# Patient Record
Sex: FEMALE | Race: WHITE | NOT HISPANIC OR LATINO | Employment: OTHER | URBAN - NONMETROPOLITAN AREA
[De-identification: names, ages, dates, MRNs, and addresses within clinical notes are randomized per-mention and may not be internally consistent; named-entity substitution may affect disease eponyms.]

---

## 2017-02-28 ENCOUNTER — OFFICE VISIT (OUTPATIENT)
Dept: SURGERY | Facility: CLINIC | Age: 52
End: 2017-02-28

## 2017-02-28 VITALS
BODY MASS INDEX: 34.17 KG/M2 | HEART RATE: 85 BPM | SYSTOLIC BLOOD PRESSURE: 130 MMHG | DIASTOLIC BLOOD PRESSURE: 78 MMHG | TEMPERATURE: 97.6 F | OXYGEN SATURATION: 96 % | HEIGHT: 61 IN | WEIGHT: 181 LBS

## 2017-02-28 DIAGNOSIS — Z12.11 COLON CANCER SCREENING: Primary | ICD-10-CM

## 2017-02-28 PROCEDURE — S0260 H&P FOR SURGERY: HCPCS | Performed by: SURGERY

## 2017-02-28 RX ORDER — LORATADINE 10 MG/1
10 TABLET ORAL DAILY
COMMUNITY
End: 2022-10-26

## 2017-02-28 RX ORDER — SODIUM CHLORIDE 0.9 % (FLUSH) 0.9 %
1-10 SYRINGE (ML) INJECTION AS NEEDED
Status: CANCELLED | OUTPATIENT
Start: 2017-02-28

## 2017-02-28 RX ORDER — LEVOTHYROXINE SODIUM 0.03 MG/1
25 TABLET ORAL EVERY MORNING
COMMUNITY

## 2017-02-28 RX ORDER — OXYBUTYNIN CHLORIDE 10 MG/1
10 TABLET, EXTENDED RELEASE ORAL DAILY
COMMUNITY

## 2017-02-28 RX ORDER — PHENTERMINE HYDROCHLORIDE 37.5 MG/1
37.5 CAPSULE ORAL EVERY MORNING
COMMUNITY
End: 2017-03-28

## 2017-02-28 RX ORDER — POLYETHYLENE GLYCOL 3350 17 G/17G
238 POWDER, FOR SOLUTION ORAL ONCE
Qty: 238 G | Refills: 0 | Status: SHIPPED | OUTPATIENT
Start: 2017-02-28 | End: 2017-02-28

## 2017-02-28 RX ORDER — BIOTIN 1 MG
1000 TABLET ORAL DAILY
COMMUNITY

## 2017-02-28 RX ORDER — AZELASTINE 1 MG/ML
2 SPRAY, METERED NASAL 2 TIMES DAILY PRN
COMMUNITY
End: 2020-04-27

## 2017-02-28 RX ORDER — ATORVASTATIN CALCIUM 20 MG/1
10 TABLET, FILM COATED ORAL DAILY
COMMUNITY
End: 2020-04-30

## 2017-02-28 RX ORDER — CETIRIZINE HYDROCHLORIDE 10 MG/1
10 TABLET ORAL DAILY
COMMUNITY
End: 2022-10-26

## 2017-02-28 RX ORDER — SODIUM CHLORIDE, SODIUM LACTATE, POTASSIUM CHLORIDE, CALCIUM CHLORIDE 600; 310; 30; 20 MG/100ML; MG/100ML; MG/100ML; MG/100ML
50 INJECTION, SOLUTION INTRAVENOUS CONTINUOUS
Status: CANCELLED | OUTPATIENT
Start: 2017-02-28

## 2017-02-28 NOTE — PROGRESS NOTES
Subjective   La Gambino is a 51 y.o. female.   Chief Complaint   Patient presents with   • Colon Cancer Screening     No prior exam. Patient denies rectal bleeding and pain. Patient has hx of constipation. No family hx of colon cancer.        History of Present Illness     Mrs. Gambino presents for evaluation for colon cancer screening.  There is no family history of colon neoplasia. No family hx of gastric, ovarian, or uterine cancer. Patient denies changes in bowel habits, abdominal pain, decreased caliber of stool, melena, brbpr and weight loss. Patient reports diarrhea and constipation. There is no personal or family history of bleeding disorder or untoward reaction to anesthesia. Patient has never had a colonoscopy.      Past Medical History   Diagnosis Date   • Allergic rhinitis    • Hyperlipidemia    • Hypothyroidism    • OAB (overactive bladder)        Past Surgical History   Procedure Laterality Date   • Hysterectomy     •  section     • Dilatation and curettage     • Mouth surgery     • Mastopexy Bilateral    • Cervical conization, leep     • Endometrial ablation     • Nasal septum surgery     • Ganglion cyst excision     • Soft tissue cyst excision         Current Outpatient Prescriptions:   •  atorvastatin (LIPITOR) 20 MG tablet, Take 20 mg by mouth Daily., Disp: , Rfl:   •  azelastine (ASTELIN) 0.1 % nasal spray, 2 sprays into each nostril 2 (Two) Times a Day. Use in each nostril as directed, Disp: , Rfl:   •  Biotin 5000 MCG tablet, Take  by mouth., Disp: , Rfl:   •  cetirizine (zyrTEC) 10 MG tablet, Take 10 mg by mouth Daily., Disp: , Rfl:   •  levothyroxine (SYNTHROID, LEVOTHROID) 25 MCG tablet, Take 25 mcg by mouth Daily., Disp: , Rfl:   •  loratadine (CLARITIN) 10 MG tablet, Take 10 mg by mouth Daily., Disp: , Rfl:   •  oxybutynin XL (DITROPAN XL) 10 MG 24 hr tablet, Take 10 mg by mouth Daily., Disp: , Rfl:   •  phentermine 37.5 MG capsule, Take 37.5 mg by mouth Every Morning.,  "Disp: , Rfl:   •  bisacodyl (DULCOLAX) 5 MG EC tablet, Take 4 tablets once by mouth at 1 pm on day before colonoscopy., Disp: 4 tablet, Rfl: 0  •  polyethylene glycol (MIRALAX) powder, Take 238 g by mouth 1 (One) Time for 1 dose. Mix 238 grams of miralax with 64 oz garotade and drink at 4 pm day before colonoscopy, Disp: 238 g, Rfl: 0      Allergies no known allergies      Family History   Problem Relation Age of Onset   • Arthritis Mother    • Diabetes Mother    • Kidney cancer Mother    • Lung cancer Father    • Heart attack Father    • Stroke Father    • Osteosarcoma Brother    • Lung cancer Paternal Grandmother    • Heart attack Paternal Grandfather          Social History     Social History   • Marital status:      Spouse name: N/A   • Number of children: N/A   • Years of education: N/A     Occupational History   • Not on file.     Social History Main Topics   • Smoking status: Not on file   • Smokeless tobacco: Not on file   • Alcohol use Not on file   • Drug use: Not on file   • Sexual activity: Not on file     Other Topics Concern   • Not on file     Social History Narrative   • No narrative on file         Review of Systems   Constitutional: Positive for fatigue.   HENT: Negative.    Eyes: Negative.    Respiratory: Negative.    Cardiovascular: Negative.    Gastrointestinal: Negative.    Endocrine: Negative.    Genitourinary: Positive for frequency.   Musculoskeletal: Negative.    Skin: Negative.    Allergic/Immunologic: Negative.    Neurological: Negative.    Hematological: Negative.    Psychiatric/Behavioral: Negative.        Objective    Visit Vitals   • /78   • Pulse 85   • Temp 97.6 °F (36.4 °C)   • Ht 61\" (154.9 cm)   • Wt 181 lb (82.1 kg)   • SpO2 96%   • BMI 34.2 kg/m2       Physical Exam   Constitutional: She is oriented to person, place, and time. She appears well-developed and well-nourished.   HENT:   Head: Normocephalic and atraumatic.   Eyes: No scleral icterus.   Cardiovascular: " Regular rhythm.    Pulmonary/Chest: Effort normal.   Neurological: She is alert and oriented to person, place, and time.   Skin: Skin is warm and dry.   Psychiatric: She has a normal mood and affect. Her behavior is normal.       Assessment/Plan   La was seen today for colon cancer screening.    Diagnoses and all orders for this visit:    Colon cancer screening  -     Case Request; Standing  -     sodium chloride 0.9 % flush 1-10 mL; Infuse 1-10 mL into a venous catheter As Needed for line care.  -     lactated ringers infusion; Infuse 50 mL/hr into a venous catheter Continuous.  -     Case Request    Other orders  -     Follow Anesthesia Guidelines / Standing Orders; Future  -     Provide NPO Instructions to Patient  -     Follow Anesthesia Guidelines / Standing Orders; Standing  -     Verify NPO Status; Standing  -     Verify Informed Consent; Standing  -     Notify Physician (Specify Parameters); Standing  -     Insert Peripheral IV; Standing  -     Saline Lock & Maintain IV Access; Standing  -     polyethylene glycol (MIRALAX) powder; Take 238 g by mouth 1 (One) Time for 1 dose. Mix 238 grams of miralax with 64 oz garotade and drink at 4 pm day before colonoscopy  -     bisacodyl (DULCOLAX) 5 MG EC tablet; Take 4 tablets once by mouth at 1 pm on day before colonoscopy.    We discussed colonoscopy for colon cancer screening purposes.  We discussed the indications for screening colonoscopy as well as the risks, benefits and alternatives to this procedure. Risks including but not limited to perforation, bleeding,need for blood transfusion or emergent surgery ,and missed neoplasm were reviewed in detail with the patient. The necessary bowel preparation and pre-procedure clear liquid diet was explained in detail.  A written instructional handout was also provided.  Electronic prescriptions for miralax and dulcolax were sent to the patient's pharmacy.  The patient was given an opportunity to ask questions.  The  patient verbalized understanding of these recommendations and the plan of care. The patient is willing to proceed with colonoscopy and has signed informed consent in the office today.  My office will arrange scheduling for the colonoscopy procedure and pre-admission testing.        The patient's ANTONIO report was obtained, reviewed by me and scanned into the medical record.

## 2017-03-28 RX ORDER — VITAMIN E 268 MG
400 CAPSULE ORAL DAILY
COMMUNITY
End: 2022-10-26

## 2017-03-28 RX ORDER — CHLORAL HYDRATE 500 MG
1000 CAPSULE ORAL
COMMUNITY
End: 2020-09-28

## 2017-03-28 RX ORDER — CHOLECALCIFEROL (VITAMIN D3) 125 MCG
5000 CAPSULE ORAL DAILY
COMMUNITY
End: 2021-09-29

## 2017-03-28 RX ORDER — DOCUSATE SODIUM 100 MG/1
100 CAPSULE, LIQUID FILLED ORAL DAILY
COMMUNITY
End: 2020-04-30

## 2017-04-03 ENCOUNTER — ANESTHESIA EVENT (OUTPATIENT)
Dept: GASTROENTEROLOGY | Facility: HOSPITAL | Age: 52
End: 2017-04-03

## 2017-04-03 ENCOUNTER — HOSPITAL ENCOUNTER (OUTPATIENT)
Facility: HOSPITAL | Age: 52
Setting detail: HOSPITAL OUTPATIENT SURGERY
Discharge: HOME | End: 2017-04-03
Attending: SURGERY | Admitting: SURGERY

## 2017-04-03 ENCOUNTER — ANESTHESIA (OUTPATIENT)
Dept: GASTROENTEROLOGY | Facility: HOSPITAL | Age: 52
End: 2017-04-03

## 2017-04-03 VITALS
DIASTOLIC BLOOD PRESSURE: 60 MMHG | OXYGEN SATURATION: 95 % | HEART RATE: 63 BPM | WEIGHT: 170 LBS | HEIGHT: 61 IN | TEMPERATURE: 98.2 F | RESPIRATION RATE: 16 BRPM | SYSTOLIC BLOOD PRESSURE: 92 MMHG | BODY MASS INDEX: 32.1 KG/M2

## 2017-04-03 DIAGNOSIS — Z12.11 COLON CANCER SCREENING: ICD-10-CM

## 2017-04-03 PROCEDURE — 45378 DIAGNOSTIC COLONOSCOPY: CPT | Performed by: SURGERY

## 2017-04-03 PROCEDURE — 25010000002 PROPOFOL 10 MG/ML EMULSION: Performed by: NURSE ANESTHETIST, CERTIFIED REGISTERED

## 2017-04-03 PROCEDURE — 25010000002 MIDAZOLAM PER 1 MG: Performed by: NURSE ANESTHETIST, CERTIFIED REGISTERED

## 2017-04-03 PROCEDURE — S0260 H&P FOR SURGERY: HCPCS | Performed by: SURGERY

## 2017-04-03 PROCEDURE — 25010000002 ONDANSETRON PER 1 MG: Performed by: NURSE ANESTHETIST, CERTIFIED REGISTERED

## 2017-04-03 PROCEDURE — 25010000002 MEPERIDINE PER 100 MG: Performed by: NURSE ANESTHETIST, CERTIFIED REGISTERED

## 2017-04-03 RX ORDER — SODIUM CHLORIDE, SODIUM LACTATE, POTASSIUM CHLORIDE, CALCIUM CHLORIDE 600; 310; 30; 20 MG/100ML; MG/100ML; MG/100ML; MG/100ML
50 INJECTION, SOLUTION INTRAVENOUS CONTINUOUS
Status: DISCONTINUED | OUTPATIENT
Start: 2017-04-03 | End: 2017-04-03 | Stop reason: HOSPADM

## 2017-04-03 RX ORDER — MEPERIDINE HYDROCHLORIDE 50 MG/ML
INJECTION INTRAMUSCULAR; INTRAVENOUS; SUBCUTANEOUS AS NEEDED
Status: DISCONTINUED | OUTPATIENT
Start: 2017-04-03 | End: 2017-04-03 | Stop reason: SURG

## 2017-04-03 RX ORDER — PROPOFOL 10 MG/ML
VIAL (ML) INTRAVENOUS AS NEEDED
Status: DISCONTINUED | OUTPATIENT
Start: 2017-04-03 | End: 2017-04-03 | Stop reason: SURG

## 2017-04-03 RX ORDER — ONDANSETRON 2 MG/ML
INJECTION INTRAMUSCULAR; INTRAVENOUS AS NEEDED
Status: DISCONTINUED | OUTPATIENT
Start: 2017-04-03 | End: 2017-04-03 | Stop reason: SURG

## 2017-04-03 RX ORDER — MIDAZOLAM HYDROCHLORIDE 1 MG/ML
INJECTION INTRAMUSCULAR; INTRAVENOUS AS NEEDED
Status: DISCONTINUED | OUTPATIENT
Start: 2017-04-03 | End: 2017-04-03 | Stop reason: SURG

## 2017-04-03 RX ORDER — SODIUM CHLORIDE 0.9 % (FLUSH) 0.9 %
1-10 SYRINGE (ML) INJECTION AS NEEDED
Status: DISCONTINUED | OUTPATIENT
Start: 2017-04-03 | End: 2017-04-03 | Stop reason: HOSPADM

## 2017-04-03 RX ADMIN — SODIUM CHLORIDE, POTASSIUM CHLORIDE, SODIUM LACTATE AND CALCIUM CHLORIDE 50 ML/HR: 600; 310; 30; 20 INJECTION, SOLUTION INTRAVENOUS at 06:53

## 2017-04-03 RX ADMIN — PROPOFOL 50 MG: 10 INJECTION, EMULSION INTRAVENOUS at 07:36

## 2017-04-03 RX ADMIN — MEPERIDINE HYDROCHLORIDE 50 MG: 50 INJECTION INTRAMUSCULAR; INTRAVENOUS; SUBCUTANEOUS at 07:36

## 2017-04-03 RX ADMIN — MIDAZOLAM HYDROCHLORIDE 2 MG: 1 INJECTION, SOLUTION INTRAMUSCULAR; INTRAVENOUS at 07:36

## 2017-04-03 RX ADMIN — PROPOFOL 50 MG: 10 INJECTION, EMULSION INTRAVENOUS at 07:45

## 2017-04-03 RX ADMIN — ONDANSETRON 4 MG: 2 INJECTION INTRAMUSCULAR; INTRAVENOUS at 07:36

## 2017-04-03 NOTE — PLAN OF CARE
Problem: GI Endoscopy (Adult)  Goal: Signs and Symptoms of Listed Potential Problems Will be Absent or Manageable (GI Endoscopy)  Outcome: Ongoing (interventions implemented as appropriate)    04/03/17 0633   GI Endoscopy   Problems Assessed (GI Endoscopy) all   Problems Present (GI Endoscopy       04/03/17 0633   GI Endoscopy   Problems Assessed (GI Endoscopy) all   Problems Present (GI Endoscopy) none   ) none

## 2017-04-03 NOTE — H&P
Patient Care Team:  KEVEN Hansen as PCP - General (Family Medicine)  Prema Phillip MD as Surgeon (General Surgery)    Chief complaint colon cancer screening    Subjective     History of Present Illness  Colon cancer screening.  Patient is asymptomatic.  No family hx.      Review of Systems   All other systems reviewed and are negative.       Past Medical History:   Diagnosis Date   • Allergic rhinitis    • Hyperlipidemia    • Hypothyroidism    • OAB (overactive bladder)    • PONV (postoperative nausea and vomiting)    • Seasonal allergies    • Wears glasses      Past Surgical History:   Procedure Laterality Date   • BLADDER REPAIR      WITH HYSTERECTOMY   • CERVICAL CONIZATION, LEEP     •  SECTION     • DILATATION AND CURETTAGE     • ENDOMETRIAL ABLATION     • GANGLION CYST EXCISION     • HYSTERECTOMY     • MASTOPEXY Bilateral    • MOUTH SURGERY      WISDOM TEETH EXTRACTED   • NASAL SEPTUM SURGERY     • SOFT TISSUE CYST EXCISION      SEBACOUS CYST REMOVED      Family History   Problem Relation Age of Onset   • Arthritis Mother    • Diabetes Mother    • Kidney cancer Mother    • Lung cancer Father    • Heart attack Father    • Stroke Father    • Osteosarcoma Brother    • Lung cancer Paternal Grandmother    • Heart attack Paternal Grandfather      Social History   Substance Use Topics   • Smoking status: Never Smoker   • Smokeless tobacco: Never Used   • Alcohol use No     Prescriptions Prior to Admission   Medication Sig Dispense Refill Last Dose   • atorvastatin (LIPITOR) 20 MG tablet Take 10 mg by mouth Daily.   2017 at 1930   • azelastine (ASTELIN) 0.1 % nasal spray 2 sprays into each nostril 2 (Two) Times a Day As Needed. Use in each nostril as directed    Past Month at Unknown time   • Biotin 5000 MCG tablet Take 5,000 mcg by mouth Daily.   2017   • bisacodyl (DULCOLAX) 5 MG EC tablet Take 4 tablets once by mouth at 1 pm on day before colonoscopy.  (Patient taking differently: Take 20 mg by mouth. Take 4 tablets once by mouth at 1 pm on day before colonoscopy.  ) 4 tablet 0 4/2/2017 at 1300   • cetirizine (zyrTEC) 10 MG tablet Take 10 mg by mouth Daily.   4/2/2017 at 1930   • Cholecalciferol (VITAMIN D3) 5000 UNITS capsule capsule Take 5,000 Units by mouth Daily.   4/1/2017   • docusate sodium (COLACE) 100 MG capsule Take 100 mg by mouth Daily.   4/1/2017   • levothyroxine (SYNTHROID, LEVOTHROID) 25 MCG tablet Take 25 mcg by mouth Daily.   4/1/2017   • loratadine (CLARITIN) 10 MG tablet Take 10 mg by mouth Daily.   4/2/2017 at 0900   • MULTIPLE VITAMIN PO Take 1 tablet by mouth Daily.   4/1/2017   • Omega-3 Fatty Acids (FISH OIL) 1000 MG capsule capsule Take 1,000 mg by mouth Daily With Breakfast.   4/1/2017   • oxybutynin XL (DITROPAN XL) 10 MG 24 hr tablet Take 10 mg by mouth Daily.   4/1/2017   • vitamin E 400 UNIT capsule Take 400 Units by mouth Daily.   4/1/2017     Allergies:  Review of patient's allergies indicates no known allergies.    Objective      Vital Signs  Temp:  [98 °F (36.7 °C)] 98 °F (36.7 °C)  Heart Rate:  [62] 62  Resp:  [18] 18  BP: (110)/(72) 110/72    Physical Exam   Constitutional: She is oriented to person, place, and time. She appears well-developed and well-nourished.   HENT:   Head: Normocephalic and atraumatic.   Cardiovascular: Regular rhythm.    Pulmonary/Chest: Effort normal.   Abdominal: Soft. She exhibits no distension. There is no tenderness.   Neurological: She is alert and oriented to person, place, and time.   Skin: Skin is warm and dry.   Psychiatric: She has a normal mood and affect. Her behavior is normal.       Results Review:   I reviewed the patient's new clinical results.          Active Problems:    Colon cancer screening      Assessment & Plan  We discussed the indications for screening colonoscopy as well as the risks, benefits and alternatives to this procedure. Risks including but not limited to perforation,  bleeding,need for blood transfusion or emergent surgery ,and missed neoplasm were reviewed in detail with the patient. Consent was signed.        Prema Phillip MD  04/03/17  7:34 AM

## 2017-04-03 NOTE — DISCHARGE INSTRUCTIONS
To assist you in voiding:  Drink plenty of fluids  Listen to running water while attempting to void.    If you are unable to urinate and you have an uncomfortable urge to void or it has been   6 hours since you were discharged, return to the Emergency Room    NEXT COLONOSCOPY IN 10 YEARS

## 2017-04-03 NOTE — ANESTHESIA PREPROCEDURE EVALUATION
Anesthesia Evaluation     Patient summary reviewed and Nursing notes reviewed   history of anesthetic complications: PONV  NPO Status: > 8 hours   Airway   Mallampati: I  TM distance: >3 FB  Neck ROM: full  no difficulty expected  Dental - normal exam     Pulmonary - negative pulmonary ROS and normal exam    breath sounds clear to auscultation  Cardiovascular - normal exam    Rhythm: regular  Rate: normal    (+) hyperlipidemia      Neuro/Psych- negative ROS  GI/Hepatic/Renal/Endo    (+)  hypothyroidism,     Musculoskeletal (-) negative ROS    Abdominal    Substance History - negative use     OB/GYN negative ob/gyn ROS         Other - negative ROS                                   Anesthesia Plan    ASA 2     MAC   (Pt told that intravenous sedation will be used as the primary anesthetic along with local anesthesia if necessary. Every effort will be made to make sure the patient is comfortable.     The patient was told they may or may not have recall for the procedure.    Will proceed with the plan of care.)  intravenous induction   Anesthetic plan and risks discussed with patient.

## 2017-04-03 NOTE — BRIEF OP NOTE
COLONOSCOPY  Procedure Note    La Gambino  4/3/2017    Pre-op Diagnosis:   Colon cancer screening [Z12.11]    Post-op Diagnosis:     Diverticulosis  Internal Hemorrhoids     Procedure/CPT® Codes: 86135      Procedure(s):  COLONOSCOPY     Surgeon(s):  Prema Phillip MD    Anesthesia: Monitor Anesthesia Care    Staff:   Circulator: Teresa Bellamy RN  Scrub Person: Austyn Blanton; Trung Flannery    Estimated Blood Loss: * No values recorded between 4/3/2017  7:34 AM and 4/3/2017  8:01 AM *  Urine Voided: * No values recorded between 4/3/2017  7:34 AM and 4/3/2017  8:01 AM *    Specimens:                * No specimens in log *      Drains: none    Findings: as above    Complications: none      Prema Phillip MD     Date: 4/3/2017  Time: 8:03 AM

## 2017-05-11 ENCOUNTER — HOSPITAL ENCOUNTER (OUTPATIENT)
Dept: OTHER | Age: 52
Discharge: HOME | End: 2017-05-11

## 2017-05-11 LAB — ESTRADIOL LEVEL: 91 PG/ML

## 2017-05-14 LAB — TESTOSTERONE FREE: 3.2 PG/ML (ref 1.1–5.8)

## 2017-05-25 ENCOUNTER — HOSPITAL ENCOUNTER (OUTPATIENT)
Dept: OTHER | Age: 52
Discharge: HOME | End: 2017-05-25
Attending: NURSE PRACTITIONER | Admitting: NURSE PRACTITIONER

## 2017-05-25 LAB
A/G RATIO: 2.1 (ref 0.8–2)
ALBUMIN SERPL-MCNC: 4.2 G/DL (ref 3.4–4.8)
ALP BLD-CCNC: 75 U/L (ref 25–100)
ALT SERPL-CCNC: 19 U/L (ref 4–36)
ANION GAP SERPL CALCULATED.3IONS-SCNC: 11 MMOL/L (ref 3–16)
AST SERPL-CCNC: 22 U/L (ref 8–33)
BASOPHILS ABSOLUTE: 0 K/UL (ref 0–0.1)
BASOPHILS RELATIVE PERCENT: 0.4 %
BILIRUB SERPL-MCNC: 0.3 MG/DL (ref 0.3–1.2)
BUN BLDV-MCNC: 11 MG/DL (ref 6–20)
CALCIUM SERPL-MCNC: 8.8 MG/DL (ref 8.5–10.5)
CHLORIDE BLD-SCNC: 106 MMOL/L (ref 98–107)
CHOLESTEROL, TOTAL: 167 MG/DL (ref 0–200)
CO2: 25 MMOL/L (ref 20–30)
CREAT SERPL-MCNC: 0.6 MG/DL (ref 0.4–1.2)
EOSINOPHILS ABSOLUTE: 0.2 K/UL (ref 0–0.4)
EOSINOPHILS RELATIVE PERCENT: 2.4 %
FOLATE: 7.28 NG/ML
GFR AFRICAN AMERICAN: >59
GFR NON-AFRICAN AMERICAN: >60
GLOBULIN: 2 G/DL
GLUCOSE BLD-MCNC: 85 MG/DL (ref 74–106)
HCT VFR BLD CALC: 40 % (ref 37–47)
HDLC SERPL-MCNC: 59 MG/DL (ref 40–60)
HEMOGLOBIN: 13.6 G/DL (ref 11.5–16.5)
LDL CHOLESTEROL CALCULATED: 74 MG/DL
LYMPHOCYTES ABSOLUTE: 1.6 K/UL (ref 1.5–4)
LYMPHOCYTES RELATIVE PERCENT: 22.6 % (ref 20–40)
MCH RBC QN AUTO: 31.6 PG (ref 27–32)
MCHC RBC AUTO-ENTMCNC: 34 G/DL (ref 31–35)
MCV RBC AUTO: 92.8 FL (ref 80–100)
MONOCYTES ABSOLUTE: 0.4 K/UL (ref 0.2–0.8)
MONOCYTES RELATIVE PERCENT: 5.8 % (ref 3–10)
NEUTROPHILS ABSOLUTE: 5 K/UL (ref 2–7.5)
NEUTROPHILS RELATIVE PERCENT: 68.8 %
PDW BLD-RTO: 12.5 % (ref 11–16)
PLATELET # BLD: 244 K/UL (ref 150–400)
PMV BLD AUTO: 10.9 FL (ref 6–10)
POTASSIUM SERPL-SCNC: 4.4 MMOL/L (ref 3.4–5.1)
RBC # BLD: 4.31 M/UL (ref 3.8–5.8)
SODIUM BLD-SCNC: 142 MMOL/L (ref 136–145)
TOTAL PROTEIN: 6.2 G/DL (ref 6.4–8.3)
TRIGL SERPL-MCNC: 170 MG/DL (ref 0–249)
TSH SERPL DL<=0.05 MIU/L-ACNC: 1.39 UIU/ML (ref 0.35–5.5)
VITAMIN B-12: 688 PG/ML (ref 211–911)
VLDLC SERPL CALC-MCNC: 34 MG/DL
WBC # BLD: 7.2 K/UL (ref 4–11)

## 2017-05-31 ENCOUNTER — HOSPITAL ENCOUNTER (OUTPATIENT)
Dept: PHYSICAL THERAPY | Age: 52
Discharge: HOME | End: 2017-05-31
Attending: NURSE PRACTITIONER | Admitting: NURSE PRACTITIONER

## 2017-05-31 ASSESSMENT — PAIN SCALES - GENERAL: PAINLEVEL_OUTOF10: 4

## 2017-06-06 ENCOUNTER — HOSPITAL ENCOUNTER (OUTPATIENT)
Dept: PHYSICAL THERAPY | Age: 52
Discharge: HOME | End: 2017-06-06
Admitting: NURSE PRACTITIONER

## 2017-06-09 ENCOUNTER — HOSPITAL ENCOUNTER (OUTPATIENT)
Dept: PHYSICAL THERAPY | Age: 52
Discharge: HOME | End: 2017-06-09
Admitting: NURSE PRACTITIONER

## 2017-06-15 ENCOUNTER — HOSPITAL ENCOUNTER (OUTPATIENT)
Dept: PHYSICAL THERAPY | Age: 52
Discharge: HOME | End: 2017-06-15
Admitting: NURSE PRACTITIONER

## 2017-06-19 ENCOUNTER — HOSPITAL ENCOUNTER (OUTPATIENT)
Dept: PHYSICAL THERAPY | Age: 52
Discharge: HOME | End: 2017-06-19
Admitting: NURSE PRACTITIONER

## 2017-06-21 ENCOUNTER — HOSPITAL ENCOUNTER (OUTPATIENT)
Dept: PHYSICAL THERAPY | Age: 52
Discharge: HOME | End: 2017-06-21
Admitting: NURSE PRACTITIONER

## 2017-06-27 ENCOUNTER — HOSPITAL ENCOUNTER (OUTPATIENT)
Dept: PHYSICAL THERAPY | Age: 52
Discharge: HOME | End: 2017-06-27
Admitting: NURSE PRACTITIONER

## 2017-06-30 ENCOUNTER — HOSPITAL ENCOUNTER (OUTPATIENT)
Dept: PHYSICAL THERAPY | Age: 52
Discharge: HOME | End: 2017-06-30
Admitting: NURSE PRACTITIONER

## 2017-07-03 ENCOUNTER — HOSPITAL ENCOUNTER (OUTPATIENT)
Dept: PHYSICAL THERAPY | Age: 52
Discharge: HOME | End: 2017-07-03
Admitting: NURSE PRACTITIONER

## 2017-07-10 ENCOUNTER — HOSPITAL ENCOUNTER (OUTPATIENT)
Dept: PHYSICAL THERAPY | Age: 52
Discharge: HOME | End: 2017-07-10
Admitting: NURSE PRACTITIONER

## 2017-07-18 ENCOUNTER — HOSPITAL ENCOUNTER (OUTPATIENT)
Dept: PHYSICAL THERAPY | Age: 52
Discharge: HOME | End: 2017-07-18
Admitting: NURSE PRACTITIONER

## 2017-08-01 ENCOUNTER — HOSPITAL ENCOUNTER (OUTPATIENT)
Dept: OTHER | Age: 52
Discharge: HOME | End: 2017-08-01
Attending: OBSTETRICS & GYNECOLOGY | Admitting: OBSTETRICS & GYNECOLOGY

## 2017-08-01 LAB — ESTRADIOL LEVEL: 89 PG/ML

## 2017-08-03 LAB
SEX HORMONE BINDING GLOBULIN: 87 NMOL/L (ref 30–135)
TESTOSTERONE FREE-NONMALE: 2 PG/ML (ref 1.1–5.8)
TESTOSTERONE TOTAL: 22 NG/DL (ref 20–70)

## 2017-08-14 ENCOUNTER — HOSPITAL ENCOUNTER (OUTPATIENT)
Dept: PHYSICAL THERAPY | Age: 52
Discharge: HOME | End: 2017-08-14
Admitting: NURSE PRACTITIONER

## 2017-08-17 ENCOUNTER — HOSPITAL ENCOUNTER (OUTPATIENT)
Dept: OTHER | Age: 52
Discharge: HOME | End: 2017-08-17
Attending: NURSE PRACTITIONER | Admitting: NURSE PRACTITIONER

## 2017-08-17 DIAGNOSIS — M25.562 LEFT KNEE PAIN, UNSPECIFIED CHRONICITY: ICD-10-CM

## 2017-11-20 ENCOUNTER — HOSPITAL ENCOUNTER (OUTPATIENT)
Dept: OTHER | Age: 52
Discharge: HOME | End: 2017-11-20

## 2017-11-20 LAB
A/G RATIO: 1.6 (ref 0.8–2)
ALBUMIN SERPL-MCNC: 4.5 G/DL (ref 3.4–4.8)
ALP BLD-CCNC: 89 U/L (ref 25–100)
ALT SERPL-CCNC: 27 U/L (ref 4–36)
ANION GAP SERPL CALCULATED.3IONS-SCNC: 9 MMOL/L (ref 3–16)
AST SERPL-CCNC: 24 U/L (ref 8–33)
BASOPHILS ABSOLUTE: 0 K/UL (ref 0–0.1)
BASOPHILS RELATIVE PERCENT: 0.5 %
BILIRUB SERPL-MCNC: <0.2 MG/DL (ref 0.3–1.2)
BUN BLDV-MCNC: 12 MG/DL (ref 6–20)
CALCIUM SERPL-MCNC: 9.4 MG/DL (ref 8.5–10.5)
CHLORIDE BLD-SCNC: 102 MMOL/L (ref 98–107)
CHOLESTEROL, TOTAL: 187 MG/DL (ref 0–200)
CO2: 29 MMOL/L (ref 20–30)
CREAT SERPL-MCNC: 0.7 MG/DL (ref 0.4–1.2)
EOSINOPHILS ABSOLUTE: 0.2 K/UL (ref 0–0.4)
EOSINOPHILS RELATIVE PERCENT: 3.1 %
ESTRADIOL LEVEL: 98 PG/ML
FOLATE: 4.33 NG/ML
GFR AFRICAN AMERICAN: >59
GFR NON-AFRICAN AMERICAN: >60
GLOBULIN: 2.8 G/DL
GLUCOSE BLD-MCNC: 82 MG/DL (ref 74–106)
HCT VFR BLD CALC: 43.4 % (ref 37–47)
HDLC SERPL-MCNC: 70 MG/DL (ref 40–60)
HEMOGLOBIN: 14.8 G/DL (ref 11.5–16.5)
LDL CHOLESTEROL CALCULATED: 90 MG/DL
LYMPHOCYTES ABSOLUTE: 1.6 K/UL (ref 1.5–4)
LYMPHOCYTES RELATIVE PERCENT: 26.4 % (ref 20–40)
MCH RBC QN AUTO: 30.5 PG (ref 27–32)
MCHC RBC AUTO-ENTMCNC: 34.1 G/DL (ref 31–35)
MCV RBC AUTO: 89.5 FL (ref 80–100)
MONOCYTES ABSOLUTE: 0.5 K/UL (ref 0.2–0.8)
MONOCYTES RELATIVE PERCENT: 7.7 % (ref 3–10)
NEUTROPHILS ABSOLUTE: 3.8 K/UL (ref 2–7.5)
NEUTROPHILS RELATIVE PERCENT: 62.3 %
PDW BLD-RTO: 15.1 % (ref 11–16)
PLATELET # BLD: 276 K/UL (ref 150–400)
PMV BLD AUTO: 10.4 FL (ref 6–10)
POTASSIUM SERPL-SCNC: 4.3 MMOL/L (ref 3.4–5.1)
RBC # BLD: 4.85 M/UL (ref 3.8–5.8)
SODIUM BLD-SCNC: 140 MMOL/L (ref 136–145)
T3 FREE: 2.9 PG/ML (ref 2.3–4.2)
T4 FREE: 1.19 NG/DL (ref 0.89–1.76)
TOTAL PROTEIN: 7.3 G/DL (ref 6.4–8.3)
TRIGL SERPL-MCNC: 137 MG/DL (ref 0–249)
TSH SERPL DL<=0.05 MIU/L-ACNC: 2.53 UIU/ML (ref 0.35–5.5)
VITAMIN B-12: 892 PG/ML (ref 211–911)
VITAMIN D 25-HYDROXY: 51.1 (ref 32–100)
VLDLC SERPL CALC-MCNC: 27 MG/DL
WBC # BLD: 6.1 K/UL (ref 4–11)

## 2017-11-22 LAB
INSULIN COMMENT: NORMAL
INSULIN REFERENCE RANGE:: NORMAL
INSULIN: 9.6 MU/L
SEX HORMONE BINDING GLOBULIN: 83 NMOL/L (ref 30–135)
TESTOSTERONE FREE-NONMALE: ABNORMAL PG/ML (ref 1.1–5.8)
TESTOSTERONE TOTAL: <3 NG/DL (ref 20–70)

## 2018-02-05 ENCOUNTER — HOSPITAL ENCOUNTER (OUTPATIENT)
Dept: GENERAL RADIOLOGY | Age: 53
Discharge: HOME | End: 2018-02-05

## 2018-02-05 DIAGNOSIS — Z12.39 BREAST CANCER SCREENING: ICD-10-CM

## 2018-02-05 LAB — ESTRADIOL LEVEL: 108 PG/ML

## 2018-02-07 LAB — TESTOSTERONE TOTAL: 12 NG/DL (ref 20–70)

## 2018-03-23 ENCOUNTER — HOSPITAL ENCOUNTER (OUTPATIENT)
Dept: OTHER | Age: 53
Discharge: HOME | End: 2018-03-23

## 2018-03-23 DIAGNOSIS — M79.671 RIGHT FOOT PAIN: ICD-10-CM

## 2018-05-08 ENCOUNTER — HOSPITAL ENCOUNTER (OUTPATIENT)
Dept: OTHER | Age: 53
Discharge: HOME | End: 2018-05-08

## 2018-05-08 DIAGNOSIS — M25.511 RIGHT SHOULDER PAIN, UNSPECIFIED CHRONICITY: ICD-10-CM

## 2018-05-09 ENCOUNTER — HOSPITAL ENCOUNTER (OUTPATIENT)
Dept: OTHER | Age: 53
Discharge: HOME | End: 2018-05-09

## 2018-05-09 LAB
A/G RATIO: 2 (ref 0.8–2)
ALBUMIN SERPL-MCNC: 4.4 G/DL (ref 3.4–4.8)
ALP BLD-CCNC: 74 U/L (ref 25–100)
ALT SERPL-CCNC: 23 U/L (ref 4–36)
ANION GAP SERPL CALCULATED.3IONS-SCNC: 10 MMOL/L (ref 3–16)
AST SERPL-CCNC: 21 U/L (ref 8–33)
BASOPHILS ABSOLUTE: 0 K/UL (ref 0–0.1)
BASOPHILS RELATIVE PERCENT: 0.4 %
BILIRUB SERPL-MCNC: 0.4 MG/DL (ref 0.3–1.2)
BUN BLDV-MCNC: 16 MG/DL (ref 6–20)
CALCIUM SERPL-MCNC: 9.4 MG/DL (ref 8.5–10.5)
CHLORIDE BLD-SCNC: 102 MMOL/L (ref 98–107)
CHOLESTEROL, TOTAL: 186 MG/DL (ref 0–200)
CO2: 28 MMOL/L (ref 20–30)
CREAT SERPL-MCNC: 0.7 MG/DL (ref 0.4–1.2)
EOSINOPHILS ABSOLUTE: 0.1 K/UL (ref 0–0.4)
EOSINOPHILS RELATIVE PERCENT: 1 %
FOLATE: >20 NG/ML
GFR AFRICAN AMERICAN: >59
GFR NON-AFRICAN AMERICAN: >60
GLOBULIN: 2.2 G/DL
GLUCOSE BLD-MCNC: 82 MG/DL (ref 74–106)
HCT VFR BLD CALC: 42.9 % (ref 37–47)
HDLC SERPL-MCNC: 68 MG/DL (ref 40–60)
HEMOGLOBIN: 14.4 G/DL (ref 11.5–16.5)
IMMATURE GRANULOCYTES #: 0 K/UL
IMMATURE GRANULOCYTES %: 0.4 % (ref 0–5)
LDL CHOLESTEROL CALCULATED: 96 MG/DL
LYMPHOCYTES ABSOLUTE: 2.3 K/UL (ref 1.5–4)
LYMPHOCYTES RELATIVE PERCENT: 22.4 %
MCH RBC QN AUTO: 32 PG (ref 27–32)
MCHC RBC AUTO-ENTMCNC: 33.6 G/DL (ref 31–35)
MCV RBC AUTO: 95.3 FL (ref 80–100)
MONOCYTES ABSOLUTE: 0.8 K/UL (ref 0.2–0.8)
MONOCYTES RELATIVE PERCENT: 7.6 %
NEUTROPHILS ABSOLUTE: 6.9 K/UL (ref 2–7.5)
NEUTROPHILS RELATIVE PERCENT: 68.2 %
PDW BLD-RTO: 12.4 % (ref 11–16)
PLATELET # BLD: 276 K/UL (ref 150–400)
PMV BLD AUTO: 10.5 FL (ref 6–10)
POTASSIUM SERPL-SCNC: 4.4 MMOL/L (ref 3.4–5.1)
RBC # BLD: 4.5 M/UL (ref 3.8–5.8)
SODIUM BLD-SCNC: 140 MMOL/L (ref 136–145)
TOTAL PROTEIN: 6.6 G/DL (ref 6.4–8.3)
TRIGL SERPL-MCNC: 111 MG/DL (ref 0–249)
TSH SERPL DL<=0.05 MIU/L-ACNC: 2.43 UIU/ML (ref 0.35–5.5)
VITAMIN B-12: 621 PG/ML (ref 211–911)
VLDLC SERPL CALC-MCNC: 22 MG/DL
WBC # BLD: 10.1 K/UL (ref 4–11)

## 2018-05-30 ENCOUNTER — HOSPITAL ENCOUNTER (OUTPATIENT)
Dept: OTHER | Age: 53
Discharge: HOME | End: 2018-05-30
Attending: OBSTETRICS & GYNECOLOGY | Admitting: OBSTETRICS & GYNECOLOGY

## 2018-05-30 LAB — ESTRADIOL LEVEL: 184 PG/ML

## 2018-06-01 LAB
SEX HORMONE BINDING GLOBULIN: 69 NMOL/L (ref 30–135)
TESTOSTERONE FREE-NONMALE: 2.8 PG/ML (ref 0.6–3.8)
TESTOSTERONE TOTAL: 26 NG/DL (ref 20–70)

## 2018-08-15 ENCOUNTER — HOSPITAL ENCOUNTER (OUTPATIENT)
Dept: GENERAL RADIOLOGY | Facility: HOSPITAL | Age: 53
Discharge: HOME | End: 2018-08-15
Payer: OTHER GOVERNMENT

## 2018-08-15 ENCOUNTER — HOSPITAL ENCOUNTER (OUTPATIENT)
Facility: HOSPITAL | Age: 53
Discharge: HOME | End: 2018-08-15
Payer: OTHER GOVERNMENT

## 2018-08-15 DIAGNOSIS — Q62.11: ICD-10-CM

## 2018-08-15 LAB
ANION GAP SERPL CALCULATED.3IONS-SCNC: 9 MMOL/L (ref 3–16)
BUN BLDV-MCNC: 12 MG/DL (ref 6–20)
CALCIUM SERPL-MCNC: 9.3 MG/DL (ref 8.5–10.5)
CHLORIDE BLD-SCNC: 105 MMOL/L (ref 98–107)
CO2: 26 MMOL/L (ref 20–30)
CREAT SERPL-MCNC: 0.7 MG/DL (ref 0.4–1.2)
GFR AFRICAN AMERICAN: >59
GFR NON-AFRICAN AMERICAN: >60
GLUCOSE BLD-MCNC: 88 MG/DL (ref 74–106)
HCT VFR BLD CALC: 39.2 % (ref 37–47)
HEMOGLOBIN: 12.9 G/DL (ref 11.5–16.5)
MCH RBC QN AUTO: 32 PG (ref 27–32)
MCHC RBC AUTO-ENTMCNC: 32.9 G/DL (ref 31–35)
MCV RBC AUTO: 97.3 FL (ref 80–100)
PDW BLD-RTO: 12.4 % (ref 11–16)
PLATELET # BLD: 251 K/UL (ref 150–400)
PMV BLD AUTO: 10.7 FL (ref 6–10)
POTASSIUM SERPL-SCNC: 4.3 MMOL/L (ref 3.4–5.1)
RBC # BLD: 4.03 M/UL (ref 3.8–5.8)
SODIUM BLD-SCNC: 140 MMOL/L (ref 136–145)
WBC # BLD: 6.1 K/UL (ref 4–11)

## 2018-08-15 PROCEDURE — 74018 RADEX ABDOMEN 1 VIEW: CPT

## 2018-08-15 PROCEDURE — 36415 COLL VENOUS BLD VENIPUNCTURE: CPT

## 2018-08-15 PROCEDURE — 93005 ELECTROCARDIOGRAM TRACING: CPT

## 2018-08-15 PROCEDURE — 85027 COMPLETE CBC AUTOMATED: CPT

## 2018-08-15 PROCEDURE — 80048 BASIC METABOLIC PNL TOTAL CA: CPT

## 2018-08-23 ENCOUNTER — HOSPITAL ENCOUNTER (OUTPATIENT)
Facility: HOSPITAL | Age: 53
Discharge: HOME | End: 2018-08-23
Payer: OTHER GOVERNMENT

## 2018-08-23 LAB
CHOLESTEROL, TOTAL: 180 MG/DL (ref 0–200)
FOLATE: >20 NG/ML
HDLC SERPL-MCNC: 51 MG/DL (ref 40–60)
LDL CHOLESTEROL CALCULATED: 98 MG/DL
TRIGL SERPL-MCNC: 156 MG/DL (ref 0–249)
TSH SERPL DL<=0.05 MIU/L-ACNC: 2.55 UIU/ML (ref 0.35–5.5)
VITAMIN B-12: 642 PG/ML (ref 211–911)
VLDLC SERPL CALC-MCNC: 31 MG/DL

## 2018-08-23 PROCEDURE — 80061 LIPID PANEL: CPT

## 2018-08-23 PROCEDURE — 84443 ASSAY THYROID STIM HORMONE: CPT

## 2018-08-23 PROCEDURE — 82607 VITAMIN B-12: CPT

## 2018-08-23 PROCEDURE — 36415 COLL VENOUS BLD VENIPUNCTURE: CPT

## 2018-08-23 PROCEDURE — 82746 ASSAY OF FOLIC ACID SERUM: CPT

## 2018-09-24 ENCOUNTER — HOSPITAL ENCOUNTER (OUTPATIENT)
Facility: HOSPITAL | Age: 53
Discharge: HOME | End: 2018-09-24
Payer: OTHER GOVERNMENT

## 2018-09-24 LAB — ESTRADIOL LEVEL: 268 PG/ML

## 2018-09-24 PROCEDURE — 84270 ASSAY OF SEX HORMONE GLOBUL: CPT

## 2018-09-24 PROCEDURE — 36415 COLL VENOUS BLD VENIPUNCTURE: CPT

## 2018-09-24 PROCEDURE — 82670 ASSAY OF TOTAL ESTRADIOL: CPT

## 2018-09-24 PROCEDURE — 84403 ASSAY OF TOTAL TESTOSTERONE: CPT

## 2018-09-26 LAB
SEX HORMONE BINDING GLOBULIN: 96 NMOL/L (ref 30–135)
TESTOSTERONE FREE-NONMALE: 3.4 PG/ML (ref 0.6–3.8)
TESTOSTERONE TOTAL: 40 NG/DL (ref 20–70)

## 2018-11-07 ENCOUNTER — HOSPITAL ENCOUNTER (OUTPATIENT)
Dept: PHYSICAL THERAPY | Facility: HOSPITAL | Age: 53
Setting detail: THERAPIES SERIES
Discharge: HOME | End: 2018-11-07
Payer: OTHER GOVERNMENT

## 2018-11-07 PROCEDURE — 97161 PT EVAL LOW COMPLEX 20 MIN: CPT

## 2018-11-09 PROCEDURE — G8984 CARRY CURRENT STATUS: HCPCS

## 2018-11-09 PROCEDURE — G8985 CARRY GOAL STATUS: HCPCS

## 2018-11-09 ASSESSMENT — PAIN DESCRIPTION - LOCATION: LOCATION: ARM;SHOULDER

## 2018-11-09 ASSESSMENT — PAIN DESCRIPTION - PAIN TYPE: TYPE: CHRONIC PAIN

## 2018-11-09 ASSESSMENT — PAIN DESCRIPTION - PROGRESSION: CLINICAL_PROGRESSION: GRADUALLY IMPROVING

## 2018-11-09 ASSESSMENT — PAIN SCALES - GENERAL: PAINLEVEL_OUTOF10: 1

## 2018-11-09 ASSESSMENT — PAIN DESCRIPTION - FREQUENCY: FREQUENCY: INTERMITTENT

## 2018-11-09 ASSESSMENT — PAIN DESCRIPTION - ONSET: ONSET: ON-GOING

## 2018-11-09 ASSESSMENT — PAIN DESCRIPTION - ORIENTATION: ORIENTATION: RIGHT

## 2018-11-09 ASSESSMENT — PAIN DESCRIPTION - DESCRIPTORS: DESCRIPTORS: ACHING;SORE

## 2018-11-12 ENCOUNTER — HOSPITAL ENCOUNTER (OUTPATIENT)
Dept: PHYSICAL THERAPY | Facility: HOSPITAL | Age: 53
Setting detail: THERAPIES SERIES
Discharge: HOME | End: 2018-11-12
Payer: OTHER GOVERNMENT

## 2018-11-12 PROCEDURE — 97140 MANUAL THERAPY 1/> REGIONS: CPT

## 2018-11-12 PROCEDURE — G0283 ELEC STIM OTHER THAN WOUND: HCPCS

## 2018-11-12 PROCEDURE — 97110 THERAPEUTIC EXERCISES: CPT

## 2018-11-12 NOTE — FLOWSHEET NOTE
Physical Therapy Daily Treatment Note   Date:  2018    TIme In:      0940                Time Out:         1050    Patient Name:  Margaux Garcia    :  1965  MRN: [id number]    Restrictions/Precautions:    Pertinent Medical History:  Medical/Treatment Diagnosis Information:  ·   Right shoulder pain; s/s consistent with RTC tendinitis, impingment; joint stiffness, muscle weakness  ·    Insurance/Certification information:      Physician Information:    Ree Patricio PA-C  Plan of care signed (Y/N):    Visit# / total visits:     2/    G-Code (if applicable):      Date / Visit # G-Code Applied:         Progress Note: []  Yes  [x]  No  Next due by: Visit #10      Pain level:   1/10  Subjective: Pt reported increased pain with use of R arm/shoulder.      Objective:  Observation:   Test measurements:    Palpation:    Exercises:  Exercise Resistance/Repetitions Other comments   Scap squeezes 2x15 12   R bent over row 2x15 3# 12   Gentle wall push ups 2x15 12   Gentle doorway stretch 15\"x5 12   Pulleys 2' FF/scaption 12   TB R shoulder ext 2x15 blueberry 12   TB R shoulder ER/IR 2x15 chartreuse 12                              Other Therapeutic Activities:      Manual Treatments:   Right shoulder - grade 1-4 mobs, PROM all planes    Modalities:   moist heat with IF e-stim - right UT-deltoid , proximal biceps-posterior deltoid x 15'      Timed Code Treatment Minutes:  60      Total Treatment Minutes:  70    Treatment/Activity Tolerance:  [x] Patient tolerated treatment well [] Patient limited by fatigue  [] Patient limited by pain  [] Patient limited by other medical complications  [] Other:     Pain after treatment:      0/10    Prognosis: [x] Good [] Fair  [] Poor    Patient Requires Follow-up: [x] Yes  [] No    Plan:   [x] Continue per plan of care [] Alter current plan (see comments)  [] Plan of care initiated [] Hold pending MD visit [] Discharge    Plan for Next Session:        Electronically

## 2018-11-16 ENCOUNTER — HOSPITAL ENCOUNTER (OUTPATIENT)
Dept: PHYSICAL THERAPY | Facility: HOSPITAL | Age: 53
Setting detail: THERAPIES SERIES
Discharge: HOME | End: 2018-11-16
Payer: OTHER GOVERNMENT

## 2018-11-16 PROCEDURE — 97140 MANUAL THERAPY 1/> REGIONS: CPT

## 2018-11-16 PROCEDURE — G0283 ELEC STIM OTHER THAN WOUND: HCPCS

## 2018-11-16 PROCEDURE — 97110 THERAPEUTIC EXERCISES: CPT

## 2018-11-19 ENCOUNTER — HOSPITAL ENCOUNTER (OUTPATIENT)
Dept: PHYSICAL THERAPY | Facility: HOSPITAL | Age: 53
Setting detail: THERAPIES SERIES
Discharge: HOME | End: 2018-11-19
Payer: OTHER GOVERNMENT

## 2018-11-19 PROCEDURE — G0283 ELEC STIM OTHER THAN WOUND: HCPCS

## 2018-11-19 PROCEDURE — 97140 MANUAL THERAPY 1/> REGIONS: CPT

## 2018-11-19 PROCEDURE — 97110 THERAPEUTIC EXERCISES: CPT

## 2018-11-20 ENCOUNTER — HOSPITAL ENCOUNTER (OUTPATIENT)
Facility: HOSPITAL | Age: 53
Discharge: HOME | End: 2018-11-20
Payer: OTHER GOVERNMENT

## 2018-11-20 LAB — ESTRADIOL LEVEL: 262 PG/ML

## 2018-11-20 PROCEDURE — 36415 COLL VENOUS BLD VENIPUNCTURE: CPT

## 2018-11-20 PROCEDURE — 82670 ASSAY OF TOTAL ESTRADIOL: CPT

## 2018-11-20 PROCEDURE — 84403 ASSAY OF TOTAL TESTOSTERONE: CPT

## 2018-11-20 PROCEDURE — 84270 ASSAY OF SEX HORMONE GLOBUL: CPT

## 2018-11-21 ENCOUNTER — HOSPITAL ENCOUNTER (OUTPATIENT)
Dept: PHYSICAL THERAPY | Facility: HOSPITAL | Age: 53
Setting detail: THERAPIES SERIES
Discharge: HOME | End: 2018-11-21
Payer: OTHER GOVERNMENT

## 2018-11-21 PROCEDURE — 97110 THERAPEUTIC EXERCISES: CPT

## 2018-11-21 PROCEDURE — G0283 ELEC STIM OTHER THAN WOUND: HCPCS

## 2018-11-21 PROCEDURE — 97140 MANUAL THERAPY 1/> REGIONS: CPT

## 2018-11-24 LAB
SEX HORMONE BINDING GLOBULIN: 108 NMOL/L (ref 30–135)
TESTOSTERONE FREE-NONMALE: <1 PG/ML (ref 0.6–3.8)
TESTOSTERONE TOTAL: 3 NG/DL (ref 20–70)

## 2018-11-26 ENCOUNTER — HOSPITAL ENCOUNTER (OUTPATIENT)
Dept: PHYSICAL THERAPY | Facility: HOSPITAL | Age: 53
Setting detail: THERAPIES SERIES
Discharge: HOME | End: 2018-11-26
Payer: OTHER GOVERNMENT

## 2018-11-26 PROCEDURE — 97110 THERAPEUTIC EXERCISES: CPT

## 2018-11-26 PROCEDURE — 97140 MANUAL THERAPY 1/> REGIONS: CPT

## 2018-11-26 PROCEDURE — G0283 ELEC STIM OTHER THAN WOUND: HCPCS

## 2018-11-28 ENCOUNTER — APPOINTMENT (OUTPATIENT)
Dept: PHYSICAL THERAPY | Facility: HOSPITAL | Age: 53
End: 2018-11-28
Payer: OTHER GOVERNMENT

## 2018-12-03 ENCOUNTER — HOSPITAL ENCOUNTER (OUTPATIENT)
Dept: PHYSICAL THERAPY | Facility: HOSPITAL | Age: 53
Setting detail: THERAPIES SERIES
End: 2018-12-03
Payer: OTHER GOVERNMENT

## 2018-12-05 ENCOUNTER — HOSPITAL ENCOUNTER (OUTPATIENT)
Dept: PHYSICAL THERAPY | Facility: HOSPITAL | Age: 53
Setting detail: THERAPIES SERIES
End: 2018-12-05
Payer: OTHER GOVERNMENT

## 2018-12-07 ENCOUNTER — HOSPITAL ENCOUNTER (OUTPATIENT)
Dept: PHYSICAL THERAPY | Facility: HOSPITAL | Age: 53
Setting detail: THERAPIES SERIES
End: 2018-12-07
Payer: OTHER GOVERNMENT

## 2019-01-03 ENCOUNTER — HOSPITAL ENCOUNTER (OUTPATIENT)
Dept: PHYSICAL THERAPY | Facility: HOSPITAL | Age: 54
Setting detail: THERAPIES SERIES
End: 2019-01-03
Payer: OTHER GOVERNMENT

## 2019-01-03 ENCOUNTER — HOSPITAL ENCOUNTER (OUTPATIENT)
Dept: PHYSICAL THERAPY | Facility: HOSPITAL | Age: 54
Setting detail: THERAPIES SERIES
Discharge: HOME | End: 2019-01-03
Payer: OTHER GOVERNMENT

## 2019-01-03 PROCEDURE — G8984 CARRY CURRENT STATUS: HCPCS

## 2019-01-03 PROCEDURE — 97110 THERAPEUTIC EXERCISES: CPT

## 2019-01-03 PROCEDURE — 97140 MANUAL THERAPY 1/> REGIONS: CPT

## 2019-01-03 PROCEDURE — G8985 CARRY GOAL STATUS: HCPCS

## 2019-01-03 PROCEDURE — G0283 ELEC STIM OTHER THAN WOUND: HCPCS

## 2019-01-07 ENCOUNTER — HOSPITAL ENCOUNTER (OUTPATIENT)
Dept: PHYSICAL THERAPY | Facility: HOSPITAL | Age: 54
Setting detail: THERAPIES SERIES
Discharge: HOME | End: 2019-01-07
Payer: OTHER GOVERNMENT

## 2019-01-07 PROCEDURE — 97140 MANUAL THERAPY 1/> REGIONS: CPT

## 2019-01-07 PROCEDURE — 97110 THERAPEUTIC EXERCISES: CPT

## 2019-01-07 PROCEDURE — G0283 ELEC STIM OTHER THAN WOUND: HCPCS

## 2019-01-09 ENCOUNTER — HOSPITAL ENCOUNTER (OUTPATIENT)
Dept: PHYSICAL THERAPY | Facility: HOSPITAL | Age: 54
Setting detail: THERAPIES SERIES
Discharge: HOME | End: 2019-01-09
Payer: OTHER GOVERNMENT

## 2019-01-09 PROCEDURE — G0283 ELEC STIM OTHER THAN WOUND: HCPCS

## 2019-01-09 PROCEDURE — 97140 MANUAL THERAPY 1/> REGIONS: CPT

## 2019-01-09 PROCEDURE — 97110 THERAPEUTIC EXERCISES: CPT

## 2019-01-16 ENCOUNTER — HOSPITAL ENCOUNTER (OUTPATIENT)
Dept: PHYSICAL THERAPY | Facility: HOSPITAL | Age: 54
Setting detail: THERAPIES SERIES
Discharge: HOME | End: 2019-01-16
Payer: OTHER GOVERNMENT

## 2019-01-16 PROCEDURE — 97110 THERAPEUTIC EXERCISES: CPT

## 2019-01-16 PROCEDURE — G0283 ELEC STIM OTHER THAN WOUND: HCPCS

## 2019-01-16 PROCEDURE — 97140 MANUAL THERAPY 1/> REGIONS: CPT

## 2019-01-18 ENCOUNTER — HOSPITAL ENCOUNTER (OUTPATIENT)
Dept: PHYSICAL THERAPY | Facility: HOSPITAL | Age: 54
Setting detail: THERAPIES SERIES
Discharge: HOME | End: 2019-01-18
Payer: OTHER GOVERNMENT

## 2019-01-18 PROCEDURE — G0283 ELEC STIM OTHER THAN WOUND: HCPCS

## 2019-01-18 PROCEDURE — 97140 MANUAL THERAPY 1/> REGIONS: CPT

## 2019-01-18 PROCEDURE — 97110 THERAPEUTIC EXERCISES: CPT

## 2019-01-28 ENCOUNTER — HOSPITAL ENCOUNTER (OUTPATIENT)
Dept: PHYSICAL THERAPY | Facility: HOSPITAL | Age: 54
Setting detail: THERAPIES SERIES
End: 2019-01-28
Payer: OTHER GOVERNMENT

## 2019-01-30 ENCOUNTER — HOSPITAL ENCOUNTER (OUTPATIENT)
Dept: PHYSICAL THERAPY | Facility: HOSPITAL | Age: 54
Setting detail: THERAPIES SERIES
Discharge: HOME | End: 2019-01-30
Payer: OTHER GOVERNMENT

## 2019-01-30 PROCEDURE — 97110 THERAPEUTIC EXERCISES: CPT

## 2019-01-30 PROCEDURE — 97140 MANUAL THERAPY 1/> REGIONS: CPT

## 2019-02-01 ENCOUNTER — HOSPITAL ENCOUNTER (OUTPATIENT)
Facility: HOSPITAL | Age: 54
Discharge: HOME | End: 2019-02-01
Payer: OTHER GOVERNMENT

## 2019-02-01 LAB
HCT VFR BLD CALC: 42.8 % (ref 37–47)
HEMOGLOBIN: 14.2 G/DL (ref 11.5–16.5)
MCH RBC QN AUTO: 31.3 PG (ref 27–32)
MCHC RBC AUTO-ENTMCNC: 33.2 G/DL (ref 31–35)
MCV RBC AUTO: 94.3 FL (ref 80–100)
PDW BLD-RTO: 12.6 % (ref 11–16)
PLATELET # BLD: 268 K/UL (ref 150–400)
PMV BLD AUTO: 10.6 FL (ref 6–10)
RBC # BLD: 4.54 M/UL (ref 3.8–5.8)
WBC # BLD: 7.3 K/UL (ref 4–11)

## 2019-02-01 PROCEDURE — 36415 COLL VENOUS BLD VENIPUNCTURE: CPT

## 2019-02-01 PROCEDURE — 85027 COMPLETE CBC AUTOMATED: CPT

## 2019-02-06 ENCOUNTER — HOSPITAL ENCOUNTER (OUTPATIENT)
Dept: MAMMOGRAPHY | Facility: HOSPITAL | Age: 54
Discharge: HOME | End: 2019-02-06
Payer: OTHER GOVERNMENT

## 2019-02-06 DIAGNOSIS — Z12.39 BREAST CANCER SCREENING: ICD-10-CM

## 2019-02-06 PROCEDURE — 77063 BREAST TOMOSYNTHESIS BI: CPT

## 2019-02-14 ENCOUNTER — HOSPITAL ENCOUNTER (OUTPATIENT)
Facility: HOSPITAL | Age: 54
Discharge: HOME | End: 2019-02-14
Payer: OTHER GOVERNMENT

## 2019-02-14 LAB
A/G RATIO: 1.8 (ref 0.8–2)
ALBUMIN SERPL-MCNC: 4.4 G/DL (ref 3.4–4.8)
ALP BLD-CCNC: 78 U/L (ref 25–100)
ALT SERPL-CCNC: 16 U/L (ref 4–36)
ANION GAP SERPL CALCULATED.3IONS-SCNC: 11 MMOL/L (ref 3–16)
AST SERPL-CCNC: 18 U/L (ref 8–33)
BASOPHILS ABSOLUTE: 0 K/UL (ref 0–0.1)
BASOPHILS RELATIVE PERCENT: 0.6 %
BILIRUB SERPL-MCNC: 0.4 MG/DL (ref 0.3–1.2)
BUN BLDV-MCNC: 14 MG/DL (ref 6–20)
CALCIUM SERPL-MCNC: 8.8 MG/DL (ref 8.5–10.5)
CHLORIDE BLD-SCNC: 104 MMOL/L (ref 98–107)
CHOLESTEROL, TOTAL: 187 MG/DL (ref 0–200)
CO2: 23 MMOL/L (ref 20–30)
CREAT SERPL-MCNC: 0.7 MG/DL (ref 0.4–1.2)
EOSINOPHILS ABSOLUTE: 0.2 K/UL (ref 0–0.4)
EOSINOPHILS RELATIVE PERCENT: 3.9 %
FOLATE: >20 NG/ML
GFR AFRICAN AMERICAN: >59
GFR NON-AFRICAN AMERICAN: >60
GLOBULIN: 2.5 G/DL
GLUCOSE BLD-MCNC: 94 MG/DL (ref 74–106)
HCT VFR BLD CALC: 43.4 % (ref 37–47)
HDLC SERPL-MCNC: 49 MG/DL (ref 40–60)
HEMOGLOBIN: 14.1 G/DL (ref 11.5–16.5)
IMMATURE GRANULOCYTES #: 0 K/UL
IMMATURE GRANULOCYTES %: 0.3 % (ref 0–5)
LDL CHOLESTEROL CALCULATED: 95 MG/DL
LYMPHOCYTES ABSOLUTE: 1.7 K/UL (ref 1.5–4)
LYMPHOCYTES RELATIVE PERCENT: 26.7 %
MCH RBC QN AUTO: 31 PG (ref 27–32)
MCHC RBC AUTO-ENTMCNC: 32.5 G/DL (ref 31–35)
MCV RBC AUTO: 95.4 FL (ref 80–100)
MONOCYTES ABSOLUTE: 0.4 K/UL (ref 0.2–0.8)
MONOCYTES RELATIVE PERCENT: 6.8 %
NEUTROPHILS ABSOLUTE: 3.8 K/UL (ref 2–7.5)
NEUTROPHILS RELATIVE PERCENT: 61.7 %
PDW BLD-RTO: 12.6 % (ref 11–16)
PLATELET # BLD: 258 K/UL (ref 150–400)
PMV BLD AUTO: 11.2 FL (ref 6–10)
POTASSIUM SERPL-SCNC: 4.4 MMOL/L (ref 3.4–5.1)
RBC # BLD: 4.55 M/UL (ref 3.8–5.8)
SODIUM BLD-SCNC: 138 MMOL/L (ref 136–145)
TOTAL PROTEIN: 6.9 G/DL (ref 6.4–8.3)
TRIGL SERPL-MCNC: 214 MG/DL (ref 0–249)
TSH SERPL DL<=0.05 MIU/L-ACNC: 1.99 UIU/ML (ref 0.35–5.5)
VITAMIN B-12: 584 PG/ML (ref 211–911)
VLDLC SERPL CALC-MCNC: 43 MG/DL
WBC # BLD: 6.2 K/UL (ref 4–11)

## 2019-02-14 PROCEDURE — 82746 ASSAY OF FOLIC ACID SERUM: CPT

## 2019-02-14 PROCEDURE — 84403 ASSAY OF TOTAL TESTOSTERONE: CPT

## 2019-02-14 PROCEDURE — 82607 VITAMIN B-12: CPT

## 2019-02-14 PROCEDURE — 36415 COLL VENOUS BLD VENIPUNCTURE: CPT

## 2019-02-14 PROCEDURE — 80053 COMPREHEN METABOLIC PANEL: CPT

## 2019-02-14 PROCEDURE — 85025 COMPLETE CBC W/AUTO DIFF WBC: CPT

## 2019-02-14 PROCEDURE — 82671 ASSAY OF ESTROGENS: CPT

## 2019-02-14 PROCEDURE — 84443 ASSAY THYROID STIM HORMONE: CPT

## 2019-02-14 PROCEDURE — 80061 LIPID PANEL: CPT

## 2019-02-16 LAB — TESTOSTERONE TOTAL: 21 NG/DL (ref 20–70)

## 2019-02-18 LAB
ESTRADIOL LEVEL: 174 PG/ML
ESTROGEN TOTAL: 220.7 PG/ML
ESTRONE: 46.7 PG/ML

## 2019-05-09 ENCOUNTER — HOSPITAL ENCOUNTER (OUTPATIENT)
Facility: HOSPITAL | Age: 54
Discharge: HOME | End: 2019-05-09
Payer: OTHER GOVERNMENT

## 2019-05-09 PROCEDURE — 84403 ASSAY OF TOTAL TESTOSTERONE: CPT

## 2019-05-09 PROCEDURE — 84270 ASSAY OF SEX HORMONE GLOBUL: CPT

## 2019-05-09 PROCEDURE — 82671 ASSAY OF ESTROGENS: CPT

## 2019-05-11 LAB
SEX HORMONE BINDING GLOBULIN: 45 NMOL/L (ref 30–135)
TESTOSTERONE FREE-NONMALE: 10.5 PG/ML (ref 0.6–3.8)
TESTOSTERONE TOTAL: 70 NG/DL (ref 20–70)

## 2019-05-13 LAB
ESTRADIOL LEVEL: 194 PG/ML
ESTROGEN TOTAL: 242.4 PG/ML
ESTRONE: 48.4 PG/ML

## 2019-05-22 ENCOUNTER — HOSPITAL ENCOUNTER (OUTPATIENT)
Facility: HOSPITAL | Age: 54
Discharge: HOME | End: 2019-05-22
Payer: OTHER GOVERNMENT

## 2019-05-22 ENCOUNTER — HOSPITAL ENCOUNTER (OUTPATIENT)
Dept: CT IMAGING | Facility: HOSPITAL | Age: 54
Discharge: HOME | End: 2019-05-22
Payer: OTHER GOVERNMENT

## 2019-05-22 DIAGNOSIS — R10.11 RUQ PAIN: ICD-10-CM

## 2019-05-22 PROCEDURE — 74176 CT ABD & PELVIS W/O CONTRAST: CPT

## 2019-07-18 NOTE — FLOWSHEET NOTE
Physical Therapy Daily Treatment / Discharge Note   Date:  2018    TIme In:  1000                   Time Out:      1110    Patient Name:  Hazel Jean    :  1965  MRN: [id number]    Restrictions/Precautions:    Pertinent Medical History:  Medical/Treatment Diagnosis Information:  · Diagnosis: Right shoulder pain Right shoulder pain; s/s consistent with RTC tendinitis, impingment; joint stiffness, muscle weakness  Treatment Diagnosis: Right shoulder pain; s/s consistent with RTC tendinitis, impingment; joint stiffness, muscle weakness  Insurance/Certification information:  PT Insurance Information: [facility] [place]  Physician Information:  Referring Practitioner: ODELL Albarado PA-C  Plan of care signed (Y/N):    Visit# / total visits:    7/    G-Code (if applicable):      Date / Visit # G-Code Applied:    PT G-Codes  Functional Assessment Tool Used: Quick DASH = 23  Functional Limitation: Carrying, moving and handling objects  Carrying, Moving and Handling Objects Current Status (): At least 20 percent but less than 40 percent impaired, limited or restricted  Carrying, Moving and Handling Objects Goal Status (): At least 1 percent but less than 20 percent impaired, limited or restricted    Progress Note: []  Yes  [x]  No  Next due by: Visit #10      Pain level:   1/10  Subjective: Pt reported hr sh is doing better but it still gets sore.    Objective:  Observation:   Test measurements:    Palpation:    Exercises:  Exercise Resistance/Repetitions Other comments   Scap squeezes 2x15 26   R bent over row 2x15 3# 26   Gentle wall push ups 2x15 26   Gentle doorway stretch 15\"x5 26   Pulleys 2' FF/scaption 26   TB R shoulder ext 2x15 blueberry 26   TB R shoulder ER/IR 2x15 chartreuse 26                              Other Therapeutic Activities:      Manual Treatments:   Right shoulder - grade 1-4 mobs, PROM all planes     Modalities:   moist heat with IF e-stim - right

## 2019-08-13 ENCOUNTER — HOSPITAL ENCOUNTER (OUTPATIENT)
Facility: HOSPITAL | Age: 54
Discharge: HOME | End: 2019-08-13
Payer: OTHER GOVERNMENT

## 2019-08-13 LAB — ESTRADIOL LEVEL: 103 PG/ML

## 2019-08-13 PROCEDURE — 82670 ASSAY OF TOTAL ESTRADIOL: CPT

## 2019-08-13 PROCEDURE — 36415 COLL VENOUS BLD VENIPUNCTURE: CPT

## 2019-08-13 PROCEDURE — 84403 ASSAY OF TOTAL TESTOSTERONE: CPT

## 2019-08-16 LAB — MISCELLANEOUS LAB TEST ORDER: ABNORMAL

## 2020-02-04 ENCOUNTER — HOSPITAL ENCOUNTER (OUTPATIENT)
Dept: ULTRASOUND IMAGING | Facility: HOSPITAL | Age: 55
Discharge: HOME | End: 2020-02-04
Payer: OTHER GOVERNMENT

## 2020-02-04 PROCEDURE — 76536 US EXAM OF HEAD AND NECK: CPT

## 2020-04-27 RX ORDER — ASCORBIC ACID 500 MG
500 TABLET ORAL DAILY
COMMUNITY

## 2020-04-27 RX ORDER — LISINOPRIL 10 MG/1
10 TABLET ORAL DAILY
COMMUNITY
End: 2021-09-29

## 2020-04-27 RX ORDER — FOLIC ACID 1 MG/1
1 TABLET ORAL DAILY
COMMUNITY
End: 2020-10-19

## 2020-04-30 ENCOUNTER — TELEMEDICINE (OUTPATIENT)
Dept: BARIATRICS/WEIGHT MGMT | Facility: CLINIC | Age: 55
End: 2020-04-30

## 2020-04-30 VITALS — WEIGHT: 222 LBS | HEIGHT: 61 IN | BODY MASS INDEX: 41.91 KG/M2

## 2020-04-30 DIAGNOSIS — E78.5 HYPERLIPIDEMIA, UNSPECIFIED HYPERLIPIDEMIA TYPE: ICD-10-CM

## 2020-04-30 DIAGNOSIS — E03.9 HYPOTHYROIDISM, UNSPECIFIED TYPE: ICD-10-CM

## 2020-04-30 DIAGNOSIS — E66.01 MORBID OBESITY (HCC): ICD-10-CM

## 2020-04-30 DIAGNOSIS — I10 HYPERTENSION, UNSPECIFIED TYPE: Primary | ICD-10-CM

## 2020-04-30 DIAGNOSIS — R10.13 DYSPEPSIA: ICD-10-CM

## 2020-04-30 PROBLEM — F41.9 ANXIETY: Status: ACTIVE | Noted: 2018-03-01

## 2020-04-30 PROCEDURE — 99204 OFFICE O/P NEW MOD 45 MIN: CPT | Performed by: PHYSICIAN ASSISTANT

## 2020-04-30 RX ORDER — ATORVASTATIN CALCIUM 10 MG/1
10 TABLET, FILM COATED ORAL NIGHTLY
COMMUNITY

## 2020-04-30 NOTE — PROGRESS NOTES
"[facility] BARIATRIC SURGERY  [address]  [address]  [facility] [address] [phone]      Patient  Name:  La Gambino  :  1965      Date of Visit: 2020      Chief Complaint:  weight gain; unable to maintain weight loss    History of Present Illness:  La Gambino is a 55 y.o. female who presents today for evaluation, education and consultation regarding metabolic and bariatric surgery. The patient is interested in sleeve gastrectomy with Dr. Guzmán.     La has been overweight for at least 45 years, has been 35 pounds or more overweight for at least 30 years, has been 100 pounds or more overweight for 9 or more years and started dieting at age 15.  Previous diet attempts include: High Protein, Low Carbohydrate, Low Fat, [facility], Calorie Counting, Cabbage Soup and Fasting; Weight Watchers; Dexatrim, Ionamin/Adipex and Phendiet.  The most weight La lost was 50 pounds FenPhen but was unable to maintain that weight loss.  Her maximum lifetime weight is 222 pounds.    As above, patient has been overweight for many years, with numerous unsuccessful dietary/weight loss attempts.  She now has obesity related comorbidities and as such has decided to pursue metabolic and bariatric surgery.  All past medical, surgical, social and family history have been obtained and discussed today, as pertinent to bariatric surgery.     Past Medical History:   Diagnosis Date   • Anxiety 2018    better now, no meds   • Dyspepsia    • Dyspnea on exertion    • Fatigue    • Folate deficiency    • Frequent headaches     \"extremely tight neck muscles\"   • Hair thinning     on biotin   • Hyperlipidemia    • Hypertension 2012   • Hypothyroidism    • Joint pain    • Morbid obesity (CMS/[facility])    • OAB (overactive bladder)     w/ urgency and stress incontinence   • PONV (postoperative nausea and vomiting)    • Rosacea    • Seasonal allergies    • Vitamin D deficiency    • Wears glasses  "     Past Surgical History:   Procedure Laterality Date   • CERVICAL CONIZATION, LEEP     •  SECTION     • COLONOSCOPY N/A 4/3/2017    Procedure: COLONOSCOPY ;  Surgeon: Prema Phillip MD;  Location: [facility] ENDOSCOPY;  Service:    • DILATATION AND CURETTAGE     • ENDOMETRIAL ABLATION     • GANGLION CYST EXCISION Right    • LASIK  2018   • MASTOPEXY Bilateral    • NASAL SEPTUM SURGERY     • SOFT TISSUE CYST EXCISION  2012    from (L) side   • TOTAL LAPAROSCOPIC HYSTERECTOMY      benign dz, w/ bladder tuck   • WISDOM TOOTH EXTRACTION         No Known Allergies    Current Outpatient Medications:   •  atorvastatin (LIPITOR) 10 MG tablet, Take 10 mg by mouth Daily., Disp: , Rfl:   •  B Complex Vitamins (VITAMIN B COMPLEX PO), Take  by mouth., Disp: , Rfl:   •  Biotin 1000 MCG tablet, Take 1,000 mcg by mouth Daily., Disp: , Rfl:   •  cetirizine (zyrTEC) 10 MG tablet, Take 10 mg by mouth Daily., Disp: , Rfl:   •  Chlorcyclizine-Pseudoephed (Stahist AD) 25-60 MG tablet, Take  by mouth 2 (Two) Times a Day., Disp: , Rfl:   •  Cholecalciferol (VITAMIN D3) 50 MCG (2000 UT) tablet, Take 50 Units by mouth Daily., Disp: , Rfl:   •  folic acid (FOLVITE) 1 MG tablet, Take 1 mg by mouth Daily., Disp: , Rfl:   •  levothyroxine (SYNTHROID, LEVOTHROID) 25 MCG tablet, Take 25 mcg by mouth Daily., Disp: , Rfl:   •  lisinopril (PRINIVIL,ZESTRIL) 10 MG tablet, Take 10 mg by mouth Daily., Disp: , Rfl:   •  loratadine (CLARITIN) 10 MG tablet, Take 10 mg by mouth Daily., Disp: , Rfl:   •  Omega-3 Fatty Acids (FISH OIL) 1000 MG capsule capsule, Take 1,000 mg by mouth Daily With Breakfast., Disp: , Rfl:   •  oxybutynin XL (DITROPAN XL) 10 MG 24 hr tablet, Take 10 mg by mouth Daily., Disp: , Rfl:   •  vitamin C (ASCORBIC ACID) 500 MG tablet, Take 500 mg by mouth Daily., Disp: , Rfl:   •  vitamin E 400 UNIT capsule, Take 400 Units by mouth Daily., Disp: , Rfl:     Social History      Socioeconomic History   • Marital status:      Spouse name: Not on file   • Number of children: Not on file   • Years of education: Not on file   • Highest education level: Not on file   Tobacco Use   • Smoking status: Never Smoker   • Smokeless tobacco: Never Used   Substance and Sexual Activity   • Alcohol use: No   • Drug use: No   • Sexual activity: Yes     Partners: Male     Birth control/protection: Post-menopausal     Comment: Had a hysterectomy....   Social History Narrative    Lives in [place], [place] w/  Christian Gambino, daughter (12yo), and her mother.  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      Family History   Problem Relation Age of Onset   • Arthritis Mother    • Diabetes Mother    • Kidney cancer Mother 69   • Sleep apnea Mother    • Hypertension Mother    • Depression Mother    • Kidney disease Mother    • Miscarriages / Stillbirths Mother    • Lung cancer Father 70   • Heart attack Father    • Stroke Father    • Heart disease Father    • Hypertension Father    • Alcohol abuse Father    • Arthritis Father    • Lung cancer Paternal Grandmother    • Heart attack Paternal Grandfather    • Hypertension Paternal Grandfather    • Hypertension Maternal Grandmother    • Arthritis Maternal Grandmother    • Miscarriages / Stillbirths Maternal Grandmother    • Heart disease Maternal Grandfather    • Heart attack Maternal Grandfather 42   • Hypertension Maternal Grandfather    • Early death Maternal Grandfather    • Arthritis Brother    • Arthritis Paternal Uncle    • Osteosarcoma Brother 16   • Lung cancer Paternal Aunt    • Cancer Paternal Aunt    • Diabetes Maternal Aunt    • Diabetes Maternal Aunt    • Diabetes Maternal Uncle    • Heart disease Maternal Uncle    • Hypertension Maternal Uncle    • Diabetes Maternal Uncle    • Hypertension Maternal Uncle        Review of Systems:  Constitutional:  reports fatigue, weight gain and denies fevers, chills.  HEENT:   denies headache, ear pain or loss of hearing, blurred or double vision, nasal discharge or sore throat.  Cardiovascular:  reports HTN and denies hx heart disease, hx MI, chest pain, palpitations, hx DVT.  Respiratory:  denies cough , wheezing, sleep apnea, asthma, hx PE.  Gastrointestinal:  denies dysphagia, heartburn, nausea, vomiting, abdominal pain, IBS, gallbladder issues, liver disease.  Genitourinary:  denies history of  frequent UTI, hematuria, dysuria, polyuria, renal insufficiency.    Musculoskeletal:  reports joint pain, back pain and denies fibromyalgia and autoimmune disease.  Neurological:  denies migraines, numbness /tingling, dizziness, confusion, seizure.  Psychiatric:  reports hx anxiety and denies depressed mood, hx depression, feeling anxious, bipolar disorder.  Endocrine:  denies glucose intolerance, diabetes.  Hematologic:  denies bruising, bleeding disorder, hx anemia, hx blood transfusion.  Skin:  denies rashes, hx MRSA.    Physical Exam:  Vital Signs:  Weight: 101 kg (222 lb)   Body mass index is 41.95 kg/m².              Note: height & weight patient reported.  Limited exam d/t virtual visit during COVID pandemic    Physical Exam   Constitutional: She is oriented to person, place, and time. She appears well-developed and well-nourished. No distress.   Eyes: No scleral icterus.   Pulmonary/Chest: Effort normal.   Neurological: She is alert and oriented to person, place, and time.   Psychiatric: She has a normal mood and affect.         Assessment:    La Gambino is a 55 y.o. female with medically complicated obesity pursuing sleeve gastrectomy.    Patient Active Problem List   Diagnosis   • Seasonal allergies   • PONV (postoperative nausea and vomiting)   • OAB (overactive bladder)   • Hypothyroidism   • Hypertension   • Hyperlipidemia   • Anxiety   • Morbid obesity (CMS/HCC)   • Fatigue   • Dyspepsia   • Dyspnea on exertion   • Folate deficiency   • Vitamin D deficiency   • Rosacea   • Hair  thinning   • Joint pain   • Frequent headaches       Metabolic and bariatric surgery is deemed medically necessary given the following obesity related comorbidities including hypertension, dyslipidemia and back pain with current Weight: 101 kg (222 lb) and Body mass index is 41.95 kg/m².    Plan:  Patient understands that bariatric surgery is not cosmetic surgery but rather a tool to help make a lifelong commitment to lifestyle changes including diet, exercise, behavior modifications, and healthy habits.  The patient has been educated today on those expected postoperative lifestyle changes.  Psychological and Nutritional consultations will be arranged prior to surgery.  Instructions on how to access [email or web address] (an internet based site w/ educational surgical videos) were given to the patient.  Recommended vitamin supplementation was reviewed.  The importance of avoiding ASA/ NSAIDS/ steroids/ tobacco/ hormones/ immunomodulators perioperatively was discussed in detail.  All questions/concerns have been addressed.      Further evaluation will include: CBC, CMP, Lipids, TSH, H.Pylori UBT, EKG and CXR.    Additional clearances needed prior to surgery will include: Cardiology.       Further input to follow pending the above.          Note: This was an audio and video enabled telemedicine encounter to comply with social distancing guidelines during the COVID-19 pandemic.  Consent was obtained prior to the visit.         TENISHA Garza

## 2020-05-28 ENCOUNTER — HOSPITAL ENCOUNTER (OUTPATIENT)
Dept: MAMMOGRAPHY | Facility: HOSPITAL | Age: 55
Discharge: HOME | End: 2020-05-28
Payer: OTHER GOVERNMENT

## 2020-05-28 PROCEDURE — 77067 SCR MAMMO BI INCL CAD: CPT

## 2020-06-03 ENCOUNTER — LAB (OUTPATIENT)
Dept: LAB | Facility: HOSPITAL | Age: 55
End: 2020-06-03

## 2020-06-03 ENCOUNTER — TRANSCRIBE ORDERS (OUTPATIENT)
Dept: LAB | Facility: HOSPITAL | Age: 55
End: 2020-06-03

## 2020-06-03 DIAGNOSIS — N95.1 SYMPTOMATIC MENOPAUSAL OR FEMALE CLIMACTERIC STATES: Primary | ICD-10-CM

## 2020-06-03 DIAGNOSIS — N95.1 SYMPTOMATIC MENOPAUSAL OR FEMALE CLIMACTERIC STATES: ICD-10-CM

## 2020-06-03 PROCEDURE — 82670 ASSAY OF TOTAL ESTRADIOL: CPT

## 2020-06-03 PROCEDURE — 36415 COLL VENOUS BLD VENIPUNCTURE: CPT

## 2020-06-03 PROCEDURE — 84402 ASSAY OF FREE TESTOSTERONE: CPT

## 2020-06-03 PROCEDURE — 84403 ASSAY OF TOTAL TESTOSTERONE: CPT

## 2020-06-04 LAB — ESTRADIOL SERPL HS-MCNC: 96.9 PG/ML

## 2020-06-08 LAB
TESTOST FREE SERPL-MCNC: 0.6 PG/ML (ref 0–4.2)
TESTOST SERPL-MCNC: <3 NG/DL (ref 3–41)

## 2020-06-15 ENCOUNTER — HOSPITAL ENCOUNTER (OUTPATIENT)
Facility: HOSPITAL | Age: 55
Discharge: HOME | End: 2020-06-15
Payer: OTHER GOVERNMENT

## 2020-06-15 ENCOUNTER — HOSPITAL ENCOUNTER (OUTPATIENT)
Dept: GENERAL RADIOLOGY | Facility: HOSPITAL | Age: 55
Discharge: HOME | End: 2020-06-15
Payer: OTHER GOVERNMENT

## 2020-06-15 PROCEDURE — 73630 X-RAY EXAM OF FOOT: CPT

## 2020-06-16 ENCOUNTER — LAB (OUTPATIENT)
Dept: LAB | Facility: HOSPITAL | Age: 55
End: 2020-06-16

## 2020-06-16 DIAGNOSIS — R10.13 DYSPEPSIA: ICD-10-CM

## 2020-06-16 DIAGNOSIS — E78.5 HYPERLIPIDEMIA, UNSPECIFIED HYPERLIPIDEMIA TYPE: ICD-10-CM

## 2020-06-16 DIAGNOSIS — E03.9 HYPOTHYROIDISM, UNSPECIFIED TYPE: ICD-10-CM

## 2020-06-16 DIAGNOSIS — I10 HYPERTENSION, UNSPECIFIED TYPE: ICD-10-CM

## 2020-06-16 LAB
ALBUMIN SERPL-MCNC: 4.5 G/DL (ref 3.5–5.2)
ALBUMIN/GLOB SERPL: 1.7 G/DL
ALP SERPL-CCNC: 78 U/L (ref 39–117)
ALT SERPL W P-5'-P-CCNC: 28 U/L (ref 1–33)
ANION GAP SERPL CALCULATED.3IONS-SCNC: 10.4 MMOL/L (ref 5–15)
AST SERPL-CCNC: 26 U/L (ref 1–32)
BILIRUB SERPL-MCNC: 0.3 MG/DL (ref 0.2–1.2)
BUN BLD-MCNC: 12 MG/DL (ref 6–20)
BUN/CREAT SERPL: 16.7 (ref 7–25)
CALCIUM SPEC-SCNC: 9.2 MG/DL (ref 8.6–10.5)
CHLORIDE SERPL-SCNC: 101 MMOL/L (ref 98–107)
CHOLEST SERPL-MCNC: 169 MG/DL (ref 0–200)
CO2 SERPL-SCNC: 24.6 MMOL/L (ref 22–29)
CREAT BLD-MCNC: 0.72 MG/DL (ref 0.57–1)
GFR SERPL CREATININE-BSD FRML MDRD: 84 ML/MIN/1.73
GLOBULIN UR ELPH-MCNC: 2.7 GM/DL
GLUCOSE BLD-MCNC: 93 MG/DL (ref 65–99)
HDLC SERPL-MCNC: 40 MG/DL (ref 40–60)
LDLC SERPL CALC-MCNC: 69 MG/DL (ref 0–100)
LDLC/HDLC SERPL: 1.73 {RATIO}
POTASSIUM BLD-SCNC: 4.1 MMOL/L (ref 3.5–5.2)
PROT SERPL-MCNC: 7.2 G/DL (ref 6–8.5)
SODIUM BLD-SCNC: 136 MMOL/L (ref 136–145)
TRIGL SERPL-MCNC: 299 MG/DL (ref 0–150)
VLDLC SERPL-MCNC: 59.8 MG/DL (ref 5–40)

## 2020-06-16 PROCEDURE — 80061 LIPID PANEL: CPT

## 2020-06-16 PROCEDURE — 36415 COLL VENOUS BLD VENIPUNCTURE: CPT

## 2020-06-16 PROCEDURE — 80053 COMPREHEN METABOLIC PANEL: CPT

## 2020-06-16 PROCEDURE — 85025 COMPLETE CBC W/AUTO DIFF WBC: CPT

## 2020-06-16 PROCEDURE — 84443 ASSAY THYROID STIM HORMONE: CPT

## 2020-06-17 LAB
BASOPHILS # BLD AUTO: 0.04 10*3/MM3 (ref 0–0.2)
BASOPHILS NFR BLD AUTO: 0.5 % (ref 0–1.5)
DEPRECATED RDW RBC AUTO: 42.6 FL (ref 37–54)
EOSINOPHIL # BLD AUTO: 0.16 10*3/MM3 (ref 0–0.4)
EOSINOPHIL NFR BLD AUTO: 2.2 % (ref 0.3–6.2)
ERYTHROCYTE [DISTWIDTH] IN BLOOD BY AUTOMATED COUNT: 12.5 % (ref 12.3–15.4)
HCT VFR BLD AUTO: 43.8 % (ref 34–46.6)
HGB BLD-MCNC: 14.9 G/DL (ref 12–15.9)
IMM GRANULOCYTES # BLD AUTO: 0.01 10*3/MM3 (ref 0–0.05)
IMM GRANULOCYTES NFR BLD AUTO: 0.1 % (ref 0–0.5)
LYMPHOCYTES # BLD AUTO: 1.84 10*3/MM3 (ref 0.7–3.1)
LYMPHOCYTES NFR BLD AUTO: 25.3 % (ref 19.6–45.3)
MCH RBC QN AUTO: 31.4 PG (ref 26.6–33)
MCHC RBC AUTO-ENTMCNC: 34 G/DL (ref 31.5–35.7)
MCV RBC AUTO: 92.4 FL (ref 79–97)
MONOCYTES # BLD AUTO: 0.62 10*3/MM3 (ref 0.1–0.9)
MONOCYTES NFR BLD AUTO: 8.5 % (ref 5–12)
NEUTROPHILS # BLD AUTO: 4.61 10*3/MM3 (ref 1.7–7)
NEUTROPHILS NFR BLD AUTO: 63.4 % (ref 42.7–76)
NRBC BLD AUTO-RTO: 0 /100 WBC (ref 0–0.2)
PLATELET # BLD AUTO: 282 10*3/MM3 (ref 140–450)
PMV BLD AUTO: 11.5 FL (ref 6–12)
RBC # BLD AUTO: 4.74 10*6/MM3 (ref 3.77–5.28)
TSH SERPL DL<=0.05 MIU/L-ACNC: 1.67 UIU/ML (ref 0.27–4.2)
WBC NRBC COR # BLD: 7.28 10*3/MM3 (ref 3.4–10.8)

## 2020-07-13 ENCOUNTER — TELEMEDICINE (OUTPATIENT)
Dept: BARIATRICS/WEIGHT MGMT | Facility: CLINIC | Age: 55
End: 2020-07-13

## 2020-07-13 VITALS — HEIGHT: 61 IN | WEIGHT: 213 LBS | BODY MASS INDEX: 40.22 KG/M2

## 2020-07-13 DIAGNOSIS — R10.13 DYSPEPSIA: Primary | ICD-10-CM

## 2020-07-13 PROCEDURE — 99214 OFFICE O/P EST MOD 30 MIN: CPT | Performed by: PHYSICIAN ASSISTANT

## 2020-07-13 NOTE — PROGRESS NOTES
"[facility] BARIATRIC SURGERY  [address]  [address]  [facility] [address]  [phone]      Patient  Name:  La Gambino  :  1965      Date of Visit: 2020      Chief Complaint:  dyspepsia      History of Present Illness:  La Gambino is a 55 y.o. female pursuing LSG with Dr. Guzmán.     La has been overweight for at least 45 years, has been 35 pounds or more overweight for at least 30 years, has been 100 pounds or more overweight for 9 or more years and started dieting at age 15.  Previous diet attempts include: High Protein, Low Carbohydrate, Low Fat, [facility], Calorie Counting, Cabbage Soup and Fasting; Weight Watchers; Dexatrim, Ionamin/Adipex and Phendiet.  The most weight La lost was 50 pounds FenPhen but was unable to maintain that weight loss.  Her maximum lifetime weight is 222 pounds.    Only has heartburn/indigestion w/ onions or italian food.  Does not require medications.  Denies N/V/AP.  H.Pylori testing ordered, but not yet done.  Only has IBS issues if she eats @ [employer].  Denies gallbladder issues or prior eval.        Past Medical History:   Diagnosis Date   • Anxiety 2018    better now, no meds   • Dyspepsia    • Dyspnea on exertion    • Fatigue    • Folate deficiency    • Frequent headaches     \"extremely tight neck muscles\"   • Hair thinning     on biotin   • Hyperlipidemia    • Hypertension 2012   • Hypothyroidism    • Joint pain    • Morbid obesity (CMS/HCC)    • OAB (overactive bladder)     w/ urgency and stress incontinence   • PONV (postoperative nausea and vomiting)    • Rosacea    • Seasonal allergies    • Vitamin D deficiency    • Wears glasses      Past Surgical History:   Procedure Laterality Date   • CERVICAL CONIZATION, LEEP     •  SECTION     • COLONOSCOPY N/A 4/3/2017    Procedure: COLONOSCOPY ;  Surgeon: Prema Phillip MD;  Location: [facility] ENDOSCOPY;  Service:    • DILATATION AND CURETTAGE  " 1996   • ENDOMETRIAL ABLATION  2009   • GANGLION CYST EXCISION Right 1976   • LASIK  2018   • MASTOPEXY Bilateral 2003   • NASAL SEPTUM SURGERY  2005   • SOFT TISSUE CYST EXCISION  2012    from (L) side   • TOTAL LAPAROSCOPIC HYSTERECTOMY  2010    benign dz, w/ bladder tuck   • WISDOM TOOTH EXTRACTION  1983       No Known Allergies    Current Outpatient Medications:   •  atorvastatin (LIPITOR) 10 MG tablet, Take 10 mg by mouth Daily., Disp: , Rfl:   •  B Complex Vitamins (VITAMIN B COMPLEX PO), Take  by mouth., Disp: , Rfl:   •  Biotin 1000 MCG tablet, Take 1,000 mcg by mouth Daily., Disp: , Rfl:   •  cetirizine (zyrTEC) 10 MG tablet, Take 10 mg by mouth Daily., Disp: , Rfl:   •  Chlorcyclizine-Pseudoephed (Stahist AD) 25-60 MG tablet, Take  by mouth 2 (Two) Times a Day., Disp: , Rfl:   •  Cholecalciferol (VITAMIN D3) 50 MCG (2000 UT) tablet, Take 50 Units by mouth Daily., Disp: , Rfl:   •  folic acid (FOLVITE) 1 MG tablet, Take 1 mg by mouth Daily., Disp: , Rfl:   •  levothyroxine (SYNTHROID, LEVOTHROID) 25 MCG tablet, Take 25 mcg by mouth Daily., Disp: , Rfl:   •  lisinopril (PRINIVIL,ZESTRIL) 10 MG tablet, Take 10 mg by mouth Daily., Disp: , Rfl:   •  loratadine (CLARITIN) 10 MG tablet, Take 10 mg by mouth Daily., Disp: , Rfl:   •  oxybutynin XL (DITROPAN XL) 10 MG 24 hr tablet, Take 10 mg by mouth Daily., Disp: , Rfl:   •  vitamin C (ASCORBIC ACID) 500 MG tablet, Take 500 mg by mouth Daily., Disp: , Rfl:   •  vitamin E 400 UNIT capsule, Take 400 Units by mouth Daily., Disp: , Rfl:   •  Omega-3 Fatty Acids (FISH OIL) 1000 MG capsule capsule, Take 1,000 mg by mouth Daily With Breakfast., Disp: , Rfl:     Social History     Socioeconomic History   • Marital status:      Spouse name: Not on file   • Number of children: Not on file   • Years of education: Not on file   • Highest education level: Not on file   Tobacco Use   • Smoking status: Never Smoker   • Smokeless tobacco: Never Used   Substance and Sexual  Activity   • Alcohol use: No   • Drug use: No   • Sexual activity: Yes     Partners: Male     Birth control/protection: Post-menopausal     Comment: Had a hysterectomy....   Social History Narrative    Lives in [place], [place] w/  Christian Gambino, daughter (12yo), and her mother.  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        Physical Exam:  Vital Signs:  Weight: 96.6 kg (213 lb)   Body mass index is 40.25 kg/m².      Physical Exam   Constitutional: She is oriented to person, place, and time. She appears well-developed and well-nourished. No distress.   Eyes: No scleral icterus.   Pulmonary/Chest: Effort normal.   Neurological: She is alert and oriented to person, place, and time.   Psychiatric: She has a normal mood and affect.         Assessment:  55 y.o. female with medically complicated obesity pursuing sleeve gastrectomy.    ICD-10-CM ICD-9-CM   1. Dyspepsia R10.13 536.8       Patient's Body mass index is 40.25 kg/m². BMI is above normal parameters. Recommendations include: MBS.      Plan:  Will schedule EGD w/ Dr. Guzmán to further evaluate.  The risks and benefits of the upper endoscopy were discussed with the patient in detail and all questions were answered.  Possibility of perforation, bleeding, aspiration, and anesthesia reaction were reviewed.  Patient agrees to proceed.      Note: This was an audio and video enabled telemedicine encounter to comply with social distancing guidelines during the COVID-19 pandemic.  Consent was obtained prior to the visit.             TENISHA Garza

## 2020-07-17 ENCOUNTER — APPOINTMENT (OUTPATIENT)
Dept: PREADMISSION TESTING | Facility: HOSPITAL | Age: 55
End: 2020-07-17

## 2020-07-17 LAB
REF LAB TEST METHOD: NORMAL
SARS-COV-2 RNA RESP QL NAA+PROBE: NOT DETECTED

## 2020-07-17 PROCEDURE — U0002 COVID-19 LAB TEST NON-CDC: HCPCS

## 2020-07-17 PROCEDURE — U0004 COV-19 TEST NON-CDC HGH THRU: HCPCS

## 2020-07-17 PROCEDURE — C9803 HOPD COVID-19 SPEC COLLECT: HCPCS

## 2020-07-20 ENCOUNTER — LAB REQUISITION (OUTPATIENT)
Dept: LAB | Facility: HOSPITAL | Age: 55
End: 2020-07-20

## 2020-07-20 ENCOUNTER — LAB (OUTPATIENT)
Dept: LAB | Facility: HOSPITAL | Age: 55
End: 2020-07-20

## 2020-07-20 DIAGNOSIS — R10.13 DYSPEPSIA: ICD-10-CM

## 2020-07-20 DIAGNOSIS — E03.9 HYPOTHYROIDISM, UNSPECIFIED TYPE: ICD-10-CM

## 2020-07-20 DIAGNOSIS — E78.5 HYPERLIPIDEMIA, UNSPECIFIED HYPERLIPIDEMIA TYPE: ICD-10-CM

## 2020-07-20 DIAGNOSIS — R10.817 GENERALIZED ABDOMINAL TENDERNESS: ICD-10-CM

## 2020-07-20 DIAGNOSIS — I10 HYPERTENSION, UNSPECIFIED TYPE: ICD-10-CM

## 2020-07-20 PROCEDURE — 88305 TISSUE EXAM BY PATHOLOGIST: CPT | Performed by: SURGERY

## 2020-07-20 PROCEDURE — 83013 H PYLORI (C-13) BREATH: CPT

## 2020-07-21 LAB
CYTO UR: NORMAL
LAB AP CASE REPORT: NORMAL
LAB AP CLINICAL INFORMATION: NORMAL
PATH REPORT.FINAL DX SPEC: NORMAL
PATH REPORT.GROSS SPEC: NORMAL
UREA BREATH TEST QL: POSITIVE

## 2020-07-21 RX ORDER — CLARITHROMYCIN 500 MG/1
500 TABLET, COATED ORAL 2 TIMES DAILY
Qty: 28 TABLET | Refills: 0 | Status: SHIPPED | OUTPATIENT
Start: 2020-07-21 | End: 2020-08-04

## 2020-07-21 RX ORDER — AMOXICILLIN 500 MG/1
1000 CAPSULE ORAL 2 TIMES DAILY
Qty: 56 CAPSULE | Refills: 0 | Status: SHIPPED | OUTPATIENT
Start: 2020-07-21 | End: 2020-08-04

## 2020-07-21 RX ORDER — OMEPRAZOLE 20 MG/1
20 CAPSULE, DELAYED RELEASE ORAL 2 TIMES DAILY
Qty: 28 CAPSULE | Refills: 0 | Status: SHIPPED | OUTPATIENT
Start: 2020-07-21 | End: 2020-08-04

## 2020-08-04 RX ORDER — FLUCONAZOLE 150 MG/1
150 TABLET ORAL ONCE
Qty: 1 TABLET | Refills: 1 | OUTPATIENT
Start: 2020-08-04 | End: 2020-08-04

## 2020-08-28 ENCOUNTER — HOSPITAL ENCOUNTER (OUTPATIENT)
Facility: HOSPITAL | Age: 55
Discharge: HOME | End: 2020-08-28
Payer: OTHER GOVERNMENT

## 2020-08-28 LAB
A/G RATIO: 1.8 (ref 0.8–2)
ALBUMIN SERPL-MCNC: 4.2 G/DL (ref 3.4–4.8)
ALP BLD-CCNC: 90 U/L (ref 25–100)
ALT SERPL-CCNC: 24 U/L (ref 4–36)
ANION GAP SERPL CALCULATED.3IONS-SCNC: 12 MMOL/L (ref 3–16)
AST SERPL-CCNC: 22 U/L (ref 8–33)
BASOPHILS ABSOLUTE: 0.1 K/UL (ref 0–0.1)
BASOPHILS RELATIVE PERCENT: 0.9 %
BILIRUB SERPL-MCNC: 0.4 MG/DL (ref 0.3–1.2)
BUN BLDV-MCNC: 14 MG/DL (ref 6–20)
CALCIUM SERPL-MCNC: 9.4 MG/DL (ref 8.5–10.5)
CHLORIDE BLD-SCNC: 103 MMOL/L (ref 98–107)
CHOLESTEROL, TOTAL: 166 MG/DL (ref 0–200)
CO2: 22 MMOL/L (ref 20–30)
CREAT SERPL-MCNC: 0.7 MG/DL (ref 0.4–1.2)
EOSINOPHILS ABSOLUTE: 0.2 K/UL (ref 0–0.4)
EOSINOPHILS RELATIVE PERCENT: 2.8 %
FOLATE: 19.58 NG/ML
GFR AFRICAN AMERICAN: >59
GFR NON-AFRICAN AMERICAN: >60
GLOBULIN: 2.4 G/DL
GLUCOSE BLD-MCNC: 93 MG/DL (ref 74–106)
HCT VFR BLD CALC: 45.2 % (ref 37–47)
HDLC SERPL-MCNC: 40 MG/DL (ref 40–60)
HEMOGLOBIN: 14.4 G/DL (ref 11.5–16.5)
IMMATURE GRANULOCYTES #: 0 K/UL
IMMATURE GRANULOCYTES %: 0.4 % (ref 0–5)
LDL CHOLESTEROL CALCULATED: 79 MG/DL
LYMPHOCYTES ABSOLUTE: 1.5 K/UL (ref 1.5–4)
LYMPHOCYTES RELATIVE PERCENT: 26.4 %
MCH RBC QN AUTO: 31 PG (ref 27–32)
MCHC RBC AUTO-ENTMCNC: 31.9 G/DL (ref 31–35)
MCV RBC AUTO: 97.2 FL (ref 80–100)
MONOCYTES ABSOLUTE: 0.4 K/UL (ref 0.2–0.8)
MONOCYTES RELATIVE PERCENT: 6.9 %
NEUTROPHILS ABSOLUTE: 3.5 K/UL (ref 2–7.5)
NEUTROPHILS RELATIVE PERCENT: 62.6 %
PDW BLD-RTO: 12.8 % (ref 11–16)
PLATELET # BLD: 267 K/UL (ref 150–400)
PMV BLD AUTO: 11.5 FL (ref 6–10)
POTASSIUM SERPL-SCNC: 4.5 MMOL/L (ref 3.4–5.1)
RBC # BLD: 4.65 M/UL (ref 3.8–5.8)
SODIUM BLD-SCNC: 137 MMOL/L (ref 136–145)
TOTAL PROTEIN: 6.6 G/DL (ref 6.4–8.3)
TRIGL SERPL-MCNC: 233 MG/DL (ref 0–249)
TSH SERPL DL<=0.05 MIU/L-ACNC: 2.34 UIU/ML (ref 0.35–5.5)
VITAMIN B-12: 752 PG/ML (ref 211–911)
VLDLC SERPL CALC-MCNC: 47 MG/DL
WBC # BLD: 5.7 K/UL (ref 4–11)

## 2020-08-28 PROCEDURE — 85025 COMPLETE CBC W/AUTO DIFF WBC: CPT

## 2020-08-28 PROCEDURE — 84443 ASSAY THYROID STIM HORMONE: CPT

## 2020-08-28 PROCEDURE — 80053 COMPREHEN METABOLIC PANEL: CPT

## 2020-08-28 PROCEDURE — 36415 COLL VENOUS BLD VENIPUNCTURE: CPT

## 2020-08-28 PROCEDURE — 82746 ASSAY OF FOLIC ACID SERUM: CPT

## 2020-08-28 PROCEDURE — 80061 LIPID PANEL: CPT

## 2020-08-28 PROCEDURE — 82607 VITAMIN B-12: CPT

## 2020-09-25 LAB — UREA BREATH TEST QL: POSITIVE

## 2020-09-28 ENCOUNTER — OFFICE VISIT (OUTPATIENT)
Dept: BARIATRICS/WEIGHT MGMT | Facility: CLINIC | Age: 55
End: 2020-09-28

## 2020-09-28 ENCOUNTER — DOCUMENTATION (OUTPATIENT)
Dept: BARIATRICS/WEIGHT MGMT | Facility: CLINIC | Age: 55
End: 2020-09-28

## 2020-09-28 VITALS
DIASTOLIC BLOOD PRESSURE: 66 MMHG | BODY MASS INDEX: 38.38 KG/M2 | OXYGEN SATURATION: 99 % | HEART RATE: 62 BPM | HEIGHT: 60 IN | RESPIRATION RATE: 18 BRPM | TEMPERATURE: 97.6 F | WEIGHT: 195.5 LBS | SYSTOLIC BLOOD PRESSURE: 108 MMHG

## 2020-09-28 DIAGNOSIS — A04.8 H. PYLORI INFECTION: ICD-10-CM

## 2020-09-28 DIAGNOSIS — E66.01 MORBID OBESITY (HCC): Primary | ICD-10-CM

## 2020-09-28 PROCEDURE — 99214 OFFICE O/P EST MOD 30 MIN: CPT | Performed by: PHYSICIAN ASSISTANT

## 2020-09-28 RX ORDER — FLUCONAZOLE 150 MG/1
150 TABLET ORAL ONCE
Qty: 1 TABLET | Refills: 1 | Status: SHIPPED | OUTPATIENT
Start: 2020-09-28 | End: 2020-09-28

## 2020-09-28 RX ORDER — METRONIDAZOLE 250 MG/1
250 TABLET ORAL 4 TIMES DAILY
Qty: 56 TABLET | Refills: 0 | Status: SHIPPED | OUTPATIENT
Start: 2020-09-28 | End: 2020-10-12

## 2020-09-28 RX ORDER — OMEPRAZOLE 20 MG/1
20 CAPSULE, DELAYED RELEASE ORAL 2 TIMES DAILY
Qty: 28 CAPSULE | Refills: 0 | Status: SHIPPED | OUTPATIENT
Start: 2020-09-28 | End: 2020-10-12

## 2020-09-28 RX ORDER — TETRACYCLINE HYDROCHLORIDE 500 MG/1
500 CAPSULE ORAL 4 TIMES DAILY
Qty: 56 CAPSULE | Refills: 0 | Status: SHIPPED | OUTPATIENT
Start: 2020-09-28 | End: 2020-10-12

## 2020-09-28 NOTE — PROGRESS NOTES
"[facility] BARIATRIC SURGERY  [address]  [address]  [facility] [address]  [phone]      Patient  Name:  La Gambino  :  1965      Date of Visit: 2020      Chief Complaint:  weight gain; unable to maintain weight loss    History of Present Illness:  La Gambino is a 55 y.o. female pursuing LSG w/ Dr. Guzmán.     La has been overweight for at least 45 years, has been 35 pounds or more overweight for at least 30 years, has been 100 pounds or more overweight for 9 or more years and started dieting at age 15.  Previous diet attempts include: High Protein, Low Carbohydrate, Low Fat, [facility], Calorie Counting, Cabbage Soup and Fasting; Weight Watchers; Dexatrim, Ionamin/Adipex and Phendiet.  The most weight La lost was 50 pounds FenPhen but was unable to maintain that weight loss.  Her maximum lifetime weight is 222 pounds.    As above, patient has been overweight for many years, with numerous unsuccessful dietary/weight loss attempts.  She now has obesity related comorbidities and as such has decided to pursue metabolic and bariatric surgery.  All past medical, surgical, social and family history have been obtained and discussed today, as pertinent to bariatric surgery.     Note:  Only has indigestion if eating onions or italian dressing.  EGD 20 w/ Dr. Guzmán revealed H.Pylori gastritis.  s/p PrevPak RX.  Repeat UBT (+), pending retreatment w/ Pylera.  O/w no issues/ concerns.  Denies gallbladder symptoms.      Past Medical History:   Diagnosis Date   • Anxiety 2018    better now, no meds   • Dyspepsia    • Dyspnea on exertion    • Fatigue    • Folate deficiency    • Frequent headaches     \"extremely tight neck muscles\"   • H. pylori infection     UBT/EGD bx (+) - PrevPak RX 20, repeat UBT (+) - Pylera RX 20   • Hair thinning     on biotin   • Hyperlipidemia    • Hypertension 2012   • Hypothyroidism    • Joint pain    • Morbid obesity " (CMS/HCC)    • OAB (overactive bladder)     w/ urgency and stress incontinence   • PONV (postoperative nausea and vomiting)    • Rosacea    • Seasonal allergies    • Vitamin D deficiency    • Wears glasses      Past Surgical History:   Procedure Laterality Date   • CERVICAL CONIZATION, LEEP     •  SECTION     • COLONOSCOPY N/A 4/3/2017    Procedure: COLONOSCOPY ;  Surgeon: Prema Phillip MD;  Location: [facility] ENDOSCOPY;  Service:    • DILATATION AND CURETTAGE     • ENDOMETRIAL ABLATION     • GANGLION CYST EXCISION Right    • LASIK     • MASTOPEXY Bilateral    • NASAL SEPTUM SURGERY     • SOFT TISSUE CYST EXCISION  2012    from (L) side   • TOTAL LAPAROSCOPIC HYSTERECTOMY      benign dz, w/ bladder tuck   • WISDOM TOOTH EXTRACTION         No Known Allergies    Current Outpatient Medications:   •  atorvastatin (LIPITOR) 10 MG tablet, Take 10 mg by mouth Daily., Disp: , Rfl:   •  B Complex Vitamins (VITAMIN B COMPLEX PO), Take  by mouth., Disp: , Rfl:   •  Biotin 1000 MCG tablet, Take 1,000 mcg by mouth Daily., Disp: , Rfl:   •  cetirizine (zyrTEC) 10 MG tablet, Take 10 mg by mouth Daily., Disp: , Rfl:   •  Chlorcyclizine-Pseudoephed (Stahist AD) 25-60 MG tablet, Take  by mouth 2 (Two) Times a Day., Disp: , Rfl:   •  Cholecalciferol (VITAMIN D3) 50 MCG (2000 UT) tablet, Take 50 Units by mouth Daily., Disp: , Rfl:   •  folic acid (FOLVITE) 1 MG tablet, Take 1 mg by mouth Daily., Disp: , Rfl:   •  levothyroxine (SYNTHROID, LEVOTHROID) 25 MCG tablet, Take 25 mcg by mouth Daily., Disp: , Rfl:   •  lisinopril (PRINIVIL,ZESTRIL) 10 MG tablet, Take 10 mg by mouth Daily., Disp: , Rfl:   •  loratadine (CLARITIN) 10 MG tablet, Take 10 mg by mouth Daily., Disp: , Rfl:   •  oxybutynin XL (DITROPAN XL) 10 MG 24 hr tablet, Take 10 mg by mouth Daily., Disp: , Rfl:   •  vitamin C (ASCORBIC ACID) 500 MG tablet, Take 500 mg by mouth Daily., Disp: , Rfl:   •  vitamin E 400  UNIT capsule, Take 400 Units by mouth Daily., Disp: , Rfl:   •  Bismuth Subsalicylate 262 MG tablet, Take 524 mg by mouth 4 (Four) Times a Day. Take 2 tablets by mouth 4 times per day., Disp: 112 each, Rfl: 0  •  fluconazole (Diflucan) 150 MG tablet, Take 1 tablet by mouth 1 (One) Time for 1 dose., Disp: 1 tablet, Rfl: 1  •  metroNIDAZOLE (Flagyl) 250 MG tablet, Take 1 tablet by mouth 4 (Four) Times a Day for 14 days., Disp: 56 tablet, Rfl: 0  •  omeprazole (priLOSEC) 20 MG capsule, Take 1 capsule by mouth 2 (Two) Times a Day for 14 days., Disp: 28 capsule, Rfl: 0  •  tetracycline (ACHROMYCIN,SUMYCIN) 500 MG capsule, Take 1 capsule by mouth 4 (Four) Times a Day for 14 days., Disp: 56 capsule, Rfl: 0    Social History     Socioeconomic History   • Marital status:      Spouse name: Not on file   • Number of children: Not on file   • Years of education: Not on file   • Highest education level: Not on file   Tobacco Use   • Smoking status: Never Smoker   • Smokeless tobacco: Never Used   Substance and Sexual Activity   • Alcohol use: No   • Drug use: No   • Sexual activity: Yes     Partners: Male     Birth control/protection: Post-menopausal     Comment: Had a hysterectomy....   Social History Narrative    Lives in [place], [place] w/  Christian Gambino, daughter (12yo), and her mother.  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      Family History   Problem Relation Age of Onset   • Arthritis Mother    • Diabetes Mother    • Kidney cancer Mother 69   • Sleep apnea Mother    • Hypertension Mother    • Depression Mother    • Kidney disease Mother    • Miscarriages / Stillbirths Mother    • Lung cancer Father 70   • Heart attack Father    • Stroke Father    • Heart disease Father    • Hypertension Father    • Alcohol abuse Father    • Arthritis Father    • Lung cancer Paternal Grandmother    • Heart attack Paternal Grandfather    • Hypertension Paternal Grandfather    • Hypertension  Maternal Grandmother    • Arthritis Maternal Grandmother    • Miscarriages / Stillbirths Maternal Grandmother    • Heart disease Maternal Grandfather    • Heart attack Maternal Grandfather 42   • Hypertension Maternal Grandfather    • Early death Maternal Grandfather    • Arthritis Brother    • Arthritis Paternal Uncle    • Osteosarcoma Brother 16   • Lung cancer Paternal Aunt    • Cancer Paternal Aunt    • Diabetes Maternal Aunt    • Diabetes Maternal Aunt    • Diabetes Maternal Uncle    • Heart disease Maternal Uncle    • Hypertension Maternal Uncle    • Diabetes Maternal Uncle    • Hypertension Maternal Uncle        Review of Systems:  Constitutional:  reports fatigue, weight gain and denies fevers, chills.  HEENT:  denies headache, ear pain or loss of hearing, blurred or double vision, nasal discharge or sore throat.  Cardiovascular:  reports HTN and denies hx heart disease, hx MI, chest pain, palpitations, hx DVT.  Respiratory:  denies cough , wheezing, sleep apnea, asthma, hx PE.  Gastrointestinal:  denies dysphagia, heartburn, nausea, vomiting, abdominal pain, IBS, gallbladder issues, liver disease.  Genitourinary:  denies history of  frequent UTI, hematuria, dysuria, polyuria, renal insufficiency.    Musculoskeletal:  reports joint pain, back pain and denies fibromyalgia and autoimmune disease.  Neurological:  denies migraines, numbness /tingling, dizziness, confusion, seizure.  Psychiatric:  reports hx anxiety and denies depressed mood, hx depression, feeling anxious, bipolar disorder.  Endocrine:  denies glucose intolerance, diabetes.  Hematologic:  denies bruising, bleeding disorder, hx anemia, hx blood transfusion.  Skin:  denies rashes, hx MRSA.    ROS reviewed today - accurate.     Physical Exam:  Vital Signs:  Weight: 88.7 kg (195 lb 8 oz)   Body mass index is 38.18 kg/m².  Temp: 97.6 °F (36.4 °C)   Heart Rate: 62   BP: 108/66     Physical Exam   Constitutional: She is oriented to person, place, and  time. She appears well-developed. She is cooperative.   wearing a mask   HENT:   Head: Normocephalic and atraumatic.   Eyes: Conjunctivae are normal. No scleral icterus.   Neck: Neck supple. No thyromegaly present.   Cardiovascular: Normal rate and regular rhythm.   No murmur heard.  Pulmonary/Chest: Effort normal and breath sounds normal. No respiratory distress. She has no wheezes. She has no rales.   Abdominal: Soft. Bowel sounds are normal. She exhibits no distension and no mass. There is no abdominal tenderness. No hernia.   scars: lap hysterectomy, low transverse   Musculoskeletal: Normal range of motion.   Neurological: She is alert and oriented to person, place, and time. Gait normal.   Skin: Skin is warm and dry. No rash noted.   Psychiatric: Judgment normal.   Vitals reviewed.        Assessment:    La Gambino is a 55 y.o. female with medically complicated obesity pursuing sleeve gastrectomy.    Patient Active Problem List   Diagnosis   • Seasonal allergies   • PONV (postoperative nausea and vomiting)   • OAB (overactive bladder)   • Hypothyroidism   • Hypertension   • Hyperlipidemia   • Anxiety   • Morbid obesity (CMS/HCC)   • Fatigue   • Dyspepsia   • Dyspnea on exertion   • Folate deficiency   • Vitamin D deficiency   • Rosacea   • Hair thinning   • Joint pain   • Frequent headaches     Patient's Body mass index is 38.18 kg/m². BMI is above normal parameters. Recommendations include: MBS.  Metabolic and bariatric surgery is deemed medically necessary given the following obesity related comorbidities including hypertension, dyslipidemia and back pain.      Plan:  Patient understands that bariatric surgery is not cosmetic surgery but rather a tool to help make a lifelong commitment to lifestyle changes including diet, exercise, behavior modifications, and healthy habits.  The patient has been educated today on those expected postoperative lifestyle changes.  Psychological and Nutritional consultations  will be arranged prior to surgery.  Instructions on how to access [employer] (an internet based site w/ educational surgical videos) were given to the patient.  Recommended vitamin supplementation was reviewed.  The importance of avoiding ASA/ NSAIDS/ steroids/ tobacco/ hormones/ immunomodulators perioperatively was discussed in detail.  All questions/concerns have been addressed.      Preop evaluation will include: CBC, CMP, Lipids, TSH, H.Pylori UBT, EKG, CXR and EGD.    Additional clearances needed prior to surgery will include: Cardiology.       Further input to follow pending surgical consultation w/ Dr. Guzmán.      TENISHA Garza

## 2020-09-28 NOTE — PROGRESS NOTES
"Metabolic and Bariatric Surgery  Presurgical Nutrition Assessment     La Gambino  2020  [id number]  [id number]  1965  female    Surgery desired: Sleeve Gastrectomy     Ht 152.4 cm (60\")   Wt 88.7 kg (195 lb 8 oz)    Past Medical History:   Diagnosis Date   • Anxiety 2018    better now, no meds   • Dyspepsia    • Dyspnea on exertion    • Fatigue    • Folate deficiency    • Frequent headaches     \"extremely tight neck muscles\"   • H. pylori infection     UBT/EGD bx (+) - PrevPak RX 20, repeat UBT (+) - Pylera RX 20   • Hair thinning     on biotin   • Hyperlipidemia    • Hypertension 2012   • Hypothyroidism    • Joint pain    • Morbid obesity (CMS/HCC)    • OAB (overactive bladder)     w/ urgency and stress incontinence   • PONV (postoperative nausea and vomiting)    • Rosacea    • Seasonal allergies    • Vitamin D deficiency    • Wears glasses      Past Surgical History:   Procedure Laterality Date   • CERVICAL CONIZATION, LEEP     •  SECTION     • COLONOSCOPY N/A 4/3/2017    Procedure: COLONOSCOPY ;  Surgeon: Prema Phillip MD;  Location: [facility] ENDOSCOPY;  Service:    • DILATATION AND CURETTAGE     • ENDOMETRIAL ABLATION     • GANGLION CYST EXCISION Right    • LASIK  2018   • MASTOPEXY Bilateral    • NASAL SEPTUM SURGERY     • SOFT TISSUE CYST EXCISION      from (L) side   • TOTAL LAPAROSCOPIC HYSTERECTOMY      benign dz, w/ bladder tuck   • WISDOM TOOTH EXTRACTION       No Known Allergies    Current Outpatient Medications:   •  atorvastatin (LIPITOR) 10 MG tablet, Take 10 mg by mouth Daily., Disp: , Rfl:   •  B Complex Vitamins (VITAMIN B COMPLEX PO), Take  by mouth., Disp: , Rfl:   •  Biotin 1000 MCG tablet, Take 1,000 mcg by mouth Daily., Disp: , Rfl:   •  Bismuth Subsalicylate 262 MG tablet, Take 524 mg by mouth 4 (Four) Times a Day. Take 2 tablets by mouth 4 times per day., Disp: 112 each, Rfl: 0  •  cetirizine (zyrTEC) " 10 MG tablet, Take 10 mg by mouth Daily., Disp: , Rfl:   •  Chlorcyclizine-Pseudoephed (Stahist AD) 25-60 MG tablet, Take  by mouth 2 (Two) Times a Day., Disp: , Rfl:   •  Cholecalciferol (VITAMIN D3) 50 MCG (2000 UT) tablet, Take 50 Units by mouth Daily., Disp: , Rfl:   •  fluconazole (Diflucan) 150 MG tablet, Take 1 tablet by mouth 1 (One) Time for 1 dose., Disp: 1 tablet, Rfl: 1  •  folic acid (FOLVITE) 1 MG tablet, Take 1 mg by mouth Daily., Disp: , Rfl:   •  levothyroxine (SYNTHROID, LEVOTHROID) 25 MCG tablet, Take 25 mcg by mouth Daily., Disp: , Rfl:   •  lisinopril (PRINIVIL,ZESTRIL) 10 MG tablet, Take 10 mg by mouth Daily., Disp: , Rfl:   •  loratadine (CLARITIN) 10 MG tablet, Take 10 mg by mouth Daily., Disp: , Rfl:   •  metroNIDAZOLE (Flagyl) 250 MG tablet, Take 1 tablet by mouth 4 (Four) Times a Day for 14 days., Disp: 56 tablet, Rfl: 0  •  omeprazole (priLOSEC) 20 MG capsule, Take 1 capsule by mouth 2 (Two) Times a Day for 14 days., Disp: 28 capsule, Rfl: 0  •  oxybutynin XL (DITROPAN XL) 10 MG 24 hr tablet, Take 10 mg by mouth Daily., Disp: , Rfl:   •  tetracycline (ACHROMYCIN,SUMYCIN) 500 MG capsule, Take 1 capsule by mouth 4 (Four) Times a Day for 14 days., Disp: 56 capsule, Rfl: 0  •  vitamin C (ASCORBIC ACID) 500 MG tablet, Take 500 mg by mouth Daily., Disp: , Rfl:   •  vitamin E 400 UNIT capsule, Take 400 Units by mouth Daily., Disp: , Rfl:       Nutrition Assessment    Estimated energy needs: 1600    Estimated calories for weight loss: 1200    IBW (Pounds):  130      Excess body weight (Pounds): 65       Nutrition Recall  24 Hour recall: (B) (L) (D) -  Reviewed and discussed with patient  9:30am:  Protein drink with strawberries  3pm:  3/4 steak and cheese quesadilla, chips, salsa, cheese dip, diet coke  6:30pm:  Salad with ranch, kermit cup, diet coke  water    Exercise  intermittently      Education    Provided manual for Sleeve Gastrectomy and reviewed necessary vitamin and protein  supplementation for surgery.     Provided follow-up options for support, including contact information for dietitians here, if desired.  Web-based support information and apps for smart phones and computers given.  Noted that support group is offered at this clinic, and that support is associated with weight loss.    Recommend that team follow per protocol.      Nutrition Goals   Dietary Guidelines per manual  Protein goal:  grams per day   Eliminate soda    Exercise Goals  Continue current exercise routine   Add 15-30 minutes of activity per day as tolerated        Stephenie Adan RD  09/28/2020  12:07 EDT

## 2020-10-12 DIAGNOSIS — R06.00 DYSPNEA, UNSPECIFIED TYPE: Primary | ICD-10-CM

## 2020-10-12 DIAGNOSIS — R53.83 FATIGUE, UNSPECIFIED TYPE: ICD-10-CM

## 2020-10-12 RX ORDER — FLUCONAZOLE 150 MG/1
150 TABLET ORAL ONCE
Qty: 1 TABLET | Refills: 1 | OUTPATIENT
Start: 2020-10-12 | End: 2020-10-12

## 2020-10-13 ENCOUNTER — LAB (OUTPATIENT)
Dept: LAB | Facility: HOSPITAL | Age: 55
End: 2020-10-13

## 2020-10-13 ENCOUNTER — HOSPITAL ENCOUNTER (OUTPATIENT)
Dept: GENERAL RADIOLOGY | Facility: HOSPITAL | Age: 55
Discharge: HOME | End: 2020-10-13

## 2020-10-13 DIAGNOSIS — R06.00 DYSPNEA, UNSPECIFIED TYPE: ICD-10-CM

## 2020-10-13 DIAGNOSIS — R53.83 FATIGUE, UNSPECIFIED TYPE: ICD-10-CM

## 2020-10-13 LAB
DEPRECATED RDW RBC AUTO: 42.1 FL (ref 37–54)
ERYTHROCYTE [DISTWIDTH] IN BLOOD BY AUTOMATED COUNT: 12.4 % (ref 12.3–15.4)
HCT VFR BLD AUTO: 40.7 % (ref 34–46.6)
HGB BLD-MCNC: 14.1 G/DL (ref 12–15.9)
MCH RBC QN AUTO: 32 PG (ref 26.6–33)
MCHC RBC AUTO-ENTMCNC: 34.6 G/DL (ref 31.5–35.7)
MCV RBC AUTO: 92.5 FL (ref 79–97)
PLATELET # BLD AUTO: 251 10*3/MM3 (ref 140–450)
PMV BLD AUTO: 11.7 FL (ref 6–12)
RBC # BLD AUTO: 4.4 10*6/MM3 (ref 3.77–5.28)
WBC # BLD AUTO: 8.01 10*3/MM3 (ref 3.4–10.8)

## 2020-10-13 PROCEDURE — 71046 X-RAY EXAM CHEST 2 VIEWS: CPT

## 2020-10-13 PROCEDURE — 36415 COLL VENOUS BLD VENIPUNCTURE: CPT

## 2020-10-13 PROCEDURE — 80053 COMPREHEN METABOLIC PANEL: CPT

## 2020-10-13 PROCEDURE — 85027 COMPLETE CBC AUTOMATED: CPT

## 2020-10-14 LAB
ALBUMIN SERPL-MCNC: 4.1 G/DL (ref 3.5–5.2)
ALBUMIN/GLOB SERPL: 1.5 G/DL
ALP SERPL-CCNC: 93 U/L (ref 39–117)
ALT SERPL W P-5'-P-CCNC: 39 U/L (ref 1–33)
ANION GAP SERPL CALCULATED.3IONS-SCNC: 12.5 MMOL/L (ref 5–15)
AST SERPL-CCNC: 49 U/L (ref 1–32)
BILIRUB SERPL-MCNC: <0.2 MG/DL (ref 0–1.2)
BUN SERPL-MCNC: 14 MG/DL (ref 6–20)
BUN/CREAT SERPL: 21.2 (ref 7–25)
CALCIUM SPEC-SCNC: 8.9 MG/DL (ref 8.6–10.5)
CHLORIDE SERPL-SCNC: 104 MMOL/L (ref 98–107)
CO2 SERPL-SCNC: 20.5 MMOL/L (ref 22–29)
CREAT SERPL-MCNC: 0.66 MG/DL (ref 0.57–1)
GFR SERPL CREATININE-BSD FRML MDRD: 93 ML/MIN/1.73
GLOBULIN UR ELPH-MCNC: 2.8 GM/DL
GLUCOSE SERPL-MCNC: 76 MG/DL (ref 65–99)
POTASSIUM SERPL-SCNC: 4.6 MMOL/L (ref 3.5–5.2)
PROT SERPL-MCNC: 6.9 G/DL (ref 6–8.5)
SODIUM SERPL-SCNC: 137 MMOL/L (ref 136–145)

## 2020-10-15 ENCOUNTER — HOSPITAL ENCOUNTER (OUTPATIENT)
Dept: CARDIOLOGY | Facility: HOSPITAL | Age: 55
Discharge: HOME | End: 2020-10-15
Admitting: PHYSICIAN ASSISTANT

## 2020-10-15 ENCOUNTER — PREP FOR SURGERY (OUTPATIENT)
Dept: OTHER | Facility: HOSPITAL | Age: 55
End: 2020-10-15

## 2020-10-15 DIAGNOSIS — R06.00 DYSPNEA, UNSPECIFIED TYPE: ICD-10-CM

## 2020-10-15 DIAGNOSIS — R53.83 FATIGUE, UNSPECIFIED TYPE: ICD-10-CM

## 2020-10-15 DIAGNOSIS — E66.01 MORBID EXOGENOUS OBESITY (HCC): Primary | ICD-10-CM

## 2020-10-15 PROCEDURE — 93005 ELECTROCARDIOGRAM TRACING: CPT | Performed by: PHYSICIAN ASSISTANT

## 2020-10-15 RX ORDER — ACETAMINOPHEN 500 MG
1000 TABLET ORAL ONCE
Status: CANCELLED | OUTPATIENT
Start: 2020-10-15 | End: 2020-10-15

## 2020-10-15 RX ORDER — CHLORHEXIDINE GLUCONATE 0.12 MG/ML
30 RINSE ORAL
Status: CANCELLED | OUTPATIENT
Start: 2020-10-15 | End: 2020-10-15

## 2020-10-15 RX ORDER — SCOLOPAMINE TRANSDERMAL SYSTEM 1 MG/1
1 PATCH, EXTENDED RELEASE TRANSDERMAL ONCE
Status: CANCELLED | OUTPATIENT
Start: 2020-10-15 | End: 2020-10-15

## 2020-10-15 RX ORDER — PANTOPRAZOLE SODIUM 40 MG/10ML
40 INJECTION, POWDER, LYOPHILIZED, FOR SOLUTION INTRAVENOUS ONCE
Status: CANCELLED | OUTPATIENT
Start: 2020-10-15 | End: 2020-10-15

## 2020-10-15 RX ORDER — SODIUM CHLORIDE 0.9 % (FLUSH) 0.9 %
3-10 SYRINGE (ML) INJECTION AS NEEDED
Status: CANCELLED | OUTPATIENT
Start: 2020-10-15

## 2020-10-15 RX ORDER — SODIUM CHLORIDE, SODIUM LACTATE, POTASSIUM CHLORIDE, CALCIUM CHLORIDE 600; 310; 30; 20 MG/100ML; MG/100ML; MG/100ML; MG/100ML
150 INJECTION, SOLUTION INTRAVENOUS CONTINUOUS
Status: CANCELLED | OUTPATIENT
Start: 2020-10-15

## 2020-10-15 RX ORDER — SODIUM CHLORIDE 0.9 % (FLUSH) 0.9 %
3 SYRINGE (ML) INJECTION EVERY 12 HOURS SCHEDULED
Status: CANCELLED | OUTPATIENT
Start: 2020-10-15

## 2020-10-15 RX ORDER — GABAPENTIN 300 MG/1
600 CAPSULE ORAL ONCE
Status: CANCELLED | OUTPATIENT
Start: 2020-10-15 | End: 2020-10-15

## 2020-10-15 RX ORDER — CEFAZOLIN SODIUM 2 G/100ML
2 INJECTION, SOLUTION INTRAVENOUS ONCE
Status: CANCELLED | OUTPATIENT
Start: 2020-10-15 | End: 2020-10-15

## 2020-10-19 ENCOUNTER — APPOINTMENT (OUTPATIENT)
Dept: PREADMISSION TESTING | Facility: HOSPITAL | Age: 55
End: 2020-10-19

## 2020-10-19 DIAGNOSIS — E66.01 MORBID EXOGENOUS OBESITY (HCC): ICD-10-CM

## 2020-10-19 LAB
ABO GROUP BLD: NORMAL
BLD GP AB SCN SERPL QL: NEGATIVE
DEPRECATED RDW RBC AUTO: 44.6 FL (ref 37–54)
ERYTHROCYTE [DISTWIDTH] IN BLOOD BY AUTOMATED COUNT: 12.8 % (ref 12.3–15.4)
HBA1C MFR BLD: 5.5 % (ref 4.8–5.6)
HCT VFR BLD AUTO: 44.6 % (ref 34–46.6)
HGB BLD-MCNC: 14.7 G/DL (ref 12–15.9)
MCH RBC QN AUTO: 31.3 PG (ref 26.6–33)
MCHC RBC AUTO-ENTMCNC: 33 G/DL (ref 31.5–35.7)
MCV RBC AUTO: 95.1 FL (ref 79–97)
PLATELET # BLD AUTO: 256 10*3/MM3 (ref 140–450)
PMV BLD AUTO: 10.6 FL (ref 6–12)
POTASSIUM SERPL-SCNC: 4.6 MMOL/L (ref 3.5–5.2)
RBC # BLD AUTO: 4.69 10*6/MM3 (ref 3.77–5.28)
RH BLD: POSITIVE
SARS-COV-2 RNA RESP QL NAA+PROBE: NOT DETECTED
T&S EXPIRATION DATE: NORMAL
WBC # BLD AUTO: 5.86 10*3/MM3 (ref 3.4–10.8)

## 2020-10-19 PROCEDURE — 36415 COLL VENOUS BLD VENIPUNCTURE: CPT

## 2020-10-19 PROCEDURE — 86900 BLOOD TYPING SEROLOGIC ABO: CPT | Performed by: SURGERY

## 2020-10-19 PROCEDURE — 83036 HEMOGLOBIN GLYCOSYLATED A1C: CPT | Performed by: SURGERY

## 2020-10-19 PROCEDURE — 86850 RBC ANTIBODY SCREEN: CPT | Performed by: SURGERY

## 2020-10-19 PROCEDURE — 86901 BLOOD TYPING SEROLOGIC RH(D): CPT | Performed by: SURGERY

## 2020-10-19 PROCEDURE — C9803 HOPD COVID-19 SPEC COLLECT: HCPCS

## 2020-10-19 PROCEDURE — U0004 COV-19 TEST NON-CDC HGH THRU: HCPCS

## 2020-10-19 PROCEDURE — 84132 ASSAY OF SERUM POTASSIUM: CPT | Performed by: SURGERY

## 2020-10-19 PROCEDURE — 85027 COMPLETE CBC AUTOMATED: CPT | Performed by: SURGERY

## 2020-10-20 ENCOUNTER — ANESTHESIA EVENT (OUTPATIENT)
Dept: PERIOP | Facility: HOSPITAL | Age: 55
End: 2020-10-20

## 2020-10-20 ENCOUNTER — CONSULT (OUTPATIENT)
Dept: BARIATRICS/WEIGHT MGMT | Facility: CLINIC | Age: 55
End: 2020-10-20

## 2020-10-20 VITALS
HEIGHT: 60 IN | OXYGEN SATURATION: 99 % | BODY MASS INDEX: 38.97 KG/M2 | SYSTOLIC BLOOD PRESSURE: 110 MMHG | TEMPERATURE: 97.8 F | WEIGHT: 198.5 LBS | RESPIRATION RATE: 18 BRPM | HEART RATE: 72 BPM | DIASTOLIC BLOOD PRESSURE: 66 MMHG

## 2020-10-20 DIAGNOSIS — E78.5 HYPERLIPIDEMIA, UNSPECIFIED HYPERLIPIDEMIA TYPE: ICD-10-CM

## 2020-10-20 DIAGNOSIS — M25.50 ARTHRALGIA, UNSPECIFIED JOINT: ICD-10-CM

## 2020-10-20 DIAGNOSIS — A04.8 H. PYLORI INFECTION: Primary | ICD-10-CM

## 2020-10-20 PROCEDURE — 99214 OFFICE O/P EST MOD 30 MIN: CPT | Performed by: SURGERY

## 2020-10-20 RX ORDER — FAMOTIDINE 20 MG/1
20 TABLET, FILM COATED ORAL ONCE
Status: CANCELLED | OUTPATIENT
Start: 2020-10-20 | End: 2020-10-20

## 2020-10-20 RX ORDER — FAMOTIDINE 10 MG/ML
20 INJECTION, SOLUTION INTRAVENOUS ONCE
Status: CANCELLED | OUTPATIENT
Start: 2020-10-20 | End: 2020-10-20

## 2020-10-20 RX ORDER — SODIUM CHLORIDE 0.9 % (FLUSH) 0.9 %
10 SYRINGE (ML) INJECTION AS NEEDED
Status: CANCELLED | OUTPATIENT
Start: 2020-10-20

## 2020-10-20 RX ORDER — SODIUM CHLORIDE 0.9 % (FLUSH) 0.9 %
10 SYRINGE (ML) INJECTION EVERY 12 HOURS SCHEDULED
Status: CANCELLED | OUTPATIENT
Start: 2020-10-20

## 2020-10-20 NOTE — PROGRESS NOTES
[facility] BARIATRIC SURGERY  [address]  [address]  [facility] [id number]  [phone]      Patient  Name:  La Gambino  :  1965      Date of Visit: 10/20/2020    Chief Complaint:  weight gain; unable to maintain weight loss.   Evaluate for possible metabolic and bariatric surgery    History of Present Illness:  La Gambino is a 55 y.o. female who presents today for evaluation, education and consultation regarding metabolic and bariatric surgery (MBS).  Since last seen 2020 she has gained 3 pounds.  The patient returns for final visit prior to metabolic and bariatric surgery specifically the sleeve gastrectomy.  Original intake evaluation Bibi Sutton PA-C dated 2020 reviewed.      The patient has had issues with morbid obesity for years and only temporary success with non-surgical methods of weight loss.  The patient is seeking LSG to help with the morbid obesity related conditions of anxiety, dyspnea exertion, fatigue, folate deficiency, frequent headaches, H. pylori gastritis, hyperlipidemia, hypertension, hypothyroidism, joint pain, overactive bladder, rosacea, vitamin D deficiency, incomplete right bundle branch block, irritable bowel syndrome, elevated transaminases.    55-year-old morbidly obese white female from [facility].  She is actually on the surgery schedule for tomorrow.  She is very organized and has a large binder and several written questions which were addressed.  She says she has been essentially asymptomatic with her H. pylori and has noticed no difference with her treatments.  She did retest positive and has taken a second round of treatment and she is encouraged to follow-up and make sure testing is ultimately negative.  We discussed that if she is still positive she may want to seek GI referral and voiced understanding that there is an increased risk of malignancy with untreated H. pylori gastritis.  Her relatives live  "next-door on both sides of her house and are smokers and she voiced clear understanding about tobacco, nicotine, and secondhand smoke policy and says she will comply.            Past Medical History:   Diagnosis Date   • Anxiety 2018    better now, no meds   • Dyspepsia    • Dyspnea on exertion    • Elevated transaminase level    • Fatigue    • Folate deficiency    • Frequent headaches     \"extremely tight neck muscles\"   • H. pylori infection     UBT/EGD bx (+) - PrevPak RX 20, repeat UBT (+) - Pylera RX 20   • Hair thinning     on biotin   • Hyperlipidemia    • Hypertension 2012   • Hypothyroidism    • IBS (irritable bowel syndrome)    • Incomplete right bundle branch block    • Joint pain    • Morbid obesity (CMS/HCC)    • OAB (overactive bladder)     w/ urgency and stress incontinence   • PONV (postoperative nausea and vomiting)    • Rosacea    • Seasonal allergies    • Vitamin D deficiency    • Wears glasses      Past Surgical History:   Procedure Laterality Date   • CERVICAL CONIZATION, LEEP     •  SECTION     • COLONOSCOPY N/A 4/3/2017    Procedure: COLONOSCOPY ;  Surgeon: Prema Phillip MD;  Location: [facility] ENDOSCOPY;  Service:    • CYST REMOVAL     • DILATATION AND CURETTAGE     • ENDOMETRIAL ABLATION     • LASIK     • MASTOPEXY Bilateral    • NASAL SEPTUM SURGERY     • SOFT TISSUE CYST EXCISION      from (L) side   • TOTAL LAPAROSCOPIC HYSTERECTOMY      benign dz, w/ bladder tuck   • WISDOM TOOTH EXTRACTION         No Known Allergies    Current Outpatient Medications:   •  APPLE CIDER VINEGAR PO, Take 2 tablets by mouth 2 (two) times a day., Disp: , Rfl:   •  atorvastatin (LIPITOR) 10 MG tablet, Take 10 mg by mouth Daily., Disp: , Rfl:   •  Biotin 1000 MCG tablet, Take 1,000 mcg by mouth Daily., Disp: , Rfl:   •  cetirizine (zyrTEC) 10 MG tablet, Take 10 mg by mouth Daily., Disp: , Rfl:   •  Chlorcyclizine-Pseudoephed (Stahist " AD) 25-60 MG tablet, Take 1 tablet by mouth 2 (Two) Times a Day., Disp: , Rfl:   •  Cholecalciferol (VITAMIN D3) 50 MCG (2000 UT) tablet, Take 50 Units by mouth Daily., Disp: , Rfl:   •  levothyroxine (SYNTHROID, LEVOTHROID) 25 MCG tablet, Take 25 mcg by mouth Daily., Disp: , Rfl:   •  lisinopril (PRINIVIL,ZESTRIL) 10 MG tablet, Take 10 mg by mouth Daily., Disp: , Rfl:   •  loratadine (CLARITIN) 10 MG tablet, Take 10 mg by mouth Daily., Disp: , Rfl:   •  Multiple Vitamins-Minerals (MULTIVITAMIN ADULT PO), Take  by mouth., Disp: , Rfl:   •  oxybutynin XL (DITROPAN XL) 10 MG 24 hr tablet, Take 10 mg by mouth Daily., Disp: , Rfl:   •  vitamin E 400 UNIT capsule, Take 400 Units by mouth Daily., Disp: , Rfl:   •  B Complex Vitamins (VITAMIN B COMPLEX PO), Take 1 tablet by mouth Daily., Disp: , Rfl:   •  vitamin C (ASCORBIC ACID) 500 MG tablet, Take 500 mg by mouth Daily., Disp: , Rfl:     Social History     Socioeconomic History   • Marital status:      Spouse name: Not on file   • Number of children: Not on file   • Years of education: Not on file   • Highest education level: Not on file   Tobacco Use   • Smoking status: Never Smoker   • Smokeless tobacco: Never Used   Substance and Sexual Activity   • Alcohol use: No   • Drug use: No   • Sexual activity: Yes     Partners: Male     Birth control/protection: Post-menopausal     Comment: Had a hysterectomy....   Social History Narrative    Lives in [place], [place] w/  Christian Gambino, daughter (14yo), and her mother.  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      Family History   Problem Relation Age of Onset   • Arthritis Mother    • Diabetes Mother    • Kidney cancer Mother 69   • Sleep apnea Mother    • Hypertension Mother    • Depression Mother    • Kidney disease Mother    • Miscarriages / Stillbirths Mother    • Lung cancer Father 70   • Heart attack Father    • Stroke Father    • Heart disease Father    • Hypertension Father    •  Alcohol abuse Father    • Arthritis Father    • Lung cancer Paternal Grandmother    • Heart attack Paternal Grandfather    • Hypertension Paternal Grandfather    • Hypertension Maternal Grandmother    • Arthritis Maternal Grandmother    • Miscarriages / Stillbirths Maternal Grandmother    • Heart disease Maternal Grandfather    • Heart attack Maternal Grandfather 42   • Hypertension Maternal Grandfather    • Early death Maternal Grandfather    • Arthritis Brother    • Arthritis Paternal Uncle    • Osteosarcoma Brother 16   • Lung cancer Paternal Aunt    • Cancer Paternal Aunt    • Diabetes Maternal Aunt    • Diabetes Maternal Aunt    • Diabetes Maternal Uncle    • Heart disease Maternal Uncle    • Hypertension Maternal Uncle    • Diabetes Maternal Uncle    • Hypertension Maternal Uncle        Review of Systems   Constitutional: Positive for fatigue and unexpected weight gain. Negative for chills, diaphoresis, fever and unexpected weight loss.   HENT: Negative for congestion and facial swelling.    Eyes: Negative for blurred vision, double vision and discharge.   Respiratory: Negative for chest tightness, shortness of breath and stridor.    Cardiovascular: Negative for chest pain, palpitations and leg swelling.   Gastrointestinal: Negative for blood in stool.   Endocrine: Negative for polydipsia.   Genitourinary: Negative for hematuria.   Musculoskeletal: Positive for arthralgias.   Skin: Negative for color change.   Allergic/Immunologic: Negative for immunocompromised state.   Neurological: Negative for confusion.   Psychiatric/Behavioral: Negative for self-injury.       I have reviewed the ROS and confirm that it's accurate today.    Physical Exam:  Vital Signs:  Weight: 90 kg (198 lb 8 oz)   Body mass index is 38.77 kg/m².  Temp: 97.8 °F (36.6 °C)   Heart Rate: 72   BP: 110/66     Physical Exam  Vitals signs reviewed.   Constitutional:       Appearance: She is well-developed.   HENT:      Head: Normocephalic and  atraumatic.      Nose:      Comments: mask  Eyes:      Conjunctiva/sclera: Conjunctivae normal.      Pupils: Pupils are equal, round, and reactive to light.   Neck:      Musculoskeletal: Normal range of motion and neck supple.      Thyroid: No thyromegaly.      Vascular: No carotid bruit.      Trachea: No tracheal deviation.   Cardiovascular:      Rate and Rhythm: Normal rate and regular rhythm.      Heart sounds: Normal heart sounds.   Pulmonary:      Effort: Pulmonary effort is normal. No respiratory distress.      Breath sounds: Normal breath sounds.   Abdominal:      General: There is no distension.      Palpations: Abdomen is soft.      Tenderness: There is no abdominal tenderness.      Comments: Laparoscopy scars and low transverse scar   Musculoskeletal: Normal range of motion.         General: No deformity.   Skin:     General: Skin is warm and dry.      Findings: No rash.   Neurological:      Mental Status: She is alert and oriented to person, place, and time.      Cranial Nerves: No cranial nerve deficit.      Coordination: Coordination normal.   Psychiatric:         Behavior: Behavior normal.         Thought Content: Thought content normal.         Judgment: Judgment normal.         Patient Active Problem List   Diagnosis   • Seasonal allergies   • PONV (postoperative nausea and vomiting)   • OAB (overactive bladder)   • Hypothyroidism   • Hypertension   • Hyperlipidemia   • Anxiety   • Morbid obesity (CMS/HCC)   • Fatigue   • Dyspepsia   • Dyspnea on exertion   • Folate deficiency   • Vitamin D deficiency   • Rosacea   • Hair thinning   • Joint pain   • Frequent headaches   • Morbid exogenous obesity (CMS/HCC)       Assessment:    La Gambino is a 55 y.o. year old female with medically complicated obesity.    Metabolic and bariatric surgery is deemed medically necessary given the following obesity related comorbidities including anxiety, dyspnea exertion, fatigue, folate deficiency, frequent  headaches, H. pylori gastritis, hyperlipidemia, hypertension, hypothyroidism, joint pain, overactive bladder, rosacea, vitamin D deficiency, incomplete right bundle branch block, irritable bowel syndrome, elevated transaminases with current Weight: 90 kg (198 lb 8 oz) and Body mass index is 38.77 kg/m²..      Patient is aware that surgery is a tool, and that weight loss and improvement in comorbidities is not guaranteed but only seen in the context of appropriate use, follow up and physical activity.    The patient was present for an approximately a 2.5 hour discussion of the purpose of MBS, how MBS is a tool to assist in achieving weight loss goals, the most common complications and how best to avoid them, and the strategies for short and long term weight loss and improvement in comorbidities.  Ample opportunity to discuss questions was available both in group and during the time of individual examination.    I reviewed her CBC and CMP dated 10/19/2020 which are unremarkable except for CO2 of 20.5 ALT of 39 AST of 49.  Chest x-ray dated 10/14/2020 no active process.  EKG dated 10/15/2020 showing normal sinus rhythm with incomplete right bundle branch block and nonspecific T wave abnormality prolonged QT when compared with ECG of April 14, 2015 incomplete right bundle branch block is now present.  Psychosocial evaluation dated 5/20/2020 and 6/3/2020.  Elias PhD appropriate candidate.  Dietitian evaluation dated 9/28/2020 Stephenie Adan RD, EGD dated 7/20/2020 showing diffuse H. pylori gastritis more pronounced in the fundus no hiatal hernia Z-line 40 cm.  I noted at that time that one of her friends that had sleeve gastrectomy performed and that the patient had some dyspeptic symptoms with Italian food but it was otherwise asymptomatic.  Antral biopsies showed chronic active gastritis with H. pylori and gastric fundus biopsy showed focally active chronic gastritis H. pylori present.  Distal esophageal biopsies showed  "mild reactive chronic changes compatible with reflux.  H. pylori breath test was positive dated 9/24/2020.  Labs dated 6/16/2020 showed a normal TSH normal CMP triglycerides 299 VLDL elevated at 59.8 normal CBC cardiology clearance at low risk dated 8/18/2020 and Marco Antonio Baeza MD.  Please see scanned records that I have reviewed and signed off on today.  All of this in addition to the patient's unique history and exam has been taken into consideration in determining their appropriate candidacy for MBS.    Complications  of laparoscopic/possible robotic gastric sleeve were discussed. The patient is well aware of the potential complications of surgery that include but not limited to bleeding, infections, deep venous thrombosis, pulmonary embolism, pulmonary complications such as pneumonia, cardiac events, hernias, small bowel obstruction, damage to the spleen or other organs, bowel injury, disfiguring scars, failure to lose weight, need for additional surgery, conversion to an open procedure, and death. Patient is also aware of complications which apply in this particular procedure that can include but are not limited to a \"leak\" at the staple line which in some instances may require conversion to gastric bypass.    The patient is aware if a hiatal hernia is encountered, it likely will be repaired.  R/B/A Rx to hiatal hernia repair were discussed as outlined in our long consent form.  Briefly risks in addition to those for LSG include recurrent hernia, CEASAR, dysphagia, esophageal injury, pneumothorax, injury to the vagus nerves, injury to the thoracic duct, aorta or vena cava.    I discussed avoiding all tobacco products and second hand smoke at least 2 weeks pre-operatively and 6 weeks post-operatively to minimize the risk of sleeve leak.  This included discussing the importance of avoiding even secondhand smoke as the risk of leak is increased.  Examples discussed:  I made it very clear that the patient understands " they should avoid even riding in a car where someone has previously smoked in the last 2 weeks, living in a house where someone smokes (even if it's in a separate room/patio/attached garage, etc.) we discussed that they should not have a conversation with a group of people who are smoking even if it's outside.  They can be around wood burning fires and barbecue.  I told them I do not know if marijuana has a same effects but my overall recommendation is to avoid it for 2 weeks prior in 6 weeks after surgery.  They also are aware that nicotine may also increase the risk of leak and I strongly encouraged him to avoid that as well for 2 weeks prior in 6 weeks after surgery.    Discussed the risks, benefits and alternative therapies at great length as outlined in our extensive consent forms, consent videos, and educational teaching process under the direction of the center's .    A copy of the patient's signed informed consent is on file.    R/B/A Rx discussed to postop anticoagulation incl but not limited to bleeding, drug reaction, venothromboembolic events, etc. and the patient declined.        Plan: After evaluation today I think the patient is a reasonable candidate for laparoscopic sleeve gastrectomy.  Other issues include anxiety, dyspnea exertion, fatigue, folate deficiency, frequent headaches, H. pylori gastritis, hyperlipidemia, hypertension, hypothyroidism, joint pain, overactive bladder, rosacea, vitamin D deficiency, incomplete right bundle branch block, irritable bowel syndrome, elevated transaminases, PONV.        Rajinder Guzmán MD              Answers for HPI/ROS submitted by the patient on 10/18/2020   What is the primary reason for your visit?: Other  Please describe your symptoms.: Symptom:  being obese.    This is an appt for me to meet one-on-one with Dr. Guzmán to get my final approval for surgery.  Have you had these symptoms before?: Yes  How long have you been having these  symptoms?: Greater than 2 weeks  Please list any medications you are currently taking for this condition.: Stahist-AD:  2/day, Elderberry Gummies -100 m/day, Cetirizine-10 m/day, Oxybutynin-10m/day, Apple Cider Vinegar:   4/day , , Vitamin D-5000 IU:  1/day, Vutamin E-400 IU:  1/day , Centrum Silver multi-vitamin:  1/day, Lisinopril-10 m/day, Biotin -1000 mc/day, Lipitor-10 m/day, Probiotics-2/day , , Plus:  I have purchased the vitamins you want me to take post-surgery....  Please describe any probable cause for these symptoms. : I am overweight and have struggled with my weight my entire life.  I have tried many different diets and exercise programs.   Could it possibly be genetic - or at least partly genetic?   Everyone in my family is overweight.

## 2020-10-20 NOTE — H&P (VIEW-ONLY)
[facility] BARIATRIC SURGERY  [address]  [address]  [facility] [address]  [phone]      Patient  Name:  La Gambino  :  1965      Date of Visit: 10/20/2020    Chief Complaint:  weight gain; unable to maintain weight loss.   Evaluate for possible metabolic and bariatric surgery    History of Present Illness:  La Gambino is a 55 y.o. female who presents today for evaluation, education and consultation regarding metabolic and bariatric surgery (MBS).  Since last seen 2020 she has gained 3 pounds.  The patient returns for final visit prior to metabolic and bariatric surgery specifically the sleeve gastrectomy.  Original intake evaluation Bibi Sutton PA-C dated 2020 reviewed.      The patient has had issues with morbid obesity for years and only temporary success with non-surgical methods of weight loss.  The patient is seeking LSG to help with the morbid obesity related conditions of anxiety, dyspnea exertion, fatigue, folate deficiency, frequent headaches, H. pylori gastritis, hyperlipidemia, hypertension, hypothyroidism, joint pain, overactive bladder, rosacea, vitamin D deficiency, incomplete right bundle branch block, irritable bowel syndrome, elevated transaminases.    55-year-old morbidly obese white female from [facility].  She is actually on the surgery schedule for tomorrow.  She is very organized and has a large binder and several written questions which were addressed.  She says she has been essentially asymptomatic with her H. pylori and has noticed no difference with her treatments.  She did retest positive and has taken a second round of treatment and she is encouraged to follow-up and make sure testing is ultimately negative.  We discussed that if she is still positive she may want to seek GI referral and voiced understanding that there is an increased risk of malignancy with untreated H. pylori gastritis.  Her relatives live  "next-door on both sides of her house and are smokers and she voiced clear understanding about tobacco, nicotine, and secondhand smoke policy and says she will comply.            Past Medical History:   Diagnosis Date   • Anxiety 2018    better now, no meds   • Dyspepsia    • Dyspnea on exertion    • Elevated transaminase level    • Fatigue    • Folate deficiency    • Frequent headaches     \"extremely tight neck muscles\"   • H. pylori infection     UBT/EGD bx (+) - PrevPak RX 20, repeat UBT (+) - Pylera RX 20   • Hair thinning     on biotin   • Hyperlipidemia    • Hypertension 2012   • Hypothyroidism    • IBS (irritable bowel syndrome)    • Incomplete right bundle branch block    • Joint pain    • Morbid obesity (CMS/HCC)    • OAB (overactive bladder)     w/ urgency and stress incontinence   • PONV (postoperative nausea and vomiting)    • Rosacea    • Seasonal allergies    • Vitamin D deficiency    • Wears glasses      Past Surgical History:   Procedure Laterality Date   • CERVICAL CONIZATION, LEEP     •  SECTION     • COLONOSCOPY N/A 4/3/2017    Procedure: COLONOSCOPY ;  Surgeon: Prema Phillip MD;  Location: [facility] ENDOSCOPY;  Service:    • CYST REMOVAL     • DILATATION AND CURETTAGE     • ENDOMETRIAL ABLATION     • LASIK     • MASTOPEXY Bilateral    • NASAL SEPTUM SURGERY     • SOFT TISSUE CYST EXCISION      from (L) side   • TOTAL LAPAROSCOPIC HYSTERECTOMY      benign dz, w/ bladder tuck   • WISDOM TOOTH EXTRACTION         No Known Allergies    Current Outpatient Medications:   •  APPLE CIDER VINEGAR PO, Take 2 tablets by mouth 2 (two) times a day., Disp: , Rfl:   •  atorvastatin (LIPITOR) 10 MG tablet, Take 10 mg by mouth Daily., Disp: , Rfl:   •  Biotin 1000 MCG tablet, Take 1,000 mcg by mouth Daily., Disp: , Rfl:   •  cetirizine (zyrTEC) 10 MG tablet, Take 10 mg by mouth Daily., Disp: , Rfl:   •  Chlorcyclizine-Pseudoephed (Stahist " AD) 25-60 MG tablet, Take 1 tablet by mouth 2 (Two) Times a Day., Disp: , Rfl:   •  Cholecalciferol (VITAMIN D3) 50 MCG (2000 UT) tablet, Take 50 Units by mouth Daily., Disp: , Rfl:   •  levothyroxine (SYNTHROID, LEVOTHROID) 25 MCG tablet, Take 25 mcg by mouth Daily., Disp: , Rfl:   •  lisinopril (PRINIVIL,ZESTRIL) 10 MG tablet, Take 10 mg by mouth Daily., Disp: , Rfl:   •  loratadine (CLARITIN) 10 MG tablet, Take 10 mg by mouth Daily., Disp: , Rfl:   •  Multiple Vitamins-Minerals (MULTIVITAMIN ADULT PO), Take  by mouth., Disp: , Rfl:   •  oxybutynin XL (DITROPAN XL) 10 MG 24 hr tablet, Take 10 mg by mouth Daily., Disp: , Rfl:   •  vitamin E 400 UNIT capsule, Take 400 Units by mouth Daily., Disp: , Rfl:   •  B Complex Vitamins (VITAMIN B COMPLEX PO), Take 1 tablet by mouth Daily., Disp: , Rfl:   •  vitamin C (ASCORBIC ACID) 500 MG tablet, Take 500 mg by mouth Daily., Disp: , Rfl:     Social History     Socioeconomic History   • Marital status:      Spouse name: Not on file   • Number of children: Not on file   • Years of education: Not on file   • Highest education level: Not on file   Tobacco Use   • Smoking status: Never Smoker   • Smokeless tobacco: Never Used   Substance and Sexual Activity   • Alcohol use: No   • Drug use: No   • Sexual activity: Yes     Partners: Male     Birth control/protection: Post-menopausal     Comment: Had a hysterectomy....   Social History Narrative    Lives in [place], [place] w/  Christian Gambino, daughter (12yo), and her mother.  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      Family History   Problem Relation Age of Onset   • Arthritis Mother    • Diabetes Mother    • Kidney cancer Mother 69   • Sleep apnea Mother    • Hypertension Mother    • Depression Mother    • Kidney disease Mother    • Miscarriages / Stillbirths Mother    • Lung cancer Father 70   • Heart attack Father    • Stroke Father    • Heart disease Father    • Hypertension Father    •  Alcohol abuse Father    • Arthritis Father    • Lung cancer Paternal Grandmother    • Heart attack Paternal Grandfather    • Hypertension Paternal Grandfather    • Hypertension Maternal Grandmother    • Arthritis Maternal Grandmother    • Miscarriages / Stillbirths Maternal Grandmother    • Heart disease Maternal Grandfather    • Heart attack Maternal Grandfather 42   • Hypertension Maternal Grandfather    • Early death Maternal Grandfather    • Arthritis Brother    • Arthritis Paternal Uncle    • Osteosarcoma Brother 16   • Lung cancer Paternal Aunt    • Cancer Paternal Aunt    • Diabetes Maternal Aunt    • Diabetes Maternal Aunt    • Diabetes Maternal Uncle    • Heart disease Maternal Uncle    • Hypertension Maternal Uncle    • Diabetes Maternal Uncle    • Hypertension Maternal Uncle        Review of Systems   Constitutional: Positive for fatigue and unexpected weight gain. Negative for chills, diaphoresis, fever and unexpected weight loss.   HENT: Negative for congestion and facial swelling.    Eyes: Negative for blurred vision, double vision and discharge.   Respiratory: Negative for chest tightness, shortness of breath and stridor.    Cardiovascular: Negative for chest pain, palpitations and leg swelling.   Gastrointestinal: Negative for blood in stool.   Endocrine: Negative for polydipsia.   Genitourinary: Negative for hematuria.   Musculoskeletal: Positive for arthralgias.   Skin: Negative for color change.   Allergic/Immunologic: Negative for immunocompromised state.   Neurological: Negative for confusion.   Psychiatric/Behavioral: Negative for self-injury.       I have reviewed the ROS and confirm that it's accurate today.    Physical Exam:  Vital Signs:  Weight: 90 kg (198 lb 8 oz)   Body mass index is 38.77 kg/m².  Temp: 97.8 °F (36.6 °C)   Heart Rate: 72   BP: 110/66     Physical Exam  Vitals signs reviewed.   Constitutional:       Appearance: She is well-developed.   HENT:      Head: Normocephalic and  atraumatic.      Nose:      Comments: mask  Eyes:      Conjunctiva/sclera: Conjunctivae normal.      Pupils: Pupils are equal, round, and reactive to light.   Neck:      Musculoskeletal: Normal range of motion and neck supple.      Thyroid: No thyromegaly.      Vascular: No carotid bruit.      Trachea: No tracheal deviation.   Cardiovascular:      Rate and Rhythm: Normal rate and regular rhythm.      Heart sounds: Normal heart sounds.   Pulmonary:      Effort: Pulmonary effort is normal. No respiratory distress.      Breath sounds: Normal breath sounds.   Abdominal:      General: There is no distension.      Palpations: Abdomen is soft.      Tenderness: There is no abdominal tenderness.      Comments: Laparoscopy scars and low transverse scar   Musculoskeletal: Normal range of motion.         General: No deformity.   Skin:     General: Skin is warm and dry.      Findings: No rash.   Neurological:      Mental Status: She is alert and oriented to person, place, and time.      Cranial Nerves: No cranial nerve deficit.      Coordination: Coordination normal.   Psychiatric:         Behavior: Behavior normal.         Thought Content: Thought content normal.         Judgment: Judgment normal.         Patient Active Problem List   Diagnosis   • Seasonal allergies   • PONV (postoperative nausea and vomiting)   • OAB (overactive bladder)   • Hypothyroidism   • Hypertension   • Hyperlipidemia   • Anxiety   • Morbid obesity (CMS/HCC)   • Fatigue   • Dyspepsia   • Dyspnea on exertion   • Folate deficiency   • Vitamin D deficiency   • Rosacea   • Hair thinning   • Joint pain   • Frequent headaches   • Morbid exogenous obesity (CMS/HCC)       Assessment:    La Gambino is a 55 y.o. year old female with medically complicated obesity.    Metabolic and bariatric surgery is deemed medically necessary given the following obesity related comorbidities including anxiety, dyspnea exertion, fatigue, folate deficiency, frequent  headaches, H. pylori gastritis, hyperlipidemia, hypertension, hypothyroidism, joint pain, overactive bladder, rosacea, vitamin D deficiency, incomplete right bundle branch block, irritable bowel syndrome, elevated transaminases with current Weight: 90 kg (198 lb 8 oz) and Body mass index is 38.77 kg/m²..      Patient is aware that surgery is a tool, and that weight loss and improvement in comorbidities is not guaranteed but only seen in the context of appropriate use, follow up and physical activity.    The patient was present for an approximately a 2.5 hour discussion of the purpose of MBS, how MBS is a tool to assist in achieving weight loss goals, the most common complications and how best to avoid them, and the strategies for short and long term weight loss and improvement in comorbidities.  Ample opportunity to discuss questions was available both in group and during the time of individual examination.    I reviewed her CBC and CMP dated 10/19/2020 which are unremarkable except for CO2 of 20.5 ALT of 39 AST of 49.  Chest x-ray dated 10/14/2020 no active process.  EKG dated 10/15/2020 showing normal sinus rhythm with incomplete right bundle branch block and nonspecific T wave abnormality prolonged QT when compared with ECG of April 14, 2015 incomplete right bundle branch block is now present.  Psychosocial evaluation dated 5/20/2020 and 6/3/2020.  Elias PhD appropriate candidate.  Dietitian evaluation dated 9/28/2020 Stephenie Adan RD, EGD dated 7/20/2020 showing diffuse H. pylori gastritis more pronounced in the fundus no hiatal hernia Z-line 40 cm.  I noted at that time that one of her friends that had sleeve gastrectomy performed and that the patient had some dyspeptic symptoms with Italian food but it was otherwise asymptomatic.  Antral biopsies showed chronic active gastritis with H. pylori and gastric fundus biopsy showed focally active chronic gastritis H. pylori present.  Distal esophageal biopsies showed  "mild reactive chronic changes compatible with reflux.  H. pylori breath test was positive dated 9/24/2020.  Labs dated 6/16/2020 showed a normal TSH normal CMP triglycerides 299 VLDL elevated at 59.8 normal CBC cardiology clearance at low risk dated 8/18/2020 and Marco Antonio Baeza MD.  Please see scanned records that I have reviewed and signed off on today.  All of this in addition to the patient's unique history and exam has been taken into consideration in determining their appropriate candidacy for MBS.    Complications  of laparoscopic/possible robotic gastric sleeve were discussed. The patient is well aware of the potential complications of surgery that include but not limited to bleeding, infections, deep venous thrombosis, pulmonary embolism, pulmonary complications such as pneumonia, cardiac events, hernias, small bowel obstruction, damage to the spleen or other organs, bowel injury, disfiguring scars, failure to lose weight, need for additional surgery, conversion to an open procedure, and death. Patient is also aware of complications which apply in this particular procedure that can include but are not limited to a \"leak\" at the staple line which in some instances may require conversion to gastric bypass.    The patient is aware if a hiatal hernia is encountered, it likely will be repaired.  R/B/A Rx to hiatal hernia repair were discussed as outlined in our long consent form.  Briefly risks in addition to those for LSG include recurrent hernia, CEASAR, dysphagia, esophageal injury, pneumothorax, injury to the vagus nerves, injury to the thoracic duct, aorta or vena cava.    I discussed avoiding all tobacco products and second hand smoke at least 2 weeks pre-operatively and 6 weeks post-operatively to minimize the risk of sleeve leak.  This included discussing the importance of avoiding even secondhand smoke as the risk of leak is increased.  Examples discussed:  I made it very clear that the patient understands " they should avoid even riding in a car where someone has previously smoked in the last 2 weeks, living in a house where someone smokes (even if it's in a separate room/patio/attached garage, etc.) we discussed that they should not have a conversation with a group of people who are smoking even if it's outside.  They can be around wood burning fires and barbecue.  I told them I do not know if marijuana has a same effects but my overall recommendation is to avoid it for 2 weeks prior in 6 weeks after surgery.  They also are aware that nicotine may also increase the risk of leak and I strongly encouraged him to avoid that as well for 2 weeks prior in 6 weeks after surgery.    Discussed the risks, benefits and alternative therapies at great length as outlined in our extensive consent forms, consent videos, and educational teaching process under the direction of the center's .    A copy of the patient's signed informed consent is on file.    R/B/A Rx discussed to postop anticoagulation incl but not limited to bleeding, drug reaction, venothromboembolic events, etc. and the patient declined.        Plan: After evaluation today I think the patient is a reasonable candidate for laparoscopic sleeve gastrectomy.  Other issues include anxiety, dyspnea exertion, fatigue, folate deficiency, frequent headaches, H. pylori gastritis, hyperlipidemia, hypertension, hypothyroidism, joint pain, overactive bladder, rosacea, vitamin D deficiency, incomplete right bundle branch block, irritable bowel syndrome, elevated transaminases, PONV.        Rajinder Guzmán MD              Answers for HPI/ROS submitted by the patient on 10/18/2020   What is the primary reason for your visit?: Other  Please describe your symptoms.: Symptom:  being obese.    This is an appt for me to meet one-on-one with Dr. Guzmán to get my final approval for surgery.  Have you had these symptoms before?: Yes  How long have you been having these  symptoms?: Greater than 2 weeks  Please list any medications you are currently taking for this condition.: Stahist-AD:  2/day, Elderberry Gummies -100 m/day, Cetirizine-10 m/day, Oxybutynin-10m/day, Apple Cider Vinegar:   4/day , , Vitamin D-5000 IU:  1/day, Vutamin E-400 IU:  1/day , Centrum Silver multi-vitamin:  1/day, Lisinopril-10 m/day, Biotin -1000 mc/day, Lipitor-10 m/day, Probiotics-2/day , , Plus:  I have purchased the vitamins you want me to take post-surgery....  Please describe any probable cause for these symptoms. : I am overweight and have struggled with my weight my entire life.  I have tried many different diets and exercise programs.   Could it possibly be genetic - or at least partly genetic?   Everyone in my family is overweight.

## 2020-10-21 ENCOUNTER — ANESTHESIA (OUTPATIENT)
Dept: PERIOP | Facility: HOSPITAL | Age: 55
End: 2020-10-21

## 2020-10-21 ENCOUNTER — HOSPITAL ENCOUNTER (INPATIENT)
Facility: HOSPITAL | Age: 55
LOS: 1 days | Discharge: HOME | End: 2020-10-22
Attending: SURGERY | Admitting: SURGERY

## 2020-10-21 DIAGNOSIS — E66.01 MORBID EXOGENOUS OBESITY (HCC): ICD-10-CM

## 2020-10-21 PROCEDURE — 25010000002 ONDANSETRON PER 1 MG: Performed by: SURGERY

## 2020-10-21 PROCEDURE — 25010000002 ENOXAPARIN PER 10 MG: Performed by: SURGERY

## 2020-10-21 PROCEDURE — 43775 LAP SLEEVE GASTRECTOMY: CPT | Performed by: SURGERY

## 2020-10-21 PROCEDURE — 25010000002 PROPOFOL 10 MG/ML EMULSION: Performed by: NURSE ANESTHETIST, CERTIFIED REGISTERED

## 2020-10-21 PROCEDURE — 88342 IMHCHEM/IMCYTCHM 1ST ANTB: CPT | Performed by: SURGERY

## 2020-10-21 PROCEDURE — 25010000002 METOCLOPRAMIDE PER 10 MG: Performed by: SURGERY

## 2020-10-21 PROCEDURE — 25010000002 NEOSTIGMINE 10 MG/10ML SOLUTION: Performed by: NURSE ANESTHETIST, CERTIFIED REGISTERED

## 2020-10-21 PROCEDURE — 25010000002 BUPRENORPHINE PER 0.1 MG: Performed by: ANESTHESIOLOGY

## 2020-10-21 PROCEDURE — 0DJ08ZZ INSPECTION OF UPPER INTESTINAL TRACT, VIA NATURAL OR ARTIFICIAL OPENING ENDOSCOPIC: ICD-10-PCS | Performed by: SURGERY

## 2020-10-21 PROCEDURE — 25010000002 FENTANYL CITRATE (PF) 100 MCG/2ML SOLUTION: Performed by: NURSE ANESTHETIST, CERTIFIED REGISTERED

## 2020-10-21 PROCEDURE — 94799 UNLISTED PULMONARY SVC/PX: CPT

## 2020-10-21 PROCEDURE — 25010000002 DEXAMETHASONE SODIUM PHOSPHATE 10 MG/ML SOLUTION: Performed by: ANESTHESIOLOGY

## 2020-10-21 PROCEDURE — 0DB64Z3 EXCISION OF STOMACH, PERCUTANEOUS ENDOSCOPIC APPROACH, VERTICAL: ICD-10-PCS | Performed by: SURGERY

## 2020-10-21 PROCEDURE — 25010000003 CEFAZOLIN IN DEXTROSE 2-4 GM/100ML-% SOLUTION: Performed by: SURGERY

## 2020-10-21 PROCEDURE — 88307 TISSUE EXAM BY PATHOLOGIST: CPT | Performed by: SURGERY

## 2020-10-21 PROCEDURE — 25010000002 HYDROMORPHONE 1 MG/ML SOLUTION: Performed by: SURGERY

## 2020-10-21 DEVICE — REINFORCED INTELLIGENT RELOAD, FOR USE WITH SIGNIA STAPLING SYSTEM
Type: IMPLANTABLE DEVICE | Site: STOMACH | Status: FUNCTIONAL
Brand: TRI-STAPLE 2.0

## 2020-10-21 DEVICE — SEALANT WND FIBRIN TISSEEL PREFIL/SYR/PRIMAFZ 4ML: Type: IMPLANTABLE DEVICE | Site: STOMACH | Status: FUNCTIONAL

## 2020-10-21 DEVICE — BLACK REINFORCED INTELLIGENT RELOAD, FOR USE WITH SIGNIA STAPLING SYSTEM
Type: IMPLANTABLE DEVICE | Site: STOMACH | Status: FUNCTIONAL
Brand: TRI-STAPLE 2.0

## 2020-10-21 RX ORDER — MAGNESIUM HYDROXIDE 1200 MG/15ML
LIQUID ORAL AS NEEDED
Status: DISCONTINUED | OUTPATIENT
Start: 2020-10-21 | End: 2020-10-21 | Stop reason: HOSPADM

## 2020-10-21 RX ORDER — FENTANYL CITRATE 50 UG/ML
50 INJECTION, SOLUTION INTRAMUSCULAR; INTRAVENOUS
Status: DISCONTINUED | OUTPATIENT
Start: 2020-10-21 | End: 2020-10-21 | Stop reason: HOSPADM

## 2020-10-21 RX ORDER — LORAZEPAM 1 MG/1
1 TABLET ORAL EVERY 12 HOURS PRN
Status: DISCONTINUED | OUTPATIENT
Start: 2020-10-21 | End: 2020-10-22 | Stop reason: HOSPADM

## 2020-10-21 RX ORDER — SCOLOPAMINE TRANSDERMAL SYSTEM 1 MG/1
1 PATCH, EXTENDED RELEASE TRANSDERMAL ONCE
Status: DISCONTINUED | OUTPATIENT
Start: 2020-10-21 | End: 2020-10-21

## 2020-10-21 RX ORDER — ALPRAZOLAM 0.25 MG/1
0.25 TABLET ORAL ONCE AS NEEDED
Status: DISCONTINUED | OUTPATIENT
Start: 2020-10-21 | End: 2020-10-22 | Stop reason: HOSPADM

## 2020-10-21 RX ORDER — OXYBUTYNIN CHLORIDE 10 MG/1
10 TABLET, EXTENDED RELEASE ORAL DAILY
Status: DISCONTINUED | OUTPATIENT
Start: 2020-10-22 | End: 2020-10-22 | Stop reason: HOSPADM

## 2020-10-21 RX ORDER — CYANOCOBALAMIN 1000 UG/ML
1000 INJECTION, SOLUTION INTRAMUSCULAR; SUBCUTANEOUS ONCE
Status: COMPLETED | OUTPATIENT
Start: 2020-10-22 | End: 2020-10-22

## 2020-10-21 RX ORDER — ROCURONIUM BROMIDE 10 MG/ML
INJECTION, SOLUTION INTRAVENOUS AS NEEDED
Status: DISCONTINUED | OUTPATIENT
Start: 2020-10-21 | End: 2020-10-21 | Stop reason: SURG

## 2020-10-21 RX ORDER — NALOXONE HCL 0.4 MG/ML
0.1 VIAL (ML) INJECTION
Status: DISCONTINUED | OUTPATIENT
Start: 2020-10-21 | End: 2020-10-22 | Stop reason: HOSPADM

## 2020-10-21 RX ORDER — BUPIVACAINE HYDROCHLORIDE 2.5 MG/ML
INJECTION, SOLUTION EPIDURAL; INFILTRATION; INTRACAUDAL
Status: COMPLETED | OUTPATIENT
Start: 2020-10-21 | End: 2020-10-21

## 2020-10-21 RX ORDER — HYDROMORPHONE HYDROCHLORIDE 1 MG/ML
0.5 INJECTION, SOLUTION INTRAMUSCULAR; INTRAVENOUS; SUBCUTANEOUS
Status: DISCONTINUED | OUTPATIENT
Start: 2020-10-21 | End: 2020-10-21 | Stop reason: HOSPADM

## 2020-10-21 RX ORDER — SODIUM CHLORIDE 9 MG/ML
INJECTION, SOLUTION INTRAVENOUS AS NEEDED
Status: DISCONTINUED | OUTPATIENT
Start: 2020-10-21 | End: 2020-10-21 | Stop reason: HOSPADM

## 2020-10-21 RX ORDER — PROPOFOL 10 MG/ML
VIAL (ML) INTRAVENOUS AS NEEDED
Status: DISCONTINUED | OUTPATIENT
Start: 2020-10-21 | End: 2020-10-21 | Stop reason: SURG

## 2020-10-21 RX ORDER — DEXAMETHASONE SODIUM PHOSPHATE 10 MG/ML
INJECTION, SOLUTION INTRAMUSCULAR; INTRAVENOUS
Status: COMPLETED | OUTPATIENT
Start: 2020-10-21 | End: 2020-10-21

## 2020-10-21 RX ORDER — HYDRALAZINE HYDROCHLORIDE 20 MG/ML
10 INJECTION INTRAMUSCULAR; INTRAVENOUS
Status: DISCONTINUED | OUTPATIENT
Start: 2020-10-21 | End: 2020-10-22 | Stop reason: HOSPADM

## 2020-10-21 RX ORDER — NALOXONE HCL 0.4 MG/ML
0.4 VIAL (ML) INJECTION
Status: DISCONTINUED | OUTPATIENT
Start: 2020-10-21 | End: 2020-10-22 | Stop reason: HOSPADM

## 2020-10-21 RX ORDER — CEFAZOLIN SODIUM 2 G/100ML
2 INJECTION, SOLUTION INTRAVENOUS EVERY 8 HOURS
Status: COMPLETED | OUTPATIENT
Start: 2020-10-21 | End: 2020-10-22

## 2020-10-21 RX ORDER — PROCHLORPERAZINE MALEATE 10 MG
10 TABLET ORAL EVERY 6 HOURS PRN
Status: DISCONTINUED | OUTPATIENT
Start: 2020-10-21 | End: 2020-10-22 | Stop reason: HOSPADM

## 2020-10-21 RX ORDER — CEFAZOLIN SODIUM 2 G/100ML
2 INJECTION, SOLUTION INTRAVENOUS ONCE
Status: COMPLETED | OUTPATIENT
Start: 2020-10-21 | End: 2020-10-21

## 2020-10-21 RX ORDER — OXYCODONE HYDROCHLORIDE 5 MG/1
5 TABLET ORAL EVERY 6 HOURS PRN
Status: DISCONTINUED | OUTPATIENT
Start: 2020-10-21 | End: 2020-10-22 | Stop reason: HOSPADM

## 2020-10-21 RX ORDER — PROMETHAZINE HYDROCHLORIDE 12.5 MG/1
12.5 TABLET ORAL EVERY 6 HOURS PRN
Status: DISCONTINUED | OUTPATIENT
Start: 2020-10-21 | End: 2020-10-22 | Stop reason: HOSPADM

## 2020-10-21 RX ORDER — LEVOTHYROXINE SODIUM 0.03 MG/1
25 TABLET ORAL DAILY
Status: DISCONTINUED | OUTPATIENT
Start: 2020-10-21 | End: 2020-10-22 | Stop reason: HOSPADM

## 2020-10-21 RX ORDER — SODIUM CHLORIDE, SODIUM LACTATE, POTASSIUM CHLORIDE, CALCIUM CHLORIDE 600; 310; 30; 20 MG/100ML; MG/100ML; MG/100ML; MG/100ML
150 INJECTION, SOLUTION INTRAVENOUS CONTINUOUS
Status: DISCONTINUED | OUTPATIENT
Start: 2020-10-21 | End: 2020-10-22 | Stop reason: HOSPADM

## 2020-10-21 RX ORDER — PANTOPRAZOLE SODIUM 40 MG/10ML
40 INJECTION, POWDER, LYOPHILIZED, FOR SOLUTION INTRAVENOUS ONCE
Status: COMPLETED | OUTPATIENT
Start: 2020-10-21 | End: 2020-10-21

## 2020-10-21 RX ORDER — ATORVASTATIN CALCIUM 10 MG/1
10 TABLET, FILM COATED ORAL DAILY
Status: DISCONTINUED | OUTPATIENT
Start: 2020-10-21 | End: 2020-10-22 | Stop reason: HOSPADM

## 2020-10-21 RX ORDER — LISINOPRIL 10 MG/1
10 TABLET ORAL DAILY
Status: DISCONTINUED | OUTPATIENT
Start: 2020-10-21 | End: 2020-10-22 | Stop reason: HOSPADM

## 2020-10-21 RX ORDER — LORAZEPAM 2 MG/ML
0.5 INJECTION INTRAMUSCULAR EVERY 12 HOURS PRN
Status: DISCONTINUED | OUTPATIENT
Start: 2020-10-21 | End: 2020-10-22 | Stop reason: HOSPADM

## 2020-10-21 RX ORDER — MORPHINE SULFATE 4 MG/ML
4 INJECTION, SOLUTION INTRAMUSCULAR; INTRAVENOUS
Status: DISCONTINUED | OUTPATIENT
Start: 2020-10-21 | End: 2020-10-22 | Stop reason: HOSPADM

## 2020-10-21 RX ORDER — SODIUM CHLORIDE AND POTASSIUM CHLORIDE 150; 450 MG/100ML; MG/100ML
125 INJECTION, SOLUTION INTRAVENOUS CONTINUOUS
Status: DISCONTINUED | OUTPATIENT
Start: 2020-10-22 | End: 2020-10-22 | Stop reason: HOSPADM

## 2020-10-21 RX ORDER — LIDOCAINE HYDROCHLORIDE 10 MG/ML
0.5 INJECTION, SOLUTION EPIDURAL; INFILTRATION; INTRACAUDAL; PERINEURAL ONCE AS NEEDED
Status: DISCONTINUED | OUTPATIENT
Start: 2020-10-21 | End: 2020-10-21 | Stop reason: HOSPADM

## 2020-10-21 RX ORDER — SODIUM CHLORIDE, SODIUM LACTATE, POTASSIUM CHLORIDE, CALCIUM CHLORIDE 600; 310; 30; 20 MG/100ML; MG/100ML; MG/100ML; MG/100ML
9 INJECTION, SOLUTION INTRAVENOUS CONTINUOUS
Status: DISCONTINUED | OUTPATIENT
Start: 2020-10-21 | End: 2020-10-21 | Stop reason: HOSPADM

## 2020-10-21 RX ORDER — METOCLOPRAMIDE HYDROCHLORIDE 5 MG/ML
10 INJECTION INTRAMUSCULAR; INTRAVENOUS EVERY 6 HOURS PRN
Status: DISCONTINUED | OUTPATIENT
Start: 2020-10-21 | End: 2020-10-22 | Stop reason: HOSPADM

## 2020-10-21 RX ORDER — NEOSTIGMINE METHYLSULFATE 1 MG/ML
INJECTION, SOLUTION INTRAVENOUS AS NEEDED
Status: DISCONTINUED | OUTPATIENT
Start: 2020-10-21 | End: 2020-10-21 | Stop reason: SURG

## 2020-10-21 RX ORDER — FENTANYL CITRATE 50 UG/ML
INJECTION, SOLUTION INTRAMUSCULAR; INTRAVENOUS AS NEEDED
Status: DISCONTINUED | OUTPATIENT
Start: 2020-10-21 | End: 2020-10-21 | Stop reason: SURG

## 2020-10-21 RX ORDER — CHLORHEXIDINE GLUCONATE 0.12 MG/ML
30 RINSE ORAL
Status: DISCONTINUED | OUTPATIENT
Start: 2020-10-21 | End: 2020-10-21

## 2020-10-21 RX ORDER — ACETAMINOPHEN 500 MG
1000 TABLET ORAL EVERY 8 HOURS SCHEDULED
Status: DISCONTINUED | OUTPATIENT
Start: 2020-10-21 | End: 2020-10-22 | Stop reason: HOSPADM

## 2020-10-21 RX ORDER — GLYCOPYRROLATE 0.2 MG/ML
INJECTION INTRAMUSCULAR; INTRAVENOUS AS NEEDED
Status: DISCONTINUED | OUTPATIENT
Start: 2020-10-21 | End: 2020-10-21 | Stop reason: SURG

## 2020-10-21 RX ORDER — SODIUM CHLORIDE, SODIUM LACTATE, POTASSIUM CHLORIDE, CALCIUM CHLORIDE 600; 310; 30; 20 MG/100ML; MG/100ML; MG/100ML; MG/100ML
150 INJECTION, SOLUTION INTRAVENOUS CONTINUOUS
Status: DISCONTINUED | OUTPATIENT
Start: 2020-10-21 | End: 2020-10-21 | Stop reason: HOSPADM

## 2020-10-21 RX ORDER — ONDANSETRON 4 MG/1
4 TABLET, FILM COATED ORAL EVERY 6 HOURS PRN
Status: DISCONTINUED | OUTPATIENT
Start: 2020-10-22 | End: 2020-10-22 | Stop reason: HOSPADM

## 2020-10-21 RX ORDER — PANTOPRAZOLE SODIUM 40 MG/10ML
40 INJECTION, POWDER, LYOPHILIZED, FOR SOLUTION INTRAVENOUS
Status: DISCONTINUED | OUTPATIENT
Start: 2020-10-22 | End: 2020-10-22 | Stop reason: HOSPADM

## 2020-10-21 RX ORDER — GABAPENTIN 100 MG/1
100 CAPSULE ORAL 3 TIMES DAILY
Status: DISCONTINUED | OUTPATIENT
Start: 2020-10-21 | End: 2020-10-22 | Stop reason: HOSPADM

## 2020-10-21 RX ORDER — ONDANSETRON 2 MG/ML
4 INJECTION INTRAMUSCULAR; INTRAVENOUS EVERY 6 HOURS
Status: COMPLETED | OUTPATIENT
Start: 2020-10-21 | End: 2020-10-22

## 2020-10-21 RX ORDER — BUPRENORPHINE HYDROCHLORIDE 0.32 MG/ML
INJECTION INTRAMUSCULAR; INTRAVENOUS
Status: COMPLETED | OUTPATIENT
Start: 2020-10-21 | End: 2020-10-21

## 2020-10-21 RX ORDER — SIMETHICONE 80 MG
80 TABLET,CHEWABLE ORAL 4 TIMES DAILY PRN
Status: DISCONTINUED | OUTPATIENT
Start: 2020-10-21 | End: 2020-10-22 | Stop reason: HOSPADM

## 2020-10-21 RX ORDER — ACETAMINOPHEN 500 MG
1000 TABLET ORAL ONCE
Status: COMPLETED | OUTPATIENT
Start: 2020-10-21 | End: 2020-10-21

## 2020-10-21 RX ORDER — DIPHENHYDRAMINE HYDROCHLORIDE 50 MG/ML
25 INJECTION INTRAMUSCULAR; INTRAVENOUS EVERY 4 HOURS PRN
Status: DISCONTINUED | OUTPATIENT
Start: 2020-10-21 | End: 2020-10-22 | Stop reason: HOSPADM

## 2020-10-21 RX ORDER — LIDOCAINE HYDROCHLORIDE 10 MG/ML
INJECTION, SOLUTION EPIDURAL; INFILTRATION; INTRACAUDAL; PERINEURAL AS NEEDED
Status: DISCONTINUED | OUTPATIENT
Start: 2020-10-21 | End: 2020-10-21 | Stop reason: SURG

## 2020-10-21 RX ORDER — GABAPENTIN 300 MG/1
600 CAPSULE ORAL ONCE
Status: COMPLETED | OUTPATIENT
Start: 2020-10-21 | End: 2020-10-21

## 2020-10-21 RX ORDER — CETIRIZINE HYDROCHLORIDE 10 MG/1
10 TABLET ORAL DAILY
Status: DISCONTINUED | OUTPATIENT
Start: 2020-10-21 | End: 2020-10-22 | Stop reason: HOSPADM

## 2020-10-21 RX ORDER — HYDROMORPHONE HYDROCHLORIDE 2 MG/1
2 TABLET ORAL EVERY 4 HOURS PRN
Status: DISCONTINUED | OUTPATIENT
Start: 2020-10-21 | End: 2020-10-22 | Stop reason: HOSPADM

## 2020-10-21 RX ADMIN — FENTANYL CITRATE 100 MCG: 50 INJECTION, SOLUTION INTRAMUSCULAR; INTRAVENOUS at 08:58

## 2020-10-21 RX ADMIN — METOCLOPRAMIDE 10 MG: 5 INJECTION, SOLUTION INTRAMUSCULAR; INTRAVENOUS at 12:12

## 2020-10-21 RX ADMIN — LIDOCAINE HYDROCHLORIDE 50 MG: 10 INJECTION, SOLUTION EPIDURAL; INFILTRATION; INTRACAUDAL; PERINEURAL at 08:58

## 2020-10-21 RX ADMIN — CEFAZOLIN SODIUM 2 G: 2 INJECTION, SOLUTION INTRAVENOUS at 08:58

## 2020-10-21 RX ADMIN — BUPRENORPHINE HYDROCHLORIDE 0.3 MG: 0.32 INJECTION INTRAMUSCULAR; INTRAVENOUS at 09:20

## 2020-10-21 RX ADMIN — DEXAMETHASONE SODIUM PHOSPHATE 2 MG: 10 INJECTION INTRAMUSCULAR; INTRAVENOUS at 09:20

## 2020-10-21 RX ADMIN — CHLORCYCLIZINE HYDROCHLORIDE AND PSEUDOEPHEDRINE HYDROCHLORIDE 1 TABLET: 25; 60 TABLET ORAL at 23:11

## 2020-10-21 RX ADMIN — GABAPENTIN 100 MG: 100 CAPSULE ORAL at 23:09

## 2020-10-21 RX ADMIN — ACETAMINOPHEN 1000 MG: 500 TABLET ORAL at 08:08

## 2020-10-21 RX ADMIN — ACETAMINOPHEN 1000 MG: 500 TABLET, FILM COATED ORAL at 23:09

## 2020-10-21 RX ADMIN — ROCURONIUM BROMIDE 50 MG: 10 INJECTION INTRAVENOUS at 08:58

## 2020-10-21 RX ADMIN — ONDANSETRON HYDROCHLORIDE 4 MG: 2 SOLUTION INTRAMUSCULAR; INTRAVENOUS at 15:48

## 2020-10-21 RX ADMIN — GABAPENTIN 600 MG: 300 CAPSULE ORAL at 08:08

## 2020-10-21 RX ADMIN — GLYCOPYRROLATE 0.6 MG: 0.2 INJECTION INTRAMUSCULAR; INTRAVENOUS at 10:24

## 2020-10-21 RX ADMIN — CHLORHEXIDINE GLUCONATE 0.12% ORAL RINSE 30 ML: 1.2 LIQUID ORAL at 08:11

## 2020-10-21 RX ADMIN — NEOSTIGMINE 4 MG: 1 INJECTION INTRAVENOUS at 10:24

## 2020-10-21 RX ADMIN — SODIUM CHLORIDE, POTASSIUM CHLORIDE, SODIUM LACTATE AND CALCIUM CHLORIDE 1000 ML: 600; 310; 30; 20 INJECTION, SOLUTION INTRAVENOUS at 07:55

## 2020-10-21 RX ADMIN — HYDROMORPHONE HYDROCHLORIDE 1 MG: 1 INJECTION, SOLUTION INTRAMUSCULAR; INTRAVENOUS; SUBCUTANEOUS at 12:11

## 2020-10-21 RX ADMIN — SCOPALAMINE 1 PATCH: 1 PATCH, EXTENDED RELEASE TRANSDERMAL at 08:10

## 2020-10-21 RX ADMIN — PANTOPRAZOLE SODIUM 40 MG: 40 INJECTION, POWDER, FOR SOLUTION INTRAVENOUS at 08:08

## 2020-10-21 RX ADMIN — DEXAMETHASONE SODIUM PHOSPHATE 8 MG: 10 INJECTION INTRAMUSCULAR; INTRAVENOUS at 09:05

## 2020-10-21 RX ADMIN — CEFAZOLIN SODIUM 2 G: 2 INJECTION, SOLUTION INTRAVENOUS at 17:24

## 2020-10-21 RX ADMIN — ONDANSETRON HYDROCHLORIDE 4 MG: 2 SOLUTION INTRAMUSCULAR; INTRAVENOUS at 10:21

## 2020-10-21 RX ADMIN — ONDANSETRON HYDROCHLORIDE 4 MG: 2 SOLUTION INTRAMUSCULAR; INTRAVENOUS at 23:09

## 2020-10-21 RX ADMIN — SODIUM CHLORIDE, POTASSIUM CHLORIDE, SODIUM LACTATE AND CALCIUM CHLORIDE 150 ML/HR: 600; 310; 30; 20 INJECTION, SOLUTION INTRAVENOUS at 15:56

## 2020-10-21 RX ADMIN — PROPOFOL 200 MG: 10 INJECTION, EMULSION INTRAVENOUS at 08:58

## 2020-10-21 RX ADMIN — BUPIVACAINE HYDROCHLORIDE 60 ML: 2.5 INJECTION, SOLUTION EPIDURAL; INFILTRATION; INTRACAUDAL; PERINEURAL at 09:20

## 2020-10-21 NOTE — ANESTHESIA PREPROCEDURE EVALUATION
Anesthesia Evaluation     history of anesthetic complications: PONV               Airway   Mallampati: I  TM distance: >3 FB  Neck ROM: full  No difficulty expected  Dental - normal exam     Comment: CAPS/CROWNS    Pulmonary - normal exam   (+) shortness of breath,   Cardiovascular - normal exam    (+) hypertension, dysrhythmias, hyperlipidemia,       Neuro/Psych  (+) headaches, psychiatric history Anxiety,     GI/Hepatic/Renal/Endo    (+) morbid obesity,      Musculoskeletal     Abdominal  - normal exam    Bowel sounds: normal.   Substance History      OB/GYN          Other                      Anesthesia Plan    ASA 3     general   (TAPS FOR POSTOP PAIN)  intravenous induction     Anesthetic plan, all risks, benefits, and alternatives have been provided, discussed and informed consent has been obtained with: patient.    Plan discussed with CRNA.

## 2020-10-21 NOTE — PLAN OF CARE
Goal Outcome Evaluation:  Plan of Care Reviewed With: patient     Outcome Summary: Pt received from PACU s/p lap sleeve today, 5 lap sites to abd, CDI, pt has voided but has yet to ambulate further than the bathroom, still a bit drowsy. Pt has received PRN IV pain med x1 and PRN antiemetic x1, both providing relief. VSS.

## 2020-10-21 NOTE — ANESTHESIA PROCEDURE NOTES
Airway  Urgency: elective    Date/Time: 10/21/2020 8:59 AM  Airway not difficult    General Information and Staff    Patient location during procedure: OR  CRNA: Chuck Hamilton CRNA    Indications and Patient Condition  Indications for airway management: airway protection    Preoxygenated: yes  MILS not maintained throughout  Mask difficulty assessment: 1 - vent by mask    Final Airway Details  Final airway type: endotracheal airway      Successful airway: ETT  Cuffed: yes   Successful intubation technique: direct laryngoscopy  Facilitating devices/methods: intubating stylet  Endotracheal tube insertion site: oral  Blade: Jose  Blade size: 3  ETT size (mm): 7.5  Cormack-Lehane Classification: grade I - full view of glottis  Placement verified by: chest auscultation and capnometry   Measured from: lips  ETT/EBT  to lips (cm): 20  Number of attempts at approach: 1  Assessment: lips, teeth, and gum same as pre-op and atraumatic intubation    Additional Comments  Negative epigastric sounds, Breath sound equal bilaterally with symmetric chest rise and fall

## 2020-10-21 NOTE — BRIEF OP NOTE
GASTRIC SLEEVE LAPAROSCOPIC, ESOPHAGOGASTRODUODENOSCOPY  Progress Note    La Gambino  10/21/2020    Pre-op Diagnosis:   Morbid exogenous obesity (CMS/HCC) [E66.01]       Post-Op Diagnosis Codes:     * Morbid exogenous obesity (CMS/HCC) [E66.01]    Procedure/CPT® Codes:  KY LAP, MARIA ELENA RESTRICT PROC, LONGITUDINAL GASTRECTOMY [39634]  KY ESOPHAGOGASTRODUODENOSCOPY TRANSORAL DIAGNOSTIC [57348]      Procedure(s):  GASTRIC SLEEVE LAPAROSCOPIC  ESOPHAGOGASTRODUODENOSCOPY    Surgeon(s):  Rajinder Guzmán MD     Asst:  Olga Gross MD PGY-4    Anesthesia: General with Block    Staff:   Circulator: Yoon Devine RN; Caitlin Jesus RN  Scrub Person: Tesha Monique; Viktor Coffey         Estimated Blood Loss: minimal    Urine Voided: * No values recorded between 10/21/2020  8:54 AM and 10/21/2020 10:26 AM *    Specimens:                Specimens     ID Source Type Tests Collected By Collected At Frozen?      A Stomach Tissue · TISSUE PATHOLOGY EXAM   Rajinder Guzmán MD 10/21/20 0925 No     Description: SUB-TOTAL GASTRECTOMY    This specimen was not marked as sent.                Drains: * No LDAs found *    Findings:     Complications: none          Rajinder Guzmán MD     Date: 10/21/2020  Time: 10:26 EDT

## 2020-10-21 NOTE — INTERVAL H&P NOTE
H&P updated. The patient was examined and the following changes are noted:  Incidentally the patient denies any GB sx's.

## 2020-10-21 NOTE — PROGRESS NOTES
Discharge Planning Assessment  [facility]     Patient Name: La Gambino  MRN: [id number]  Today's Date: 10/21/2020    Admit Date: 10/21/2020    Discharge Needs Assessment     Row Name 10/21/20 1439       Living Environment    Lives With  spouse;child(stacie), dependent;parent(s) pt resides in [place] [facility] [place]    Name(s) of Who Lives With Patient   Christian, daughter and pt's mother    Unique Family Situation  pt is caregiver to her mother    Current Living Arrangements  home/apartment/condo    Primary Care Provided by  self    Provides Primary Care For  parent(s);child(stacie)    Family Caregiver if Needed  spouse    Family Caregiver Names  Christian    Quality of Family Relationships  helpful;involved;supportive    Able to Return to Prior Arrangements  yes       Resource/Environmental Concerns    Resource/Environmental Concerns  none       Transition Planning    Patient/Family Anticipates Transition to  home with family    Patient/Family Anticipated Services at Transition  none    Transportation Anticipated  family or friend will provide       Discharge Needs Assessment    Readmission Within the Last 30 Days  no previous admission in last 30 days    Equipment Currently Used at Home  none    Concerns to be Addressed  denies needs/concerns at this time;discharge planning    Anticipated Changes Related to Illness  none    Equipment Needed After Discharge  none    Provided Post Acute Provider List?  N/A    Provided Post Acute Provider Quality & Resource List?  N/A        Discharge Plan     Row Name 10/21/20 1440       Plan    Plan  home    Patient/Family in Agreement with Plan  yes    Plan Comments  CM spoke with pt at bedside. Pt resides in [place] [facility] [place] with her  Christian, 12 yo daughter and pt's mother.  Pt reports she is  independent of adls and primary caregiver to her mother. Pt denies use of DME and is not current with home health or outpatient services. Pt plans to return home and denies needs at this  time. CM will continue to follow.    Final Discharge Disposition Code  01 - home or self-care        Continued Care and Services - Admitted Since 10/21/2020    Coordination has not been started for this encounter.         Demographic Summary     Row Name 10/21/20 1435       General Information    Referral Source  admission list    Reason for Consult  discharge planning    Preferred Language  English    General Information Comments  PCP- Edilia Hernandez       Contact Information    Permission Granted to Share Info With          Functional Status     Row Name 10/21/20 1438       Functional Status    Usual Activity Tolerance  good    Current Activity Tolerance  moderate       Functional Status, IADL    Medications  independent    Meal Preparation  independent    Housekeeping  independent    Laundry  independent    Shopping  independent       Employment/    Employment Status  retired    Employment/ Comments  Pt confirms she has  insurance coverage, denies concerns or disruption in coverage. Pt has prescription drug coverage and denies issues obtaining or affording current medications.        Psychosocial    No documentation.       Abuse/Neglect    No documentation.       Legal    No documentation.       Substance Abuse    No documentation.       Patient Forms    No documentation.           Alina Reeder RN

## 2020-10-21 NOTE — OP NOTE
Preoperative Diagnosis:   Morbid Obesity with Multiple Co-Morbidities    Postoperative Diagnosis:   Same    Procedure:                                                      Laparoscopic Sleeve Gastrectomy (85% subtotal vertical gastrectomy)                                                                         EGD                                                                       Surgeon:                                                       CHUY Guzmán MD    Asst:                                                            Olga Azul MD PGY-4    Anesthesia:                                                   GETA    EBL:                                                              Minimal    Fluids:                                                           Crystalloid    Specimens:                                                   Subtotal gastrectomy    Drains:                                                           None    Counts:                                                          Correct    Complications:                                               None    Indications:   This is a 55 year-old morbidly obese female who presents for elective laparoscopic sleeve gastrectomy.  She's undergone our extensive preoperative education teaching and consent process everything's in order and she wishes to proceed.      Operative technique:     The patient was brought to the operating room, and placed supine upon the operating room table.  SCD hose were placed, she underwent uneventful general endotracheal anesthesia per the anesthesiology staff, she received IV Ancef and subcutaneous Lovenox, the anesthesiology staff performed a tap block, and her abdomen was prepped and draped with ChloraPrep in a sterile fashion prepping her insulin pumps in the lower abdomen out of the field, an Ioban was used as well, a Be catheter was not placed.    The peritoneal cavity was entered in the high in the left  midclavicular line using an 5 mm trocar and an Optiview technique and the abdomen was insufflated to a pressure of 15 mmHg with CO2 gas.  Exploratory laparoscopy revealed no evidence of injury from the entrance technique, a normal-appearing liver, a mild rectus diastases, a normal-appearing gallbladder.  Remaining trocars were placed under direct visualization including a 5 mm trocar in the left subcostal position, a 12 mm fascial splitting trocar to the right of the umbilicus and across from this to the left of midline a 15 mm trocar.  Through a stab incision in the epigastrium a Michelle retractor was used to elevate the left lobe of the liver exposing the hiatus.  There was no visible hiatal hernia from the anterior view and this was photodocumented.  Beginning approximately two thirds of the way around the greater curvature the stomach, the gastrocolic vessels were divided with the LigaSure device.  This proceeded proximally taking down all the short gastric vessels and exposing the left princess. There were no posterior hernias or lipomas and this was photodocumented.  Quite a bit of oozing noted from all surfaces throughout the case, no uncontrolled bleeding and overall minimal blood loss.  Gastrocolic vessels were then divided medially to a few cm proximal to the pylorus.  Fairly extensive filmy attachments of the posterior stomach to the pancreas and retroperitoneum were divided.  The previously placed orogastric tube was positioned into the distal antrum.  The stomach was marked with a Kitner saturated with a marking pen 1 cm lateral to the angle of His, 3 cm away from the angularis, and 6 cm from the pylorus.  The 22 cm bariatric standard clamp was then positioned just inside these markings. The 85% subtotal vertical sleeve gastrectomy was then performed using a Gastrofy articulating electronic linear stapler with dual absorbable strips.  The first firing was a 60 mm black load, the next 3 firings  were 60 mm purple loads.  The OG and then the Standard clamp were removed.  The sleeve was performed such that it was uniform in size, no hourglassing or narrowing, especially at the angularis, and the final firing was done a centimeter away from the angle of His to hopefully avoid incorporating esophageal fibers.  The subtotal gastrectomy specimen was placed into a large retrieval bag and withdrawn and placed on a separate Saleem stand, it was an average size specimen.  At this time a moderate sized superior pole splenic infarct noted and photodocumented.  The sleeve was submerged under saline.  Upper endoscopy was performed, and the endoscope was advanced into the duodenal bulb.  No air bubbles or leak seen, no bleeding at the staple line, no narrowing at the angularis, no pyloric spasm or deformity, edema and erythema of the gastric mucosa consistent with H. pylori gastritis changes (photodocumented), no hiatal hernia or Banks's esophagus, and the endoscope was withdrawn.  The subtotal gastrectomy specimen was inspected and sent to pathology for permanent section.  Irrigation fluid was suctioned free.  The sleeve was resting nicely and hemostatic.  The sleeve staple line was treated with 4 cc of aerosolized Tisseel fibrin glue.  Photodocumentation of the sleeve obtained.  Fascia at the 15 mm trocar site incision was closed with a horizontal mattress 0 Vicryl suture placed with a suture passer under direct visualization and tying the knot extracorporeally.  Remaining trocars were removed under direct visualization, no bleeding noted from their sites.  Subcutaneous tissue in the 15 mm incision was closed with an interrupted 2-0 Vicryl plus suture, and skin in each incision was closed using 3-0 Monocryl plus in an interrupted subcuticular stitch followed by skin-a-fix.  The patient tolerated the procedure well without complication, was taken to the recovery room in stable condition.

## 2020-10-21 NOTE — ANESTHESIA POSTPROCEDURE EVALUATION
Patient: La Hightower Immanuel    Procedure Summary     Date: 10/21/20 Room / Location:  [facility]  /  [facility]    Anesthesia Start: 0855 Anesthesia Stop:     Procedures:       GASTRIC SLEEVE LAPAROSCOPIC (N/A Abdomen)      ESOPHAGOGASTRODUODENOSCOPY (N/A Esophagus) Diagnosis:       Morbid exogenous obesity (CMS/HCC)      (Morbid exogenous obesity (CMS/HCC) [E66.01])    Surgeon: Rajinder Guzmán MD Provider: Yobany Gamino MD    Anesthesia Type: general ASA Status: 3          Anesthesia Type: general    Vitals  No vitals data found for the desired time range.          Post Anesthesia Care and Evaluation    Patient location during evaluation: PACU  Patient participation: complete - patient participated  Level of consciousness: awake and responsive to verbal stimuli  Pain score: 2  Pain management: adequate  Airway patency: patent  Anesthetic complications: No anesthetic complications    Cardiovascular status: acceptable  Respiratory status: acceptable  Hydration status: acceptable    Comments: Pt awake and responsive. SV. VSS. Report to RN. Patient Vitals in the past 24 hrs:  10/21/20 0803, BP:128/80, Temp:97.8 °F (36.6 °C), Temp src:Temporal, Pulse:78, Resp:18, SpO2:95 %  133/78. p 72. r 16. t 98.1

## 2020-10-21 NOTE — ANESTHESIA PROCEDURE NOTES
Tap block       Patient reassessed immediately prior to procedure    Patient location during procedure: OR  Reason for block: at surgeon's request and post-op pain management  Performed by  CRNA: Chuck Hamilton CRNA  Preanesthetic Checklist  Completed: patient identified, site marked, surgical consent, pre-op evaluation, timeout performed, IV checked, risks and benefits discussed and monitors and equipment checked  Prep:  Pt Position: supine  Sterile barriers:cap, gloves, sterile barriers and mask  Prep: ChloraPrep  Patient monitoring: blood pressure monitoring, continuous pulse oximetry and EKG  Procedure  Sedation:yes  Performed under: general  Guidance:ultrasound guided  Images:still images obtained, printed/placed on chart    Laterality:Bilateral  Block Type:TAP  Injection Technique:single-shot  Needle Type:short-bevel and echogenic  Needle Gauge:20 G  Resistance on Injection: none    Medications Used: bupivacaine PF (MARCAINE) 0.25 % injection, 60 mL  dexamethasone sodium phosphate injection, 2 mg  buprenorphine (BUPRENEX) injection, 0.3 mg      Medications  Comment:Block Injection:  LA dose divided between Right and Left block        Post Assessment  Injection Assessment: negative aspiration for heme, incremental injection and no paresthesia on injection  Patient Tolerance:comfortable throughout block  Complications:no  Additional Notes      Under Ultrasound guidance, a BBraun 4inch 360 degree needle was advanced with Normal Saline hydro dissection of tissue.  The Internal Oblique and Transversus Abdominus muscles where visualized.  At or before the aponeurosis of Internal Oblique, local anesthetic spread was visualized in the Transversus Abdominus Plane. Injection was made incrementally with aspiration every 5 mls.  There was no  intravascular injection,  injection pressure was normal, there was no neural injection, and the procedure was completed without difficulty.  Thank You.

## 2020-10-22 ENCOUNTER — APPOINTMENT (OUTPATIENT)
Dept: GENERAL RADIOLOGY | Facility: HOSPITAL | Age: 55
End: 2020-10-22

## 2020-10-22 VITALS
TEMPERATURE: 97.9 F | HEIGHT: 60 IN | BODY MASS INDEX: 38.95 KG/M2 | OXYGEN SATURATION: 91 % | HEART RATE: 79 BPM | DIASTOLIC BLOOD PRESSURE: 72 MMHG | SYSTOLIC BLOOD PRESSURE: 113 MMHG | WEIGHT: 198.41 LBS | RESPIRATION RATE: 18 BRPM

## 2020-10-22 LAB
ALBUMIN SERPL-MCNC: 3.7 G/DL (ref 3.5–5.2)
ALBUMIN/GLOB SERPL: 1.5 G/DL
ALP SERPL-CCNC: 63 U/L (ref 39–117)
ALT SERPL W P-5'-P-CCNC: 29 U/L (ref 1–33)
ANION GAP SERPL CALCULATED.3IONS-SCNC: 11 MMOL/L (ref 5–15)
AST SERPL-CCNC: 31 U/L (ref 1–32)
BASOPHILS # BLD AUTO: 0 10*3/MM3 (ref 0–0.2)
BASOPHILS NFR BLD AUTO: 0 % (ref 0–1.5)
BILIRUB SERPL-MCNC: 0.3 MG/DL (ref 0–1.2)
BUN SERPL-MCNC: 11 MG/DL (ref 6–20)
BUN/CREAT SERPL: 16.7 (ref 7–25)
CALCIUM SPEC-SCNC: 8.4 MG/DL (ref 8.6–10.5)
CHLORIDE SERPL-SCNC: 104 MMOL/L (ref 98–107)
CO2 SERPL-SCNC: 20 MMOL/L (ref 22–29)
CREAT SERPL-MCNC: 0.66 MG/DL (ref 0.57–1)
DEPRECATED RDW RBC AUTO: 44.1 FL (ref 37–54)
EOSINOPHIL # BLD AUTO: 0.16 10*3/MM3 (ref 0–0.4)
EOSINOPHIL NFR BLD AUTO: 1.8 % (ref 0.3–6.2)
ERYTHROCYTE [DISTWIDTH] IN BLOOD BY AUTOMATED COUNT: 12.5 % (ref 12.3–15.4)
GFR SERPL CREATININE-BSD FRML MDRD: 93 ML/MIN/1.73
GLOBULIN UR ELPH-MCNC: 2.4 GM/DL
GLUCOSE SERPL-MCNC: 134 MG/DL (ref 65–99)
HCT VFR BLD AUTO: 37.5 % (ref 34–46.6)
HGB BLD-MCNC: 12.5 G/DL (ref 12–15.9)
IMM GRANULOCYTES # BLD AUTO: 0.01 10*3/MM3 (ref 0–0.05)
IMM GRANULOCYTES NFR BLD AUTO: 0.1 % (ref 0–0.5)
IRON 24H UR-MRATE: 56 MCG/DL (ref 37–145)
LYMPHOCYTES # BLD AUTO: 0.92 10*3/MM3 (ref 0.7–3.1)
LYMPHOCYTES NFR BLD AUTO: 10.3 % (ref 19.6–45.3)
MCH RBC QN AUTO: 32.2 PG (ref 26.6–33)
MCHC RBC AUTO-ENTMCNC: 33.3 G/DL (ref 31.5–35.7)
MCV RBC AUTO: 96.6 FL (ref 79–97)
MONOCYTES # BLD AUTO: 0.66 10*3/MM3 (ref 0.1–0.9)
MONOCYTES NFR BLD AUTO: 7.4 % (ref 5–12)
NEUTROPHILS NFR BLD AUTO: 7.2 10*3/MM3 (ref 1.7–7)
NEUTROPHILS NFR BLD AUTO: 80.4 % (ref 42.7–76)
NRBC BLD AUTO-RTO: 0 /100 WBC (ref 0–0.2)
PLAT MORPH BLD: NORMAL
PLATELET # BLD AUTO: 243 10*3/MM3 (ref 140–450)
PMV BLD AUTO: 11.3 FL (ref 6–12)
POTASSIUM SERPL-SCNC: 4 MMOL/L (ref 3.5–5.2)
PROT SERPL-MCNC: 6.1 G/DL (ref 6–8.5)
RBC # BLD AUTO: 3.88 10*6/MM3 (ref 3.77–5.28)
RBC MORPH BLD: NORMAL
SODIUM SERPL-SCNC: 135 MMOL/L (ref 136–145)
WBC # BLD AUTO: 8.95 10*3/MM3 (ref 3.4–10.8)
WBC MORPH BLD: NORMAL

## 2020-10-22 PROCEDURE — 25010000002 THIAMINE PER 100 MG: Performed by: SURGERY

## 2020-10-22 PROCEDURE — 83540 ASSAY OF IRON: CPT | Performed by: SURGERY

## 2020-10-22 PROCEDURE — 25010000003 POTASSIUM CHLORIDE PER 2 MEQ: Performed by: SURGERY

## 2020-10-22 PROCEDURE — 25010000002 ONDANSETRON PER 1 MG: Performed by: SURGERY

## 2020-10-22 PROCEDURE — 25010000003 CEFAZOLIN IN DEXTROSE 2-4 GM/100ML-% SOLUTION: Performed by: SURGERY

## 2020-10-22 PROCEDURE — 85007 BL SMEAR W/DIFF WBC COUNT: CPT | Performed by: SURGERY

## 2020-10-22 PROCEDURE — 25010000002 ENOXAPARIN PER 10 MG: Performed by: SURGERY

## 2020-10-22 PROCEDURE — 80053 COMPREHEN METABOLIC PANEL: CPT | Performed by: SURGERY

## 2020-10-22 PROCEDURE — 85025 COMPLETE CBC W/AUTO DIFF WBC: CPT | Performed by: SURGERY

## 2020-10-22 PROCEDURE — 74240 X-RAY XM UPR GI TRC 1CNTRST: CPT

## 2020-10-22 PROCEDURE — 99024 POSTOP FOLLOW-UP VISIT: CPT | Performed by: SURGERY

## 2020-10-22 PROCEDURE — 25010000002 CYANOCOBALAMIN PER 1000 MCG: Performed by: SURGERY

## 2020-10-22 RX ORDER — ONDANSETRON 4 MG/1
4 TABLET, FILM COATED ORAL EVERY 4 HOURS PRN
Qty: 8 TABLET | Refills: 0 | Status: SHIPPED | OUTPATIENT
Start: 2020-10-22 | End: 2020-11-16

## 2020-10-22 RX ORDER — OXYCODONE HYDROCHLORIDE 5 MG/1
5 TABLET ORAL EVERY 4 HOURS PRN
Qty: 10 TABLET | Refills: 0 | Status: SHIPPED | OUTPATIENT
Start: 2020-10-22 | End: 2021-03-22

## 2020-10-22 RX ORDER — PROMETHAZINE HYDROCHLORIDE 12.5 MG/1
12.5 TABLET ORAL EVERY 4 HOURS PRN
Qty: 8 TABLET | Refills: 0 | Status: SHIPPED | OUTPATIENT
Start: 2020-10-22 | End: 2020-11-16

## 2020-10-22 RX ORDER — OMEPRAZOLE 40 MG/1
40 CAPSULE, DELAYED RELEASE ORAL DAILY
Qty: 60 CAPSULE | Refills: 1 | Status: SHIPPED | OUTPATIENT
Start: 2020-10-22 | End: 2020-10-23 | Stop reason: SDUPTHER

## 2020-10-22 RX ADMIN — ACETAMINOPHEN 1000 MG: 500 TABLET, FILM COATED ORAL at 06:57

## 2020-10-22 RX ADMIN — LEVOTHYROXINE SODIUM 25 MCG: 25 TABLET ORAL at 08:27

## 2020-10-22 RX ADMIN — POTASSIUM CHLORIDE AND SODIUM CHLORIDE 125 ML/HR: 450; 150 INJECTION, SOLUTION INTRAVENOUS at 13:19

## 2020-10-22 RX ADMIN — CYANOCOBALAMIN 1000 MCG: 1000 INJECTION, SOLUTION INTRAMUSCULAR; SUBCUTANEOUS at 08:29

## 2020-10-22 RX ADMIN — ALPRAZOLAM 0.25 MG: 0.25 TABLET ORAL at 08:27

## 2020-10-22 RX ADMIN — OXYBUTYNIN CHLORIDE 10 MG: 10 TABLET, EXTENDED RELEASE ORAL at 08:27

## 2020-10-22 RX ADMIN — FOLIC ACID 250 ML/HR: 5 INJECTION, SOLUTION INTRAMUSCULAR; INTRAVENOUS; SUBCUTANEOUS at 07:12

## 2020-10-22 RX ADMIN — ENOXAPARIN SODIUM 40 MG: 40 INJECTION SUBCUTANEOUS at 08:29

## 2020-10-22 RX ADMIN — CETIRIZINE HYDROCHLORIDE 10 MG: 10 TABLET, FILM COATED ORAL at 08:27

## 2020-10-22 RX ADMIN — PANTOPRAZOLE SODIUM 40 MG: 40 INJECTION, POWDER, FOR SOLUTION INTRAVENOUS at 06:57

## 2020-10-22 RX ADMIN — ONDANSETRON HYDROCHLORIDE 4 MG: 2 SOLUTION INTRAMUSCULAR; INTRAVENOUS at 02:33

## 2020-10-22 RX ADMIN — CEFAZOLIN SODIUM 2 G: 2 INJECTION, SOLUTION INTRAVENOUS at 02:23

## 2020-10-22 RX ADMIN — LISINOPRIL 10 MG: 10 TABLET ORAL at 08:27

## 2020-10-22 RX ADMIN — GABAPENTIN 100 MG: 100 CAPSULE ORAL at 08:28

## 2020-10-22 NOTE — PROGRESS NOTES
"Cc: POD#1 LSG  \"feel fine\"    She is alone in the room.  She looks and feels well and would like to go home if possible.  She is ambulating a lot and voiding well.  No pulmonary complaints.  No bowel movement or flatus.  Spirometer 1750.  She is tolerating her protein shake well.  No fever or tachycardia pulse 79 respirations 18 blood pressure 113/72   she is in no apparent distress.  Lungs clear to auscultation.  Heart regular rhythm.  Abdomen soft, nontender, nondistended, bowel sounds present.  Wounds look okay -some slight associated erythema with the incisions which looks allergic, not cellulitis.  The patient says they do not itch.  No warmth or tenderness.  CMP normal except glucose of 134 sodium 135 CO2 20 calcium 8.4 iron 56 white blood count 9 with 80 segs 10 lymphs 7 monocytes H&H 12.5 and 37.5 hemoglobin A1c normal 5.50 upper GI images reviewed and unremarkable no evidence of leak or obstruction good filling of the duodenum    Impression: Doing okay postoperative day #1 sleeve gastrectomy    Plan: Discharge home.  Discharge instructions discussed.  See orders  "

## 2020-10-22 NOTE — PLAN OF CARE
Goal Outcome Evaluation:  Plan of Care Reviewed With: patient   VSS. Pt voiding w/o difficulty. Using IS and flutter valve appropriately. Walked in araiza several laps. No c/o overt pain or nausea. Slept little during the night.

## 2020-10-22 NOTE — PAYOR COMM NOTE
"Ines Ahuja RN Utilization Review [phone]  Fax # [phone]  Ref # [id number]        La Gleason (55 y.o. Female)     Date of Birth Social Security Number Address Home Phone MRN    1965  [address]   [facility] [phone] [id number]    Faith Marital Status          [facility]        Admission Date Admission Type Admitting Provider Attending Provider Department, Room/Bed    10/21/20 Elective Rajinder Guzmán MD Weiss, George Derek, MD [facility] 2F, S211/1    Discharge Date Discharge Disposition Discharge Destination                       Attending Provider: Rajinder Guzmán MD    Allergies: No Known Allergies    Isolation: None   Infection: None   Code Status: CPR    Ht: 152.4 cm (60\")   Wt: 90 kg (198 lb 6.6 oz)    Admission Cmt: None   Principal Problem: Morbid exogenous obesity (CMS/[facility]) [E66.01]                 Active Insurance as of 10/21/2020     Primary Coverage     Payor Plan Insurance Group Employer/Plan Group    [facility]      Payor Plan Address Payor Plan Phone Number Payor Plan Fax Number Effective Dates    [address] [phone]  2020 - None Entered    [facility] [id number]       Subscriber Name Subscriber Birth Date Member ID       LA GLEASON 1965 [id number]                 Emergency Contacts      (Rel.) Home Phone Work Phone Mobile Phone    Christian Gleason (Spouse) -- -- [phone]               History & Physical      Rajinder Guzmán MD at 10/21/20 0825          H&P updated. The patient was examined and the following changes are noted:  Incidentally the patient denies any GB sx's.          Electronically signed by Rajinder Guzmán MD at 10/21/20 0849   Source Note          [facility] BARIATRIC SURGERY  [address]  [address]  [facility] [id number] [phone]      Patient  Name:  La Gleason  :  1965      Date of " Visit: 10/20/2020    Chief Complaint:  weight gain; unable to maintain weight loss.   Evaluate for possible metabolic and bariatric surgery    History of Present Illness:  La Gambino is a 55 y.o. female who presents today for evaluation, education and consultation regarding metabolic and bariatric surgery (MBS).  Since last seen 9/28/2020 she has gained 3 pounds.  The patient returns for final visit prior to metabolic and bariatric surgery specifically the sleeve gastrectomy.  Original intake evaluation Bibi Sutton PA-C dated 9/28/2020 reviewed.      The patient has had issues with morbid obesity for years and only temporary success with non-surgical methods of weight loss.  The patient is seeking LSG to help with the morbid obesity related conditions of anxiety, dyspnea exertion, fatigue, folate deficiency, frequent headaches, H. pylori gastritis, hyperlipidemia, hypertension, hypothyroidism, joint pain, overactive bladder, rosacea, vitamin D deficiency, incomplete right bundle branch block, irritable bowel syndrome, elevated transaminases.    55-year-old morbidly obese white female from [facility].  She is actually on the surgery schedule for tomorrow.  She is very organized and has a large binder and several written questions which were addressed.  She says she has been essentially asymptomatic with her H. pylori and has noticed no difference with her treatments.  She did retest positive and has taken a second round of treatment and she is encouraged to follow-up and make sure testing is ultimately negative.  We discussed that if she is still positive she may want to seek GI referral and voiced understanding that there is an increased risk of malignancy with untreated H. pylori gastritis.  Her relatives live next-door on both sides of her house and are smokers and she voiced clear understanding about tobacco, nicotine, and secondhand smoke policy and says she will comply.            Past Medical  "History:   Diagnosis Date   • Anxiety 2018    better now, no meds   • Dyspepsia    • Dyspnea on exertion    • Elevated transaminase level    • Fatigue    • Folate deficiency    • Frequent headaches     \"extremely tight neck muscles\"   • H. pylori infection     UBT/EGD bx (+) - PrevPak RX 20, repeat UBT (+) - Pylera RX 20   • Hair thinning     on biotin   • Hyperlipidemia    • Hypertension 2012   • Hypothyroidism    • IBS (irritable bowel syndrome)    • Incomplete right bundle branch block    • Joint pain    • Morbid obesity (CMS/HCC)    • OAB (overactive bladder)     w/ urgency and stress incontinence   • PONV (postoperative nausea and vomiting)    • Rosacea    • Seasonal allergies    • Vitamin D deficiency    • Wears glasses      Past Surgical History:   Procedure Laterality Date   • CERVICAL CONIZATION, LEEP     •  SECTION     • COLONOSCOPY N/A 4/3/2017    Procedure: COLONOSCOPY ;  Surgeon: Prema Phillip MD;  Location: [facility] ENDOSCOPY;  Service:    • CYST REMOVAL     • DILATATION AND CURETTAGE     • ENDOMETRIAL ABLATION     • LASIK     • MASTOPEXY Bilateral    • NASAL SEPTUM SURGERY     • SOFT TISSUE CYST EXCISION      from (L) side   • TOTAL LAPAROSCOPIC HYSTERECTOMY      benign dz, w/ bladder tuck   • WISDOM TOOTH EXTRACTION         No Known Allergies    Current Outpatient Medications:   •  APPLE CIDER VINEGAR PO, Take 2 tablets by mouth 2 (two) times a day., Disp: , Rfl:   •  atorvastatin (LIPITOR) 10 MG tablet, Take 10 mg by mouth Daily., Disp: , Rfl:   •  Biotin 1000 MCG tablet, Take 1,000 mcg by mouth Daily., Disp: , Rfl:   •  cetirizine (zyrTEC) 10 MG tablet, Take 10 mg by mouth Daily., Disp: , Rfl:   •  Chlorcyclizine-Pseudoephed (Stahist AD) 25-60 MG tablet, Take 1 tablet by mouth 2 (Two) Times a Day., Disp: , Rfl:   •  Cholecalciferol (VITAMIN D3) 50 MCG (2000) tablet, Take 50 Units by mouth Daily., Disp: , Rfl:   •  " levothyroxine (SYNTHROID, LEVOTHROID) 25 MCG tablet, Take 25 mcg by mouth Daily., Disp: , Rfl:   •  lisinopril (PRINIVIL,ZESTRIL) 10 MG tablet, Take 10 mg by mouth Daily., Disp: , Rfl:   •  loratadine (CLARITIN) 10 MG tablet, Take 10 mg by mouth Daily., Disp: , Rfl:   •  Multiple Vitamins-Minerals (MULTIVITAMIN ADULT PO), Take  by mouth., Disp: , Rfl:   •  oxybutynin XL (DITROPAN XL) 10 MG 24 hr tablet, Take 10 mg by mouth Daily., Disp: , Rfl:   •  vitamin E 400 UNIT capsule, Take 400 Units by mouth Daily., Disp: , Rfl:   •  B Complex Vitamins (VITAMIN B COMPLEX PO), Take 1 tablet by mouth Daily., Disp: , Rfl:   •  vitamin C (ASCORBIC ACID) 500 MG tablet, Take 500 mg by mouth Daily., Disp: , Rfl:     Social History     Socioeconomic History   • Marital status:      Spouse name: Not on file   • Number of children: Not on file   • Years of education: Not on file   • Highest education level: Not on file   Tobacco Use   • Smoking status: Never Smoker   • Smokeless tobacco: Never Used   Substance and Sexual Activity   • Alcohol use: No   • Drug use: No   • Sexual activity: Yes     Partners: Male     Birth control/protection: Post-menopausal     Comment: Had a hysterectomy....   Social History Narrative    Lives in [place], [place] w/  Christian Gambino, daughter (14yo), and her mother.  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      Family History   Problem Relation Age of Onset   • Arthritis Mother    • Diabetes Mother    • Kidney cancer Mother 69   • Sleep apnea Mother    • Hypertension Mother    • Depression Mother    • Kidney disease Mother    • Miscarriages / Stillbirths Mother    • Lung cancer Father 70   • Heart attack Father    • Stroke Father    • Heart disease Father    • Hypertension Father    • Alcohol abuse Father    • Arthritis Father    • Lung cancer Paternal Grandmother    • Heart attack Paternal Grandfather    • Hypertension Paternal Grandfather    • Hypertension Maternal  Grandmother    • Arthritis Maternal Grandmother    • Miscarriages / Stillbirths Maternal Grandmother    • Heart disease Maternal Grandfather    • Heart attack Maternal Grandfather 42   • Hypertension Maternal Grandfather    • Early death Maternal Grandfather    • Arthritis Brother    • Arthritis Paternal Uncle    • Osteosarcoma Brother 16   • Lung cancer Paternal Aunt    • Cancer Paternal Aunt    • Diabetes Maternal Aunt    • Diabetes Maternal Aunt    • Diabetes Maternal Uncle    • Heart disease Maternal Uncle    • Hypertension Maternal Uncle    • Diabetes Maternal Uncle    • Hypertension Maternal Uncle        Review of Systems   Constitutional: Positive for fatigue and unexpected weight gain. Negative for chills, diaphoresis, fever and unexpected weight loss.   HENT: Negative for congestion and facial swelling.    Eyes: Negative for blurred vision, double vision and discharge.   Respiratory: Negative for chest tightness, shortness of breath and stridor.    Cardiovascular: Negative for chest pain, palpitations and leg swelling.   Gastrointestinal: Negative for blood in stool.   Endocrine: Negative for polydipsia.   Genitourinary: Negative for hematuria.   Musculoskeletal: Positive for arthralgias.   Skin: Negative for color change.   Allergic/Immunologic: Negative for immunocompromised state.   Neurological: Negative for confusion.   Psychiatric/Behavioral: Negative for self-injury.       I have reviewed the ROS and confirm that it's accurate today.    Physical Exam:  Vital Signs:  Weight: 90 kg (198 lb 8 oz)   Body mass index is 38.77 kg/m².  Temp: 97.8 °F (36.6 °C)   Heart Rate: 72   BP: 110/66     Physical Exam  Vitals signs reviewed.   Constitutional:       Appearance: She is well-developed.   HENT:      Head: Normocephalic and atraumatic.      Nose:      Comments: mask  Eyes:      Conjunctiva/sclera: Conjunctivae normal.      Pupils: Pupils are equal, round, and reactive to light.   Neck:      Musculoskeletal:  Normal range of motion and neck supple.      Thyroid: No thyromegaly.      Vascular: No carotid bruit.      Trachea: No tracheal deviation.   Cardiovascular:      Rate and Rhythm: Normal rate and regular rhythm.      Heart sounds: Normal heart sounds.   Pulmonary:      Effort: Pulmonary effort is normal. No respiratory distress.      Breath sounds: Normal breath sounds.   Abdominal:      General: There is no distension.      Palpations: Abdomen is soft.      Tenderness: There is no abdominal tenderness.      Comments: Laparoscopy scars and low transverse scar   Musculoskeletal: Normal range of motion.         General: No deformity.   Skin:     General: Skin is warm and dry.      Findings: No rash.   Neurological:      Mental Status: She is alert and oriented to person, place, and time.      Cranial Nerves: No cranial nerve deficit.      Coordination: Coordination normal.   Psychiatric:         Behavior: Behavior normal.         Thought Content: Thought content normal.         Judgment: Judgment normal.         Patient Active Problem List   Diagnosis   • Seasonal allergies   • PONV (postoperative nausea and vomiting)   • OAB (overactive bladder)   • Hypothyroidism   • Hypertension   • Hyperlipidemia   • Anxiety   • Morbid obesity (CMS/HCC)   • Fatigue   • Dyspepsia   • Dyspnea on exertion   • Folate deficiency   • Vitamin D deficiency   • Rosacea   • Hair thinning   • Joint pain   • Frequent headaches   • Morbid exogenous obesity (CMS/HCC)       Assessment:    La Gambino is a 55 y.o. year old female with medically complicated obesity.    Metabolic and bariatric surgery is deemed medically necessary given the following obesity related comorbidities including anxiety, dyspnea exertion, fatigue, folate deficiency, frequent headaches, H. pylori gastritis, hyperlipidemia, hypertension, hypothyroidism, joint pain, overactive bladder, rosacea, vitamin D deficiency, incomplete right bundle branch block, irritable  bowel syndrome, elevated transaminases with current Weight: 90 kg (198 lb 8 oz) and Body mass index is 38.77 kg/m²..      Patient is aware that surgery is a tool, and that weight loss and improvement in comorbidities is not guaranteed but only seen in the context of appropriate use, follow up and physical activity.    The patient was present for an approximately a 2.5 hour discussion of the purpose of MBS, how MBS is a tool to assist in achieving weight loss goals, the most common complications and how best to avoid them, and the strategies for short and long term weight loss and improvement in comorbidities.  Ample opportunity to discuss questions was available both in group and during the time of individual examination.    I reviewed her CBC and CMP dated 10/19/2020 which are unremarkable except for CO2 of 20.5 ALT of 39 AST of 49.  Chest x-ray dated 10/14/2020 no active process.  EKG dated 10/15/2020 showing normal sinus rhythm with incomplete right bundle branch block and nonspecific T wave abnormality prolonged QT when compared with ECG of April 14, 2015 incomplete right bundle branch block is now present.  Psychosocial evaluation dated 5/20/2020 and 6/3/2020.  Elias PhD appropriate candidate.  Dietitian evaluation dated 9/28/2020 Stephenie Adan RD, EGD dated 7/20/2020 showing diffuse H. pylori gastritis more pronounced in the fundus no hiatal hernia Z-line 40 cm.  I noted at that time that one of her friends that had sleeve gastrectomy performed and that the patient had some dyspeptic symptoms with Italian food but it was otherwise asymptomatic.  Antral biopsies showed chronic active gastritis with H. pylori and gastric fundus biopsy showed focally active chronic gastritis H. pylori present.  Distal esophageal biopsies showed mild reactive chronic changes compatible with reflux.  H. pylori breath test was positive dated 9/24/2020.  Labs dated 6/16/2020 showed a normal TSH normal CMP triglycerides 299 VLDL  "elevated at 59.8 normal CBC cardiology clearance at low risk dated 8/18/2020 and Marco Antonio Baeza MD.  Please see scanned records that I have reviewed and signed off on today.  All of this in addition to the patient's unique history and exam has been taken into consideration in determining their appropriate candidacy for MBS.    Complications  of laparoscopic/possible robotic gastric sleeve were discussed. The patient is well aware of the potential complications of surgery that include but not limited to bleeding, infections, deep venous thrombosis, pulmonary embolism, pulmonary complications such as pneumonia, cardiac events, hernias, small bowel obstruction, damage to the spleen or other organs, bowel injury, disfiguring scars, failure to lose weight, need for additional surgery, conversion to an open procedure, and death. Patient is also aware of complications which apply in this particular procedure that can include but are not limited to a \"leak\" at the staple line which in some instances may require conversion to gastric bypass.    The patient is aware if a hiatal hernia is encountered, it likely will be repaired.  R/B/A Rx to hiatal hernia repair were discussed as outlined in our long consent form.  Briefly risks in addition to those for LSG include recurrent hernia, CEASAR, dysphagia, esophageal injury, pneumothorax, injury to the vagus nerves, injury to the thoracic duct, aorta or vena cava.    I discussed avoiding all tobacco products and second hand smoke at least 2 weeks pre-operatively and 6 weeks post-operatively to minimize the risk of sleeve leak.  This included discussing the importance of avoiding even secondhand smoke as the risk of leak is increased.  Examples discussed:  I made it very clear that the patient understands they should avoid even riding in a car where someone has previously smoked in the last 2 weeks, living in a house where someone smokes (even if it's in a separate room/patio/attached " garage, etc.) we discussed that they should not have a conversation with a group of people who are smoking even if it's outside.  They can be around wood burning fires and barbecue.  I told them I do not know if marijuana has a same effects but my overall recommendation is to avoid it for 2 weeks prior in 6 weeks after surgery.  They also are aware that nicotine may also increase the risk of leak and I strongly encouraged him to avoid that as well for 2 weeks prior in 6 weeks after surgery.    Discussed the risks, benefits and alternative therapies at great length as outlined in our extensive consent forms, consent videos, and educational teaching process under the direction of the center's .    A copy of the patient's signed informed consent is on file.    R/B/A Rx discussed to postop anticoagulation incl but not limited to bleeding, drug reaction, venothromboembolic events, etc. and the patient declined.        Plan: After evaluation today I think the patient is a reasonable candidate for laparoscopic sleeve gastrectomy.  Other issues include anxiety, dyspnea exertion, fatigue, folate deficiency, frequent headaches, H. pylori gastritis, hyperlipidemia, hypertension, hypothyroidism, joint pain, overactive bladder, rosacea, vitamin D deficiency, incomplete right bundle branch block, irritable bowel syndrome, elevated transaminases, PONV.        Rajinder Guzmán MD              Answers for HPI/ROS submitted by the patient on 10/18/2020   What is the primary reason for your visit?: Other  Please describe your symptoms.: Symptom:  being obese.    This is an appt for me to meet one-on-one with Dr. Guzmán to get my final approval for surgery.  Have you had these symptoms before?: Yes  How long have you been having these symptoms?: Greater than 2 weeks  Please list any medications you are currently taking for this condition.: Stahist-AD:  2/day, Elderberry Gummies -100 m/day, Cetirizine-10 mg:   1/day, Oxybutynin-10m/day, Apple Cider Vinegar:   4/day , , Vitamin D-5000 IU:  1/day, Vutamin E-400 IU:  1/day , Centrum Silver multi-vitamin:  1/day, Lisinopril-10 m/day, Biotin -1000 mc/day, Lipitor-10 m/day, Probiotics-2/day , , Plus:  I have purchased the vitamins you want me to take post-surgery....  Please describe any probable cause for these symptoms. : I am overweight and have struggled with my weight my entire life.  I have tried many different diets and exercise programs.   Could it possibly be genetic - or at least partly genetic?   Everyone in my family is overweight.      Electronically signed by Rajinder Guzmán MD at 10/20/20 9166             Rajinder Guzmán MD at 10/20/20 1034          [facility] BARIATRIC SURGERY  [address]  [facility] [address]  [facility] [id number] [phone]      Patient  Name:  La Gambino  :  1965      Date of Visit: 10/20/2020    Chief Complaint:  weight gain; unable to maintain weight loss.   Evaluate for possible metabolic and bariatric surgery    History of Present Illness:  La Gambino is a 55 y.o. female who presents today for evaluation, education and consultation regarding metabolic and bariatric surgery (MBS).  Since last seen 2020 she has gained 3 pounds.  The patient returns for final visit prior to metabolic and bariatric surgery specifically the sleeve gastrectomy.  Original intake evaluation Bibi Sutton PA-C dated 2020 reviewed.      The patient has had issues with morbid obesity for years and only temporary success with non-surgical methods of weight loss.  The patient is seeking LSG to help with the morbid obesity related conditions of anxiety, dyspnea exertion, fatigue, folate deficiency, frequent headaches, H. pylori gastritis, hyperlipidemia, hypertension, hypothyroidism, joint pain, overactive bladder, rosacea, vitamin D deficiency, incomplete right bundle branch  "block, irritable bowel syndrome, elevated transaminases.    55-year-old morbidly obese white female from [facility].  She is actually on the surgery schedule for tomorrow.  She is very organized and has a large binder and several written questions which were addressed.  She says she has been essentially asymptomatic with her H. pylori and has noticed no difference with her treatments.  She did retest positive and has taken a second round of treatment and she is encouraged to follow-up and make sure testing is ultimately negative.  We discussed that if she is still positive she may want to seek GI referral and voiced understanding that there is an increased risk of malignancy with untreated H. pylori gastritis.  Her relatives live next-door on both sides of her house and are smokers and she voiced clear understanding about tobacco, nicotine, and secondhand smoke policy and says she will comply.            Past Medical History:   Diagnosis Date   • Anxiety 2018    better now, no meds   • Dyspepsia    • Dyspnea on exertion    • Elevated transaminase level    • Fatigue    • Folate deficiency    • Frequent headaches     \"extremely tight neck muscles\"   • H. pylori infection     UBT/EGD bx (+) - PrevPak RX 20, repeat UBT (+) - Pylera RX 20   • Hair thinning     on biotin   • Hyperlipidemia    • Hypertension 2012   • Hypothyroidism    • IBS (irritable bowel syndrome)    • Incomplete right bundle branch block    • Joint pain    • Morbid obesity (CMS/HCC)    • OAB (overactive bladder)     w/ urgency and stress incontinence   • PONV (postoperative nausea and vomiting)    • Rosacea    • Seasonal allergies    • Vitamin D deficiency    • Wears glasses      Past Surgical History:   Procedure Laterality Date   • CERVICAL CONIZATION, LEEP     •  SECTION     • COLONOSCOPY N/A 4/3/2017    Procedure: COLONOSCOPY ;  Surgeon: Prema Phillip MD;  Location: [facility] ENDOSCOPY;  Service:  "   • CYST REMOVAL     • DILATATION AND CURETTAGE  1996   • ENDOMETRIAL ABLATION  2009   • LASIK  2018   • MASTOPEXY Bilateral 2003   • NASAL SEPTUM SURGERY  2005   • SOFT TISSUE CYST EXCISION  2012    from (L) side   • TOTAL LAPAROSCOPIC HYSTERECTOMY  2010    benign dz, w/ bladder tuck   • WISDOM TOOTH EXTRACTION  1983       No Known Allergies    Current Outpatient Medications:   •  APPLE CIDER VINEGAR PO, Take 2 tablets by mouth 2 (two) times a day., Disp: , Rfl:   •  atorvastatin (LIPITOR) 10 MG tablet, Take 10 mg by mouth Daily., Disp: , Rfl:   •  Biotin 1000 MCG tablet, Take 1,000 mcg by mouth Daily., Disp: , Rfl:   •  cetirizine (zyrTEC) 10 MG tablet, Take 10 mg by mouth Daily., Disp: , Rfl:   •  Chlorcyclizine-Pseudoephed (Stahist AD) 25-60 MG tablet, Take 1 tablet by mouth 2 (Two) Times a Day., Disp: , Rfl:   •  Cholecalciferol (VITAMIN D3) 50 MCG (2000 UT) tablet, Take 50 Units by mouth Daily., Disp: , Rfl:   •  levothyroxine (SYNTHROID, LEVOTHROID) 25 MCG tablet, Take 25 mcg by mouth Daily., Disp: , Rfl:   •  lisinopril (PRINIVIL,ZESTRIL) 10 MG tablet, Take 10 mg by mouth Daily., Disp: , Rfl:   •  loratadine (CLARITIN) 10 MG tablet, Take 10 mg by mouth Daily., Disp: , Rfl:   •  Multiple Vitamins-Minerals (MULTIVITAMIN ADULT PO), Take  by mouth., Disp: , Rfl:   •  oxybutynin XL (DITROPAN XL) 10 MG 24 hr tablet, Take 10 mg by mouth Daily., Disp: , Rfl:   •  vitamin E 400 UNIT capsule, Take 400 Units by mouth Daily., Disp: , Rfl:   •  B Complex Vitamins (VITAMIN B COMPLEX PO), Take 1 tablet by mouth Daily., Disp: , Rfl:   •  vitamin C (ASCORBIC ACID) 500 MG tablet, Take 500 mg by mouth Daily., Disp: , Rfl:     Social History     Socioeconomic History   • Marital status:      Spouse name: Not on file   • Number of children: Not on file   • Years of education: Not on file   • Highest education level: Not on file   Tobacco Use   • Smoking status: Never Smoker   • Smokeless tobacco: Never Used   Substance  and Sexual Activity   • Alcohol use: No   • Drug use: No   • Sexual activity: Yes     Partners: Male     Birth control/protection: Post-menopausal     Comment: Had a hysterectomy....   Social History Narrative    Lives in [place], [place] w/  Christian Gambino, daughter (12yo), and her mother.  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      Family History   Problem Relation Age of Onset   • Arthritis Mother    • Diabetes Mother    • Kidney cancer Mother 69   • Sleep apnea Mother    • Hypertension Mother    • Depression Mother    • Kidney disease Mother    • Miscarriages / Stillbirths Mother    • Lung cancer Father 70   • Heart attack Father    • Stroke Father    • Heart disease Father    • Hypertension Father    • Alcohol abuse Father    • Arthritis Father    • Lung cancer Paternal Grandmother    • Heart attack Paternal Grandfather    • Hypertension Paternal Grandfather    • Hypertension Maternal Grandmother    • Arthritis Maternal Grandmother    • Miscarriages / Stillbirths Maternal Grandmother    • Heart disease Maternal Grandfather    • Heart attack Maternal Grandfather 42   • Hypertension Maternal Grandfather    • Early death Maternal Grandfather    • Arthritis Brother    • Arthritis Paternal Uncle    • Osteosarcoma Brother 16   • Lung cancer Paternal Aunt    • Cancer Paternal Aunt    • Diabetes Maternal Aunt    • Diabetes Maternal Aunt    • Diabetes Maternal Uncle    • Heart disease Maternal Uncle    • Hypertension Maternal Uncle    • Diabetes Maternal Uncle    • Hypertension Maternal Uncle        Review of Systems   Constitutional: Positive for fatigue and unexpected weight gain. Negative for chills, diaphoresis, fever and unexpected weight loss.   HENT: Negative for congestion and facial swelling.    Eyes: Negative for blurred vision, double vision and discharge.   Respiratory: Negative for chest tightness, shortness of breath and stridor.    Cardiovascular: Negative for chest pain,  palpitations and leg swelling.   Gastrointestinal: Negative for blood in stool.   Endocrine: Negative for polydipsia.   Genitourinary: Negative for hematuria.   Musculoskeletal: Positive for arthralgias.   Skin: Negative for color change.   Allergic/Immunologic: Negative for immunocompromised state.   Neurological: Negative for confusion.   Psychiatric/Behavioral: Negative for self-injury.       I have reviewed the ROS and confirm that it's accurate today.    Physical Exam:  Vital Signs:  Weight: 90 kg (198 lb 8 oz)   Body mass index is 38.77 kg/m².  Temp: 97.8 °F (36.6 °C)   Heart Rate: 72   BP: 110/66     Physical Exam  Vitals signs reviewed.   Constitutional:       Appearance: She is well-developed.   HENT:      Head: Normocephalic and atraumatic.      Nose:      Comments: mask  Eyes:      Conjunctiva/sclera: Conjunctivae normal.      Pupils: Pupils are equal, round, and reactive to light.   Neck:      Musculoskeletal: Normal range of motion and neck supple.      Thyroid: No thyromegaly.      Vascular: No carotid bruit.      Trachea: No tracheal deviation.   Cardiovascular:      Rate and Rhythm: Normal rate and regular rhythm.      Heart sounds: Normal heart sounds.   Pulmonary:      Effort: Pulmonary effort is normal. No respiratory distress.      Breath sounds: Normal breath sounds.   Abdominal:      General: There is no distension.      Palpations: Abdomen is soft.      Tenderness: There is no abdominal tenderness.      Comments: Laparoscopy scars and low transverse scar   Musculoskeletal: Normal range of motion.         General: No deformity.   Skin:     General: Skin is warm and dry.      Findings: No rash.   Neurological:      Mental Status: She is alert and oriented to person, place, and time.      Cranial Nerves: No cranial nerve deficit.      Coordination: Coordination normal.   Psychiatric:         Behavior: Behavior normal.         Thought Content: Thought content normal.         Judgment: Judgment  normal.         Patient Active Problem List   Diagnosis   • Seasonal allergies   • PONV (postoperative nausea and vomiting)   • OAB (overactive bladder)   • Hypothyroidism   • Hypertension   • Hyperlipidemia   • Anxiety   • Morbid obesity (CMS/HCC)   • Fatigue   • Dyspepsia   • Dyspnea on exertion   • Folate deficiency   • Vitamin D deficiency   • Rosacea   • Hair thinning   • Joint pain   • Frequent headaches   • Morbid exogenous obesity (CMS/HCC)       Assessment:    La Gambino is a 55 y.o. year old female with medically complicated obesity.    Metabolic and bariatric surgery is deemed medically necessary given the following obesity related comorbidities including anxiety, dyspnea exertion, fatigue, folate deficiency, frequent headaches, H. pylori gastritis, hyperlipidemia, hypertension, hypothyroidism, joint pain, overactive bladder, rosacea, vitamin D deficiency, incomplete right bundle branch block, irritable bowel syndrome, elevated transaminases with current Weight: 90 kg (198 lb 8 oz) and Body mass index is 38.77 kg/m²..      Patient is aware that surgery is a tool, and that weight loss and improvement in comorbidities is not guaranteed but only seen in the context of appropriate use, follow up and physical activity.    The patient was present for an approximately a 2.5 hour discussion of the purpose of MBS, how MBS is a tool to assist in achieving weight loss goals, the most common complications and how best to avoid them, and the strategies for short and long term weight loss and improvement in comorbidities.  Ample opportunity to discuss questions was available both in group and during the time of individual examination.    I reviewed her CBC and CMP dated 10/19/2020 which are unremarkable except for CO2 of 20.5 ALT of 39 AST of 49.  Chest x-ray dated 10/14/2020 no active process.  EKG dated 10/15/2020 showing normal sinus rhythm with incomplete right bundle branch block and nonspecific T wave  "abnormality prolonged QT when compared with ECG of April 14, 2015 incomplete right bundle branch block is now present.  Psychosocial evaluation dated 5/20/2020 and 6/3/2020.  Elias PhD appropriate candidate.  Dietitian evaluation dated 9/28/2020 Stephenie Adan RD, EGD dated 7/20/2020 showing diffuse H. pylori gastritis more pronounced in the fundus no hiatal hernia Z-line 40 cm.  I noted at that time that one of her friends that had sleeve gastrectomy performed and that the patient had some dyspeptic symptoms with Italian food but it was otherwise asymptomatic.  Antral biopsies showed chronic active gastritis with H. pylori and gastric fundus biopsy showed focally active chronic gastritis H. pylori present.  Distal esophageal biopsies showed mild reactive chronic changes compatible with reflux.  H. pylori breath test was positive dated 9/24/2020.  Labs dated 6/16/2020 showed a normal TSH normal CMP triglycerides 299 VLDL elevated at 59.8 normal CBC cardiology clearance at low risk dated 8/18/2020 and Marco Antonio Baeza MD.  Please see scanned records that I have reviewed and signed off on today.  All of this in addition to the patient's unique history and exam has been taken into consideration in determining their appropriate candidacy for MBS.    Complications  of laparoscopic/possible robotic gastric sleeve were discussed. The patient is well aware of the potential complications of surgery that include but not limited to bleeding, infections, deep venous thrombosis, pulmonary embolism, pulmonary complications such as pneumonia, cardiac events, hernias, small bowel obstruction, damage to the spleen or other organs, bowel injury, disfiguring scars, failure to lose weight, need for additional surgery, conversion to an open procedure, and death. Patient is also aware of complications which apply in this particular procedure that can include but are not limited to a \"leak\" at the staple line which in some instances may " require conversion to gastric bypass.    The patient is aware if a hiatal hernia is encountered, it likely will be repaired.  R/B/A Rx to hiatal hernia repair were discussed as outlined in our long consent form.  Briefly risks in addition to those for LSG include recurrent hernia, CEASAR, dysphagia, esophageal injury, pneumothorax, injury to the vagus nerves, injury to the thoracic duct, aorta or vena cava.    I discussed avoiding all tobacco products and second hand smoke at least 2 weeks pre-operatively and 6 weeks post-operatively to minimize the risk of sleeve leak.  This included discussing the importance of avoiding even secondhand smoke as the risk of leak is increased.  Examples discussed:  I made it very clear that the patient understands they should avoid even riding in a car where someone has previously smoked in the last 2 weeks, living in a house where someone smokes (even if it's in a separate room/patio/attached garage, etc.) we discussed that they should not have a conversation with a group of people who are smoking even if it's outside.  They can be around wood burning fires and barbecue.  I told them I do not know if marijuana has a same effects but my overall recommendation is to avoid it for 2 weeks prior in 6 weeks after surgery.  They also are aware that nicotine may also increase the risk of leak and I strongly encouraged him to avoid that as well for 2 weeks prior in 6 weeks after surgery.    Discussed the risks, benefits and alternative therapies at great length as outlined in our extensive consent forms, consent videos, and educational teaching process under the direction of the center's .    A copy of the patient's signed informed consent is on file.    R/B/A Rx discussed to postop anticoagulation incl but not limited to bleeding, drug reaction, venothromboembolic events, etc. and the patient declined.        Plan: After evaluation today I think the patient is a reasonable  candidate for laparoscopic sleeve gastrectomy.  Other issues include anxiety, dyspnea exertion, fatigue, folate deficiency, frequent headaches, H. pylori gastritis, hyperlipidemia, hypertension, hypothyroidism, joint pain, overactive bladder, rosacea, vitamin D deficiency, incomplete right bundle branch block, irritable bowel syndrome, elevated transaminases, PONV.        Rajinder Guzmán MD              Answers for HPI/ROS submitted by the patient on 10/18/2020   What is the primary reason for your visit?: Other  Please describe your symptoms.: Symptom:  being obese.    This is an appt for me to meet one-on-one with Dr. Guzmán to get my final approval for surgery.  Have you had these symptoms before?: Yes  How long have you been having these symptoms?: Greater than 2 weeks  Please list any medications you are currently taking for this condition.: Stahist-AD:  2/day, Elderberry Gummies -100 m/day, Cetirizine-10 m/day, Oxybutynin-10m/day, Apple Cider Vinegar:   4/day , , Vitamin D-5000 IU:  1/day, Vutamin E-400 IU:  1/day , Centrum Silver multi-vitamin:  1/day, Lisinopril-10 m/day, Biotin -1000 mc/day, Lipitor-10 m/day, Probiotics-2/day , , Plus:  I have purchased the vitamins you want me to take post-surgery....  Please describe any probable cause for these symptoms. : I am overweight and have struggled with my weight my entire life.  I have tried many different diets and exercise programs.   Could it possibly be genetic - or at least partly genetic?   Everyone in my family is overweight.      Electronically signed by Rajinder Guzmán MD at 10/20/20 7493       Vital Signs (last day)     Date/Time   Temp   Temp src   Pulse   Resp   BP   Patient Position   SpO2    10/22/20 0741   97.9 (36.6)   Oral   79   18   113/72   Lying   91    10/22/20 0500   --   --   76   --   --   --   91    10/22/20 0343   98.3 (36.8)   Oral   79   16   113/72   Lying   91    10/22/20 0300   --   --   82    --   --   --   90    10/22/20 0100   --   --   84   --   --   --   93    10/21/20 2337   98.8 (37.1)   Axillary   80   18   116/70   Lying   94    10/21/20 2300   --   --   86   --   --   --   97    10/21/20 2100   --   --   83   --   --   --   96    10/21/20 2026   98.4 (36.9)   Oral   78   18   116/69   Lying   96    10/21/20 1900   --   --   77   --   --   --   93    10/21/20 1600   --   --   81   --   --   --   --    10/21/20 1533   98.4 (36.9)   Oral   84   16   116/75   Lying   96    10/21/20 1400   --   --   84   --   --   --   --    10/21/20 1235   --   --   73   --   --   --   --    10/21/20 1152   98.3 (36.8)   Oral   72   18   134/83   Lying   93    10/21/20 1130   97.2 (36.2)   Temporal   70   18   139/89   --   98    10/21/20 1115   97.3 (36.3)   Temporal   69   18   135/92   --   98    10/21/20 1100   97.2 (36.2)   Temporal   70   18   139/87   --   98    10/21/20 1050   --   --   75   18   142/85   --   98    10/21/20 1045   97.2 (36.2)   Temporal   82   16   141/76   --   91    10/21/20 1040   --   --   82   14   139/85   --   92    10/21/20 1035   97.3 (36.3)   Temporal   88   14   138/82   --   92    10/21/20 0803   97.8 (36.6)   Temporal   78   18   128/80   Sitting   95                Current Facility-Administered Medications   Medication Dose Route Frequency Provider Last Rate Last Dose   • acetaminophen (TYLENOL) tablet 1,000 mg  1,000 mg Oral Q8H Rajinder Guzmán MD   1,000 mg at 10/22/20 0657   • ALPRAZolam (XANAX) tablet 0.25 mg  0.25 mg Oral Once PRN Rajinder Guzmán MD   0.25 mg at 10/22/20 0827   • atorvastatin (LIPITOR) tablet 10 mg  10 mg Oral Daily Rajinder Guzmán MD       • cetirizine (zyrTEC) tablet 10 mg  10 mg Oral Daily Rajinder Guzmán MD   10 mg at 10/22/20 0827   • Chlorcyclizine-Pseudoephed 25-60 MG tablet 1 tablet **Patient Supplied Medication**  1 tablet Oral BID Rajinder Guzmán MD   1 tablet at 10/21/20 2068   • diphenhydrAMINE (BENADRYL) injection 25  mg  25 mg Intravenous Q4H PRN Rajinder Guzmán MD       • enoxaparin (LOVENOX) syringe 40 mg  40 mg Subcutaneous Daily Rajinder Guzmán MD   40 mg at 10/22/20 0829   • ferric gluconate (FERRLECIT)125 MG IVPB  125 mg Intravenous Once PRN Rajinder Guzmán MD       • gabapentin (NEURONTIN) capsule 100 mg  100 mg Oral TID Rajinder Guzmán MD   100 mg at 10/22/20 0828   • hydrALAZINE (APRESOLINE) injection 10 mg  10 mg Intravenous Q30 Min PRN Rajinder Guzmán MD       • HYDROmorphone (DILAUDID) injection 1 mg  1 mg Intravenous Q2H PRN Rajinder Guzmán MD   1 mg at 10/21/20 1211    And   • naloxone (NARCAN) injection 0.1 mg  0.1 mg Intravenous Q5 Min PRN Rajinder Guzmán MD       • HYDROmorphone (DILAUDID) tablet 2 mg  2 mg Oral Q4H PRN Rajinder Guzmán MD       • lactated ringers infusion  150 mL/hr Intravenous Continuous Rajinder Guzmán  mL/hr at 10/21/20 1556 150 mL/hr at 10/21/20 1556   • levothyroxine (SYNTHROID, LEVOTHROID) tablet 25 mcg  25 mcg Oral Daily Rajinder Guzmán MD   25 mcg at 10/22/20 0827   • lisinopril (PRINIVIL,ZESTRIL) tablet 10 mg  10 mg Oral Daily Rajinder Guzmán MD   10 mg at 10/22/20 0827   • LORazepam (ATIVAN) injection 0.5 mg  0.5 mg Intravenous Q12H PRN Rajinder Guzmán MD       • LORazepam (ATIVAN) tablet 1 mg  1 mg Oral Q12H PRN Rajinder Guzmán MD       • metoclopramide (REGLAN) injection 10 mg  10 mg Intravenous Q6H PRN Rajinder Guzmán MD   10 mg at 10/21/20 1212   • Morphine sulfate (PF) injection 4 mg  4 mg Intravenous Q2H PRN Rajinder Guzmán MD        And   • naloxone (NARCAN) injection 0.4 mg  0.4 mg Intravenous Q5 Min PRN Rajinder Guzmán MD       • ondansetron (ZOFRAN) tablet 4 mg  4 mg Oral Q6H PRN Rajinder Guzmán MD       • oxybutynin XL (DITROPAN-XL) 24 hr tablet 10 mg  10 mg Oral Daily Rajinder Guzmán MD   10 mg at 10/22/20 0827   • oxyCODONE (ROXICODONE) immediate release tablet 5 mg  5 mg Oral  Q6H PRN Rajinder Guzmán MD       • pantoprazole (PROTONIX) injection 40 mg  40 mg Intravenous Q AM Rajinder Guzmán MD   40 mg at 10/22/20 0657   • phenol (CHLORASEPTIC) 1.4 % liquid 2 spray  2 spray Mouth/Throat Q2H PRN Rajinder Guzmán MD       • prochlorperazine (COMPAZINE) tablet 10 mg  10 mg Oral Q6H PRN Rajinder Guzmán MD       • promethazine (PHENERGAN) tablet 12.5 mg  12.5 mg Oral Q6H PRN Rajinder Guzmán MD       • simethicone (MYLICON) chewable tablet 80 mg  80 mg Oral 4x Daily PRN Rajinder Guzmán MD       • sodium chloride 0.45 % with KCl 20 mEq/L infusion  125 mL/hr Intravenous Continuous Rajinder Guzmán MD         Orders (active)      Start     Ordered    10/22/20 1000  Diet Bariatric; Sleeve; 1; Stage 1 Clear Liquid Sugar Free (No Carbonation, No Straw)  Diet Effective 1000     Comments: Stage I bariatric clear liquid, no high fructose corn syrup products or carbonation.  May have caffeine and straws. Once taking po, patient may take their own milk based protein shakes if desired    10/21/20 0843    10/22/20 0900  oxybutynin XL (DITROPAN-XL) 24 hr tablet 10 mg  Daily      10/21/20 0843    10/22/20 0900  ferric gluconate (FERRLECIT)125 MG IVPB  Once As Needed      10/21/20 0843    10/22/20 0900  enoxaparin (LOVENOX) syringe 40 mg  Daily      10/21/20 0843    10/22/20 0845  ondansetron (ZOFRAN) tablet 4 mg  Every 6 Hours PRN      10/21/20 0843    10/22/20 0800  sodium chloride 0.45 % with KCl 20 mEq/L infusion  Continuous      10/21/20 0843    10/22/20 0600  CBC & Differential  Daily     Comments: With auto diff      10/21/20 0843    10/22/20 0600  Comprehensive Metabolic Panel  Daily      10/21/20 0843    10/22/20 0600  pantoprazole (PROTONIX) injection 40 mg  Every Early Morning      10/21/20 0843    10/22/20 0500  FL Upper GI Single Contrast With KUB  1 Time Imaging      10/21/20 0843    10/22/20 0000  Request and Infuse Ferric Gluconate 125mg/110ml over 1 hour if lab  result is < 50  Once     Comments: Request and Infuse Ferric Gluconate 125mg/110ml Over 1 hour if Iron Lab Result Is < 50 (Normal Iron  mcg/dl)    10/21/20 0843    10/21/20 2100  Chlorcyclizine-Pseudoephed 25-60 MG tablet 1 tablet **Patient Supplied Medication**  2 times daily      10/21/20 1315    10/21/20 1600  Snack: Chewing Gum Three Times Daily x30 Minutes to Increase Saliva Flow and Provide Gas Pain Relief  3 Times Daily     Comments: Chewing Gum Three Times Daily x30 Minutes to Increase Saliva Flow and Provide Gas Pain Relief    10/21/20 0843    10/21/20 1130  Oscillating Positive Expiratory Pressure (OPEP)  Every 4 Hours While Awake - RT      10/21/20 0843    10/21/20 1028  Oxygen Therapy- Blow by - Humidified; Titrate for SPO2: equal to or greater than, 96%, per policy  Continuous      10/21/20 1027    10/21/20 1028  Pulse Oximetry, Continuous  Continuous      10/21/20 1027    10/21/20 1000  Vital Signs  Every 2 Hours      10/21/20 0843    10/21/20 0900  atorvastatin (LIPITOR) tablet 10 mg  Daily      10/21/20 0843    10/21/20 0900  cetirizine (zyrTEC) tablet 10 mg  Daily      10/21/20 0843    10/21/20 0900  levothyroxine (SYNTHROID, LEVOTHROID) tablet 25 mcg  Daily      10/21/20 0843    10/21/20 0900  lisinopril (PRINIVIL,ZESTRIL) tablet 10 mg  Daily      10/21/20 0843    10/21/20 0900  gabapentin (NEURONTIN) capsule 100 mg  3 Times Daily      10/21/20 0843    10/21/20 0845  lactated ringers infusion  Continuous      10/21/20 0843    10/21/20 0845  acetaminophen (TYLENOL) tablet 1,000 mg  Every 8 Hours Scheduled      10/21/20 0843    10/21/20 0842  Code Status and Medical Interventions:  Continuous      10/21/20 0843    10/21/20 0842  Intake and Output  Every Shift      10/21/20 0843    10/21/20 0842  Notify physician (specify)  Until Discontinued      10/21/20 0843    10/21/20 0842  Incentive Spirometry  Every Hour While Awake      10/21/20 0843    10/21/20 0842  Maintain Sequential Compression  Device  Continuous      10/21/20 0843    10/21/20 0841  ALPRAZolam (XANAX) tablet 0.25 mg  Once As Needed      10/21/20 0843    10/21/20 0841  hydrALAZINE (APRESOLINE) injection 10 mg  Every 30 Minutes PRN      10/21/20 0843    10/21/20 0841  promethazine (PHENERGAN) tablet 12.5 mg  Every 6 Hours PRN      10/21/20 0843    10/21/20 0841  metoclopramide (REGLAN) injection 10 mg  Every 6 Hours PRN      10/21/20 0843    10/21/20 0841  prochlorperazine (COMPAZINE) tablet 10 mg  Every 6 Hours PRN      10/21/20 0843    10/21/20 0841  phenol (CHLORASEPTIC) 1.4 % liquid 2 spray  Every 2 Hours PRN      10/21/20 0843    10/21/20 0841  simethicone (MYLICON) chewable tablet 80 mg  4 Times Daily PRN      10/21/20 0843    10/21/20 0841  diphenhydrAMINE (BENADRYL) injection 25 mg  Every 4 Hours PRN      10/21/20 0843    10/21/20 0841  LORazepam (ATIVAN) tablet 1 mg  Every 12 Hours PRN      10/21/20 0843    10/21/20 0841  LORazepam (ATIVAN) injection 0.5 mg  Every 12 Hours PRN      10/21/20 0843    10/21/20 0841  oxyCODONE (ROXICODONE) immediate release tablet 5 mg  Every 6 Hours PRN      10/21/20 0843    10/21/20 0841  Morphine sulfate (PF) injection 4 mg  Every 2 Hours PRN      10/21/20 0843    10/21/20 0841  naloxone (NARCAN) injection 0.4 mg  Every 5 Minutes PRN      10/21/20 0843    10/21/20 0841  HYDROmorphone (DILAUDID) tablet 2 mg  Every 4 Hours PRN      10/21/20 0843    10/21/20 0841  HYDROmorphone (DILAUDID) injection 1 mg  Every 2 Hours PRN      10/21/20 0843    10/21/20 0841  naloxone (NARCAN) injection 0.1 mg  Every 5 Minutes PRN      10/21/20 0843    Unscheduled  Ambulate with assistance 4 times per shift.  As Needed      10/21/20 0843    Unscheduled  Up in Chair  As Needed      10/21/20 0843    Unscheduled  SCDs while in bed.  As Needed      10/21/20 0843    Unscheduled  Patient May Use Home CPAP / BIPAP For Sleep or As Needed  As Needed      10/21/20 0843                   Operative/Procedure Notes (all)      Arielle  Rajinder Proctor MD at 10/21/20 0917  Version 1 of 1         GASTRIC SLEEVE LAPAROSCOPIC, ESOPHAGOGASTRODUODENOSCOPY  Progress Note    La Gambino  10/21/2020    Pre-op Diagnosis:   Morbid exogenous obesity (CMS/HCC) [E66.01]       Post-Op Diagnosis Codes:     * Morbid exogenous obesity (CMS/HCC) [E66.01]    Procedure/CPT® Codes:  CA LAP, MARIA ELENA RESTRICT PROC, LONGITUDINAL GASTRECTOMY [81574]  CA ESOPHAGOGASTRODUODENOSCOPY TRANSORAL DIAGNOSTIC [85396]      Procedure(s):  GASTRIC SLEEVE LAPAROSCOPIC  ESOPHAGOGASTRODUODENOSCOPY    Surgeon(s):  Rajinder Guzmán MD     Asst:  Olga Gross MD PGY-4    Anesthesia: General with Block    Staff:   Circulator: Yoon Devine RN; Caitlin Jesus RN  Scrub Person: Tesha Monique; Viktor Coffey         Estimated Blood Loss: minimal    Urine Voided: * No values recorded between 10/21/2020  8:54 AM and 10/21/2020 10:26 AM *    Specimens:                Specimens     ID Source Type Tests Collected By Collected At Frozen?      A Stomach Tissue · TISSUE PATHOLOGY EXAM   Rajinder Guzmán MD 10/21/20 0950 No     Description: SUB-TOTAL GASTRECTOMY    This specimen was not marked as sent.                Drains: * No LDAs found *    Findings:     Complications: none          Rajinder Guzmán MD     Date: 10/21/2020  Time: 10:26 EDT        Electronically signed by Rajinder Guzmán MD at 10/21/20 1027     Rajinder Guzmán MD at 10/21/20 0917  Version 1 of 1         Preoperative Diagnosis:   Morbid Obesity with Multiple Co-Morbidities    Postoperative Diagnosis:   Same    Procedure:                                                      Laparoscopic Sleeve Gastrectomy (85% subtotal vertical gastrectomy)                                                                         EGD                                                                       Surgeon:                                                       CHUY Guzmán MD    Asst:                                                             Olga Azul MD PGY-4    Anesthesia:                                                   GETA    EBL:                                                              Minimal    Fluids:                                                           Crystalloid    Specimens:                                                   Subtotal gastrectomy    Drains:                                                           None    Counts:                                                          Correct    Complications:                                               None    Indications:   This is a 55 year-old morbidly obese female who presents for elective laparoscopic sleeve gastrectomy.  She's undergone our extensive preoperative education teaching and consent process everything's in order and she wishes to proceed.      Operative technique:     The patient was brought to the operating room, and placed supine upon the operating room table.  SCD hose were placed, she underwent uneventful general endotracheal anesthesia per the anesthesiology staff, she received IV Ancef and subcutaneous Lovenox, the anesthesiology staff performed a tap block, and her abdomen was prepped and draped with ChloraPrep in a sterile fashion prepping her insulin pumps in the lower abdomen out of the field, an Ioban was used as well, a Be catheter was not placed.    The peritoneal cavity was entered in the high in the left midclavicular line using an 5 mm trocar and an Optiview technique and the abdomen was insufflated to a pressure of 15 mmHg with CO2 gas.  Exploratory laparoscopy revealed no evidence of injury from the entrance technique, a normal-appearing liver, a mild rectus diastases, a normal-appearing gallbladder.  Remaining trocars were placed under direct visualization including a 5 mm trocar in the left subcostal position, a 12 mm fascial splitting trocar to the right of the umbilicus and across from this to the left  of midline a 15 mm trocar.  Through a stab incision in the epigastrium a Michelle retractor was used to elevate the left lobe of the liver exposing the hiatus.  There was no visible hiatal hernia from the anterior view and this was photodocumented.  Beginning approximately two thirds of the way around the greater curvature the stomach, the gastrocolic vessels were divided with the LigaSure device.  This proceeded proximally taking down all the short gastric vessels and exposing the left princess. There were no posterior hernias or lipomas and this was photodocumented.  Quite a bit of oozing noted from all surfaces throughout the case, no uncontrolled bleeding and overall minimal blood loss.  Gastrocolic vessels were then divided medially to a few cm proximal to the pylorus.  Fairly extensive filmy attachments of the posterior stomach to the pancreas and retroperitoneum were divided.  The previously placed orogastric tube was positioned into the distal antrum.  The stomach was marked with a Kitner saturated with a marking pen 1 cm lateral to the angle of His, 3 cm away from the angularis, and 6 cm from the pylorus.  The 22 cm bariatric standard clamp was then positioned just inside these markings. The 85% subtotal vertical sleeve gastrectomy was then performed using a Kelkooia articulating electronic linear stapler with dual absorbable strips.  The first firing was a 60 mm black load, the next 3 firings were 60 mm purple loads.  The OG and then the Standard clamp were removed.  The sleeve was performed such that it was uniform in size, no hourglassing or narrowing, especially at the angularis, and the final firing was done a centimeter away from the angle of His to hopefully avoid incorporating esophageal fibers.  The subtotal gastrectomy specimen was placed into a large retrieval bag and withdrawn and placed on a separate Paloma stand, it was an average size specimen.  At this time a moderate sized superior pole  splenic infarct noted and photodocumented.  The sleeve was submerged under saline.  Upper endoscopy was performed, and the endoscope was advanced into the duodenal bulb.  No air bubbles or leak seen, no bleeding at the staple line, no narrowing at the angularis, no pyloric spasm or deformity, edema and erythema of the gastric mucosa consistent with H. pylori gastritis changes (photodocumented), no hiatal hernia or Banks's esophagus, and the endoscope was withdrawn.  The subtotal gastrectomy specimen was inspected and sent to pathology for permanent section.  Irrigation fluid was suctioned free.  The sleeve was resting nicely and hemostatic.  The sleeve staple line was treated with 4 cc of aerosolized Tisseel fibrin glue.  Photodocumentation of the sleeve obtained.  Fascia at the 15 mm trocar site incision was closed with a horizontal mattress 0 Vicryl suture placed with a suture passer under direct visualization and tying the knot extracorporeally.  Remaining trocars were removed under direct visualization, no bleeding noted from their sites.  Subcutaneous tissue in the 15 mm incision was closed with an interrupted 2-0 Vicryl plus suture, and skin in each incision was closed using 3-0 Monocryl plus in an interrupted subcuticular stitch followed by skin-a-fix.  The patient tolerated the procedure well without complication, was taken to the recovery room in stable condition.    Electronically signed by Rajinder Guzmán MD at 10/21/20 1031       Physician Progress Notes (all)    No notes of this type exist for this encounter.         Consult Notes (all)    No notes of this type exist for this encounter.

## 2020-10-23 RX ORDER — OMEPRAZOLE 40 MG/1
40 CAPSULE, DELAYED RELEASE ORAL DAILY
Qty: 60 CAPSULE | Refills: 1 | Status: SHIPPED | OUTPATIENT
Start: 2020-10-23 | End: 2021-02-04

## 2020-10-24 LAB
CYTO UR: NORMAL
LAB AP CASE REPORT: NORMAL
LAB AP CLINICAL INFORMATION: NORMAL
PATH REPORT.FINAL DX SPEC: NORMAL
PATH REPORT.GROSS SPEC: NORMAL

## 2020-11-03 RX ORDER — URSODIOL 300 MG/1
300 CAPSULE ORAL 2 TIMES DAILY
Qty: 60 CAPSULE | Refills: 5 | Status: SHIPPED | OUTPATIENT
Start: 2020-11-03 | End: 2020-12-03

## 2020-11-04 ENCOUNTER — TELEMEDICINE (OUTPATIENT)
Dept: BARIATRICS/WEIGHT MGMT | Facility: CLINIC | Age: 55
End: 2020-11-04

## 2020-11-04 VITALS — WEIGHT: 184 LBS | BODY MASS INDEX: 36.12 KG/M2 | HEIGHT: 60 IN

## 2020-11-04 DIAGNOSIS — E66.01 MORBID OBESITY (HCC): ICD-10-CM

## 2020-11-04 DIAGNOSIS — E78.5 HYPERLIPIDEMIA, UNSPECIFIED HYPERLIPIDEMIA TYPE: ICD-10-CM

## 2020-11-04 DIAGNOSIS — A04.8 H. PYLORI INFECTION: Primary | ICD-10-CM

## 2020-11-04 DIAGNOSIS — R53.83 FATIGUE, UNSPECIFIED TYPE: ICD-10-CM

## 2020-11-04 DIAGNOSIS — M25.50 ARTHRALGIA, UNSPECIFIED JOINT: ICD-10-CM

## 2020-11-04 PROCEDURE — 99024 POSTOP FOLLOW-UP VISIT: CPT | Performed by: SURGERY

## 2020-11-04 NOTE — PROGRESS NOTES
"[facility]ariatric Surgery  [address]  [address]  [facility] [address] [phone]      Patient Name:  La Gambino.  :  1965      Date of Visit: 2020      Reason for Visit:  POD #14    HPI:  La Gambino is a 55 y.o. female s/p 10/21/20 LSG by Dr. Guzmán.    She had an itchy red reaction on her abdomen that is now resolved.  Seemed to be in the distribution of the chlorhexidine or ioban?  Has also noticed she has had this reaction with adhesive.    She used hydrocortisone cream sparingly with improvement.      Her mother unexpectedly  last week after a procedure, possibly nicked some bowel-- 48 hours post op.      Doing well.  No major GI issues/concerns. Denies dysphagia, reflux, nausea, vomiting, abdominal pain, pulmonary issues and fevers.  Tolerating diet progression - on stage 2.  Getting 100-120g prot/day.  Drinking 64 fluid oz/day.  Taking MVI, B12, B1, Calcium, Vit D, iron and Vit C.  On Omeprazole  and Actigall .  Holding ASA , NSAIDs  and Steroids.  Ambulating.     Presurgery weight: 198 pounds.  Today's weight is 83.5 kg (184 lb) pounds, today's  Body mass index is 35.94 kg/m²., and her weight loss since surgery is 14 pounds.       Path reviewed with patient:  Final Diagnosis    STOMACH, SUBTOTAL GASTRECTOMY:               Chronic inactive gastritis.               No H. Pylori organisms are identified by immunohistochemistry (control reacted appropriately).               Negative for dysplasia and intestinal metaplasia.         Past Medical History:   Diagnosis Date   • Anxiety 2018    better now, no meds   • Dyspepsia    • Dyspnea on exertion    • Elevated transaminase level    • Fatigue    • Folate deficiency    • Frequent headaches     \"extremely tight neck muscles\"   • H. pylori infection     UBT/EGD bx (+) - PrevPak RX 20, repeat UBT (+) - Pylera RX 20   • Hair thinning     on biotin   • Hyperlipidemia    • " Hypertension 2012   • Hypothyroidism    • IBS (irritable bowel syndrome)    • Incomplete right bundle branch block    • Joint pain    • Morbid obesity (CMS/HCC)    • OAB (overactive bladder)     w/ urgency and stress incontinence   • PONV (postoperative nausea and vomiting)    • Rosacea    • Vitamin D deficiency    • Wears glasses      Past Surgical History:   Procedure Laterality Date   • CERVICAL CONIZATION, LEEP     •  SECTION     • COLONOSCOPY N/A 4/3/2017    Procedure: COLONOSCOPY ;  Surgeon: Prema Phillip MD;  Location:  [facility];  Service:    • CYST REMOVAL     • DILATATION AND CURETTAGE     • ENDOMETRIAL ABLATION     • ENDOSCOPY N/A 10/21/2020    Procedure: ESOPHAGOGASTRODUODENOSCOPY;  Surgeon: Rajinder Guzmán MD;  Location:  [facility];  Service: Bariatric;  Laterality: N/A;   • GASTRIC SLEEVE LAPAROSCOPIC N/A 10/21/2020    Procedure: GASTRIC SLEEVE LAPAROSCOPIC;  Surgeon: Rajinder Guzmán MD;  Location:  [facility];  Service: Bariatric;  Laterality: N/A;   • LASIK  2018   • MASTOPEXY Bilateral    • NASAL SEPTUM SURGERY     • SOFT TISSUE CYST EXCISION      from (L) side   • TOTAL LAPAROSCOPIC HYSTERECTOMY      benign dz, w/ bladder tuck   • WISDOM TOOTH EXTRACTION       No outpatient medications have been marked as taking for the 20 encounter (Telemedicine) with Marti Canseco MD.     No Known Allergies    Social History     Socioeconomic History   • Marital status:      Spouse name: Not on file   • Number of children: Not on file   • Years of education: Not on file   • Highest education level: Not on file   Tobacco Use   • Smoking status: Never Smoker   • Smokeless tobacco: Never Used   Substance and Sexual Activity   • Alcohol use: No   • Drug use: No   • Sexual activity: Yes     Partners: Male     Birth control/protection: Post-menopausal     Comment: Had a hysterectomy....   Social History Narrative    L[place]  [place] w/  Christian Gambino, daughter (12yo), and her mother.  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        Ht 152.4 cm (60\")   Wt 83.5 kg (184 lb)   BMI 35.94 kg/m²   Physical Exam  Constitutional:       General: She is not in acute distress.     Appearance: Normal appearance. She is not ill-appearing.   HENT:      Head: Normocephalic and atraumatic.      Nose: Nose normal.   Eyes:      General: No scleral icterus.     Extraocular Movements: Extraocular movements intact.      Conjunctiva/sclera: Conjunctivae normal.      Pupils: Pupils are equal, round, and reactive to light.   Pulmonary:      Effort: Pulmonary effort is normal. No respiratory distress.   Abdominal:      Comments: incisions are well-healing without erythema or drainage   Skin:     Coloration: Skin is not pale.   Neurological:      Mental Status: She is alert and oriented to person, place, and time.   Psychiatric:         Mood and Affect: Mood normal.         Behavior: Behavior normal.           Assessment:   POD # 14      Plan:  Doing well. Continue to advance diet per manual.  Increase protein intake to 100g/day.  Actigall Rx already given b/c she messaged.  Increase exercise/activity as tolerated.  Reviewed lifting restrictions, nothing >25 lbs x 2 more weeks.  Continue vitamins.  Continue PPI.  Continue to avoid ASA/NSAIDs/Steroids x 6 weeks postop.  Call w/ problems/concerns.    The patient was instructed to follow up in 3 weeks, sooner if needed.     She has some scoliosis evident on her UGI that the patient actually noticed herself.  I reviewed the images and agree, although the radiologist did not comment upon it.  Her mother and grandmother both had debilitating scoliosis in later years, and patient wants to be proactive.  Will request that radiology images and report be sent to her doctor.  Pt also asked that op note be sent and will do this as well.    I am not sure exactly what contributed to her " rash, but may be secondary to adhesive from the Ioban vs. Chlorhexidine.      We discussed her RBBB on preop EKG.  I suggested her d/w PCP.    This visit was conducted as a video visit, in an effort to limit spread of the novel coronavirus during the 2020 pandemic.        Marti Canseco MD

## 2020-11-16 ENCOUNTER — OFFICE VISIT (OUTPATIENT)
Dept: BARIATRICS/WEIGHT MGMT | Facility: CLINIC | Age: 55
End: 2020-11-16

## 2020-11-16 VITALS
RESPIRATION RATE: 18 BRPM | HEART RATE: 70 BPM | WEIGHT: 180.5 LBS | TEMPERATURE: 97.1 F | HEIGHT: 60 IN | SYSTOLIC BLOOD PRESSURE: 115 MMHG | OXYGEN SATURATION: 97 % | DIASTOLIC BLOOD PRESSURE: 68 MMHG | BODY MASS INDEX: 35.44 KG/M2

## 2020-11-16 DIAGNOSIS — Z90.3 POSTGASTRECTOMY MALABSORPTION: ICD-10-CM

## 2020-11-16 DIAGNOSIS — R53.83 FATIGUE, UNSPECIFIED TYPE: ICD-10-CM

## 2020-11-16 DIAGNOSIS — K91.2 POSTGASTRECTOMY MALABSORPTION: ICD-10-CM

## 2020-11-16 DIAGNOSIS — E66.9 OBESITY, CLASS II, BMI 35-39.9: ICD-10-CM

## 2020-11-16 DIAGNOSIS — Z13.21 MALNUTRITION SCREEN: ICD-10-CM

## 2020-11-16 DIAGNOSIS — Z13.0 SCREENING, IRON DEFICIENCY ANEMIA: ICD-10-CM

## 2020-11-16 DIAGNOSIS — E55.9 HYPOVITAMINOSIS D: ICD-10-CM

## 2020-11-16 DIAGNOSIS — Z98.84 STATUS POST BARIATRIC SURGERY: Primary | ICD-10-CM

## 2020-11-16 PROCEDURE — 99024 POSTOP FOLLOW-UP VISIT: CPT | Performed by: PHYSICIAN ASSISTANT

## 2020-11-16 RX ORDER — AMOXICILLIN 500 MG/1
500 CAPSULE ORAL 2 TIMES DAILY
COMMUNITY
Start: 2020-11-09 | End: 2021-03-22

## 2020-11-16 RX ORDER — CIPROFLOXACIN AND DEXAMETHASONE 3; 1 MG/ML; MG/ML
SUSPENSION/ DROPS AURICULAR (OTIC)
COMMUNITY
Start: 2020-11-09 | End: 2021-09-29

## 2020-11-16 NOTE — PROGRESS NOTES
"[facility]ariatric Surgery  [address]  [address]  [facility] [id number] [phone]      Patient Name:  La Gambino.  :  1965      Reason for Visit:  1 month postop    HPI:  La Gambino is a 55 y.o. female s/p 10/21/20 LSG by Dr. Guzmán.    Doing well. Wondering is she is losing too slow. Has noticed week long stall. Notes some pressure, like things back up if eating 2 eggs at a time.   No other GI issues/concerns. Denies dysphagia, reflux, nausea, vomiting, abdominal pain, pulmonary issues and fevers.  Tolerating diet progression - on stage 5.  Getting 100g prot/day.  Eating 4 times a day, combination of shakes and food.   Drinking 64+ fluid oz/day.  Taking MVI, B12, B1, Calcium, Vit D, iron and Vit C.  On Omeprazole  and Actigall .  Still holding ASA , NSAIDs , Tramadol, Hormones, Diuretics , Steroids and Immunologics.  Exercising- walking.     Presurgery weight:  198 pounds. Today's weight is 81.9 kg (180 lb 8 oz) pounds, today's Body mass index is 35.25 kg/m²., and@ weight loss since surgery is 18 pounds.       Past Medical History:   Diagnosis Date   • Anxiety 2018    better now, no meds   • Dyspepsia    • Dyspnea on exertion    • Elevated transaminase level    • Fatigue    • Folate deficiency    • Frequent headaches     \"extremely tight neck muscles\"   • H. pylori infection     UBT/EGD bx (+) - PrevPak RX 20, repeat UBT (+) - Pylera RX 20   • Hair thinning     on biotin   • Hyperlipidemia    • Hypertension 2012   • Hypothyroidism    • IBS (irritable bowel syndrome)    • Incomplete right bundle branch block    • Joint pain    • Morbid obesity (CMS/HCC)    • OAB (overactive bladder)     w/ urgency and stress incontinence   • PONV (postoperative nausea and vomiting)    • Rosacea    • Vitamin D deficiency    • Wears glasses      Past Surgical History:   Procedure Laterality Date   • CERVICAL CONIZATION, LEEP     •  SECTION   "   • COLONOSCOPY N/A 4/3/2017    Procedure: COLONOSCOPY ;  Surgeon: Prema Phillip MD;  Location:  [facility];  Service:    • CYST REMOVAL     • DILATATION AND CURETTAGE  1996   • ENDOMETRIAL ABLATION  2009   • ENDOSCOPY N/A 10/21/2020    Procedure: ESOPHAGOGASTRODUODENOSCOPY;  Surgeon: Rajinder Guzmán MD;  Location:  [facility];  Service: Bariatric;  Laterality: N/A;   • GASTRIC SLEEVE LAPAROSCOPIC N/A 10/21/2020    Procedure: GASTRIC SLEEVE LAPAROSCOPIC;  Surgeon: Rajinder Guzmán MD;  Location:  [facility];  Service: Bariatric;  Laterality: N/A;   • LASIK  2018   • MASTOPEXY Bilateral 2003   • NASAL SEPTUM SURGERY  2005   • SOFT TISSUE CYST EXCISION  2012    from (L) side   • TOTAL LAPAROSCOPIC HYSTERECTOMY  2010    benign dz, w/ bladder tuck   • WISDOM TOOTH EXTRACTION  1983     Outpatient Medications Marked as Taking for the 11/16/20 encounter (Office Visit) with Alina Mcpherson PA-C   Medication Sig Dispense Refill   • amoxicillin (AMOXIL) 500 MG capsule Take 500 mg by mouth 2 (Two) Times a Day.     • APPLE CIDER VINEGAR PO Take 2 tablets by mouth 2 (two) times a day.     • atorvastatin (LIPITOR) 10 MG tablet Take 10 mg by mouth Every Night.     • B Complex Vitamins (VITAMIN B COMPLEX PO) Take 1 tablet by mouth Daily.     • Biotin 1000 MCG tablet Take 1,000 mcg by mouth Daily.     • cetirizine (zyrTEC) 10 MG tablet Take 10 mg by mouth Daily.     • Chlorcyclizine-Pseudoephed (Stahist AD) 25-60 MG tablet Take 1 tablet by mouth 2 (Two) Times a Day.     • Cholecalciferol (VITAMIN D3) 50 MCG (2000 UT) tablet Take 5,000 Units by mouth Daily.     • ciprofloxacin-dexamethasone (CIPRODEX) 0.3-0.1 % otic suspension      • levothyroxine (SYNTHROID, LEVOTHROID) 25 MCG tablet Take 25 mcg by mouth Every Morning.     • lisinopril (PRINIVIL,ZESTRIL) 10 MG tablet Take 10 mg by mouth Daily.     • loratadine (CLARITIN) 10 MG tablet Take 10 mg by mouth Daily.     • Multiple Vitamins-Minerals (MULTIVITAMIN  "ADULT PO) Take  by mouth.     • omeprazole (PrilOSEC) 40 MG capsule Take 1 capsule by mouth Daily for 60 days. 60 capsule 1   • oxybutynin XL (DITROPAN XL) 10 MG 24 hr tablet Take 10 mg by mouth Daily.     • ursodiol (ACTIGALL) 300 MG capsule Take 1 capsule by mouth 2 (Two) Times a Day for 30 days. 60 capsule 5   • vitamin C (ASCORBIC ACID) 500 MG tablet Take 500 mg by mouth Daily.     • vitamin E 400 UNIT capsule Take 400 Units by mouth Daily.     • [DISCONTINUED] ondansetron (Zofran) 4 MG tablet Take 1 tablet by mouth Every 4 (Four) Hours As Needed for Nausea. 8 tablet 0     No Known Allergies    Social History     Socioeconomic History   • Marital status:      Spouse name: Not on file   • Number of children: Not on file   • Years of education: Not on file   • Highest education level: Not on file   Tobacco Use   • Smoking status: Never Smoker   • Smokeless tobacco: Never Used   Substance and Sexual Activity   • Alcohol use: No   • Drug use: No   • Sexual activity: Yes     Partners: Male     Birth control/protection: Post-menopausal     Comment: Had a hysterectomy....   Social History Narrative    Lives in [place], [place] w/  Christian Gambino, daughter (12yo), and her mother.  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        /68 (BP Location: Left arm, Patient Position: Sitting, Cuff Size: Adult)   Pulse 70   Temp 97.1 °F (36.2 °C) (Temporal)   Resp 18   Ht 152.4 cm (60\")   Wt 81.9 kg (180 lb 8 oz)   SpO2 97%   BMI 35.25 kg/m²     Physical Exam  Constitutional:       Appearance: She is well-developed.   HENT:      Head: Normocephalic and atraumatic.   Cardiovascular:      Rate and Rhythm: Normal rate and regular rhythm.   Pulmonary:      Effort: Pulmonary effort is normal.      Breath sounds: Normal breath sounds.   Abdominal:      General: Bowel sounds are normal.      Palpations: Abdomen is soft.      Comments: Incisions healing well   Skin:     General: Skin is warm " and dry.   Neurological:      Mental Status: She is alert.   Psychiatric:         Behavior: Behavior normal.           Assessment:  1 month s/p 10/21/20 LSG by Dr. Guzmán.    ICD-10-CM ICD-9-CM   1. Status post bariatric surgery  Z98.84 V45.86   2. Obesity, Class II, BMI 35-39.9  E66.9 278.00   3. Fatigue, unspecified type  R53.83 780.79   4. Hypovitaminosis D  E55.9 268.9   5. Screening, iron deficiency anemia  Z13.0 V78.0   6. Malnutrition screen  Z13.21 V77.2   7. Postgastrectomy malabsorption  K91.2 579.3    Z90.3          Plan:  Doing well.  Continue to advance diet per manual.  Continue protein 70-100g/day.  Encouraged good food choices - high protein, low carb.  Continue fluids 64oz per day. Continue routine exercise, lifting restrictions lifted.  Continue PPI. Continue to avoid NSAIDS, ASA, tramadol, tobacco x 6 weeks postop, steroids x 8 weeks postop.  Routine bariatric labs ordered.  Continue vitamins w/ adjustments pending lab results.  Call w/ problems/concerns.    Patient's Body mass index is 35.25 kg/m². BMI is above normal parameters. Recommendations include: exercise counseling and nutrition counseling.        The patient was instructed to follow up in 2 months, sooner if needed.

## 2020-11-21 LAB
25(OH)D3+25(OH)D2 SERPL-MCNC: 103 NG/ML (ref 30–100)
ALBUMIN SERPL-MCNC: 4.6 G/DL (ref 3.8–4.9)
ALBUMIN/GLOB SERPL: 2.1 {RATIO} (ref 1.2–2.2)
ALP SERPL-CCNC: 83 IU/L (ref 39–117)
ALT SERPL-CCNC: 20 IU/L (ref 0–32)
AST SERPL-CCNC: 23 IU/L (ref 0–40)
BASOPHILS # BLD AUTO: 0.1 X10E3/UL (ref 0–0.2)
BASOPHILS NFR BLD AUTO: 1 %
BILIRUB SERPL-MCNC: 0.3 MG/DL (ref 0–1.2)
BUN SERPL-MCNC: 21 MG/DL (ref 6–24)
BUN/CREAT SERPL: 33 (ref 9–23)
CALCIUM SERPL-MCNC: 9.6 MG/DL (ref 8.7–10.2)
CHLORIDE SERPL-SCNC: 105 MMOL/L (ref 96–106)
CO2 SERPL-SCNC: 23 MMOL/L (ref 20–29)
CREAT SERPL-MCNC: 0.63 MG/DL (ref 0.57–1)
EOSINOPHIL # BLD AUTO: 0.2 X10E3/UL (ref 0–0.4)
EOSINOPHIL NFR BLD AUTO: 3 %
ERYTHROCYTE [DISTWIDTH] IN BLOOD BY AUTOMATED COUNT: 12.7 % (ref 11.7–15.4)
FERRITIN SERPL-MCNC: 201 NG/ML (ref 15–150)
FOLATE SERPL-MCNC: >20 NG/ML
GLOBULIN SER CALC-MCNC: 2.2 G/DL (ref 1.5–4.5)
GLUCOSE SERPL-MCNC: 90 MG/DL (ref 65–99)
HCT VFR BLD AUTO: 43.2 % (ref 34–46.6)
HGB BLD-MCNC: 14.9 G/DL (ref 11.1–15.9)
IMM GRANULOCYTES # BLD AUTO: 0 X10E3/UL (ref 0–0.1)
IMM GRANULOCYTES NFR BLD AUTO: 0 %
IRON SERPL-MCNC: 66 UG/DL (ref 27–159)
LYMPHOCYTES # BLD AUTO: 1.7 X10E3/UL (ref 0.7–3.1)
LYMPHOCYTES NFR BLD AUTO: 23 %
Lab: NORMAL
MCH RBC QN AUTO: 32.2 PG (ref 26.6–33)
MCHC RBC AUTO-ENTMCNC: 34.5 G/DL (ref 31.5–35.7)
MCV RBC AUTO: 93 FL (ref 79–97)
METHYLMALONATE SERPL-SCNC: 119 NMOL/L (ref 0–378)
MONOCYTES # BLD AUTO: 0.7 X10E3/UL (ref 0.1–0.9)
MONOCYTES NFR BLD AUTO: 9 %
NEUTROPHILS # BLD AUTO: 4.7 X10E3/UL (ref 1.4–7)
NEUTROPHILS NFR BLD AUTO: 64 %
PLATELET # BLD AUTO: 300 X10E3/UL (ref 150–450)
POTASSIUM SERPL-SCNC: 4.8 MMOL/L (ref 3.5–5.2)
PREALB SERPL-MCNC: 22 MG/DL (ref 10–36)
PROT SERPL-MCNC: 6.8 G/DL (ref 6–8.5)
RBC # BLD AUTO: 4.63 X10E6/UL (ref 3.77–5.28)
SODIUM SERPL-SCNC: 140 MMOL/L (ref 134–144)
VIT B1 BLD-SCNC: 324.2 NMOL/L (ref 66.5–200)
WBC # BLD AUTO: 7.3 X10E3/UL (ref 3.4–10.8)

## 2020-12-16 ENCOUNTER — HOSPITAL ENCOUNTER (OUTPATIENT)
Dept: PHYSICAL THERAPY | Facility: HOSPITAL | Age: 55
Setting detail: THERAPIES SERIES
Discharge: HOME | End: 2020-12-16
Payer: OTHER GOVERNMENT

## 2020-12-16 PROCEDURE — 97110 THERAPEUTIC EXERCISES: CPT

## 2020-12-16 PROCEDURE — 97161 PT EVAL LOW COMPLEX 20 MIN: CPT

## 2020-12-16 NOTE — PROGRESS NOTES
Physical Therapy  Initial Assessment  Date: 2020  Patient Name: Noelle Castellanos  MRN: [id number]  : 1965     Treatment Diagnosis: LBP; spondylolisthesis; facet arthropathy    Restrictions  Restrictions/Precautions  Restrictions/Precautions: General Precautions  Required Braces or Orthoses?: No    Subjective   General  Chart Reviewed: Yes  Patient assessed for rehabilitation services?: Yes  Response To Previous Treatment: Not applicable  Family / Caregiver Present: No  Referring Practitioner: Marisol Mcneal PA-C  Diagnosis: LBP, spondylolisthesis + scoliosis  Follows Commands: Within Functional Limits  PT Visit Information  PT Insurance Information:   Subjective  Subjective: Patient reports: history of chronic low back pain, progressive in nature--has worsened over the past several months; pain is localized; denies radiculopathy or neurologic symptoms; had x-rays = reports: spondylolisthesis, and a 26 degree scoliosis; pain is worse with prolonged standing and with general physical activity  Pain Screening  Patient Currently in Pain: Yes  Pain Assessment  Pain Assessment: 0-10  Pain Level: 2  Pain Type: Chronic pain;Acute pain  Pain Location: Back  Pain Orientation: Lower  Pain Descriptors: Aching;Sore;Tightness  Pain Frequency: Intermittent  Pain Onset: On-going  Clinical Progression: Gradually worsening  Vital Signs  Patient Currently in Pain: Yes    Vision/Hearing  Vision  Vision: Within Functional Limits  Hearing  Hearing: Within functional limits    Orientation  Orientation  Overall Orientation Status: Within Normal Limits    Social/Functional History  Social/Functional History  Lives With: Spouse  Active : Yes  Occupation: Retired    Objective     Observation/Palpation  Posture: Fair  Palpation: (+) point tenderness lumbar paraspinals  Observation: Gait is WNL;    AROM RLE (degrees)  RLE AROM: WFL  AROM LLE (degrees)  LLE AROM : WFL  Spine  Lumbar: AROM: FF = 60, BB = 15, SB = 25, ROT = 25  Special Tests: (+) lumbar quadrants for localized pain; (-) slump and SLR tests    Strength RLE  Strength RLE: WFL  Strength LLE  Strength LLE: WFL  Strength Other  Other: No myotome weakness identified  Tone RLE  RLE Tone: Normotonic  Tone LLE  LLE Tone: Normotonic  Motor Control  Gross Motor?: WFL  Additional Measures  Other: Back Indx = 26  Sensation  Overall Sensation Status: WFL                   Exercises  Exercise 1: LTR  x 30  Exercise 2: Hip isometrics: abd, add 2 x 15  Exercise 3: SKTC - 10\" x 5 - bilateral  Exercise 4: supine posterior pelvic tilt - 2 x 15  Exercise 5: Nustep - L 5 x 8'  Exercise 6: mini squats - 3 x 10  Exercise 7: standing posterior pelvic tilt - 3 x 10  Exercise 8: MH with IFC e-stim - lumbar x 15  Exercise 9: Manual: prone on table with center elevated - STM lumbar + pelvic distraction                      Assessment   Conditions Requiring Skilled Therapeutic Intervention  Body structures, Functions, Activity limitations: Decreased strength;Decreased high-level IADLs; Increased pain  Assessment: LBP; spondylolisthesis; facet arthropathy; will benefit from PT to help resolve pain and improve functional capacity.   Treatment Diagnosis: LBP; spondylolisthesis; facet arthropathy  Prognosis: Good  Decision Making: Low Complexity  REQUIRES PT FOLLOW UP: Yes  Treatment Initiated : Evaluation, therex, HEP; education on condition, and recommended treatment  Activity Tolerance  Activity Tolerance: Patient Tolerated treatment well         Plan   Plan  Times per week: 2-3 x week  Plan weeks: 6-8 weeks  Current Treatment Recommendations: Strengthening, ROM, Neuromuscular Re-education, Home Exercise Program, Manual Therapy - Joint Manipulation, Manual Therapy - Soft Tissue Mobilization, Modalities, ADL/Self-care Training               Goals  Long term goals  Time Frame for Long term goals : 6-8 weeks  Long term goal 1: Patient to report a 90% decrease in LBP with performance of ADL's, I-ADL's, and routine daily activities. Long term goal 2: Achieve 4+/5 trunk flexion and lower abdominal strength. Long term goal 3: Patient to be independent with HEP. Long term goal 4: Patient be able to peforming standing activities greater than 45 minutes, such as with house hold chores and walking in the community without exacerbation of pain, symptoms. Long term goal 5: Achieve a Back Index score of 12 or less. Therapy Time   Individual Concurrent Group Co-treatment   Time In           Time Out           Minutes                   Mckenzie Canas PT       Certification of Medical Necessity: It will be understood that this treatment plan is certified medically necessary by the documenting therapist and referring physician mentioned in this report. Unless the physician indicated otherwise through written correspondence with our office, all further referrals will act as certification of medical necessity on this treatment plan. Thank you for this referral.  If you have questions regarding this plan of care, please call [phone].           Revisions to this plan (optional):                     Please sign and return this plan to:   FAX: [phone]      Signature:                                 Date:

## 2020-12-17 ASSESSMENT — PAIN DESCRIPTION - ONSET: ONSET: ON-GOING

## 2020-12-17 ASSESSMENT — PAIN DESCRIPTION - LOCATION: LOCATION: BACK

## 2020-12-17 ASSESSMENT — PAIN SCALES - GENERAL: PAINLEVEL_OUTOF10: 2

## 2020-12-17 ASSESSMENT — PAIN DESCRIPTION - DESCRIPTORS: DESCRIPTORS: ACHING;SORE;TIGHTNESS

## 2020-12-17 ASSESSMENT — PAIN DESCRIPTION - FREQUENCY: FREQUENCY: INTERMITTENT

## 2020-12-17 ASSESSMENT — PAIN DESCRIPTION - PAIN TYPE: TYPE: CHRONIC PAIN;ACUTE PAIN

## 2020-12-17 ASSESSMENT — PAIN DESCRIPTION - PROGRESSION: CLINICAL_PROGRESSION: GRADUALLY WORSENING

## 2020-12-17 ASSESSMENT — PAIN DESCRIPTION - ORIENTATION: ORIENTATION: LOWER

## 2020-12-18 ENCOUNTER — HOSPITAL ENCOUNTER (OUTPATIENT)
Dept: PHYSICAL THERAPY | Facility: HOSPITAL | Age: 55
Setting detail: THERAPIES SERIES
Discharge: HOME | End: 2020-12-18
Payer: OTHER GOVERNMENT

## 2020-12-18 PROCEDURE — 97110 THERAPEUTIC EXERCISES: CPT

## 2020-12-18 PROCEDURE — G0283 ELEC STIM OTHER THAN WOUND: HCPCS

## 2020-12-18 PROCEDURE — 97140 MANUAL THERAPY 1/> REGIONS: CPT

## 2020-12-18 NOTE — FLOWSHEET NOTE
Physical Therapy Daily Treatment Note   Date:  2020    TIme In:    1003                  Time Out:   1125    Patient Name:  Mirella Ward    :  1965  MRN: [id number]    Restrictions/Precautions:    Pertinent Medical History:  Medical/Treatment Diagnosis Information:  ·   LBP; spondylolisthesis; facet arthropathy  ·    Insurance/Certification information:    [facility]  Physician Information:    Scooby Armas PA-C   Plan of care signed (Y/N):    Visit# / total visits:   2  /    G-Code (if applicable):      Date / Visit # G-Code Applied:         Progress Note: []  Yes  [x]  No  Next due by: Visit #10      Pain level: 1  /10     Subjective:   Feeling ok this morning; mild pain; no new c/o.      Objective:   Observation:    Test measurements:     Palpation:    Exercises:  Exercise Resistance/Repetitions Other comments   Nustep L5 x 8' 18   Mini squats 3 x 10 18   Standing posterior pelvic tilt 3 x 10 18   LTR X 30 18   Hip isometrics: abd, add 2 x 15 18   SKTC - bilateral 10\" x 5 18   Supine posterior pelvic tilt 2 x 15 18                              Other Therapeutic Activities:      Manual Treatments:  prone on table with center elevated - STM lumbar + pelvic distraction    Modalities:   MH with IFC e-stim - lumbar x 15      Timed Code Treatment Minutes:  72'      Total Treatment Minutes:  80'    Treatment/Activity Tolerance:  [x] Patient tolerated treatment well [] Patient limited by fatigue  [] Patient limited by pain  [] Patient limited by other medical complications  [] Other:     Pain after treatment:    0  /10    Prognosis: [x] Good [] Fair  [] Poor    Patient Requires Follow-up: [x] Yes  [] No    Plan:   [x] Continue per plan of care [] Alter current plan (see comments)  [] Plan of care initiated [] Hold pending MD visit [] Discharge    Plan for Next Session:        Electronically signed by:  Guero Anguiano PT

## 2020-12-21 ENCOUNTER — HOSPITAL ENCOUNTER (OUTPATIENT)
Dept: PHYSICAL THERAPY | Facility: HOSPITAL | Age: 55
Setting detail: THERAPIES SERIES
Discharge: HOME | End: 2020-12-21
Payer: OTHER GOVERNMENT

## 2020-12-21 PROCEDURE — G0283 ELEC STIM OTHER THAN WOUND: HCPCS

## 2020-12-21 PROCEDURE — 97110 THERAPEUTIC EXERCISES: CPT

## 2020-12-21 PROCEDURE — 97140 MANUAL THERAPY 1/> REGIONS: CPT

## 2020-12-21 NOTE — FLOWSHEET NOTE
Physical Therapy Daily Treatment Note   Date:  2020    TIme In:     1132                Time Out:   1487    Patient Name:  Keven Arteaga    :  1965  MRN: [id number]    Restrictions/Precautions:    Pertinent Medical History:  Medical/Treatment Diagnosis Information:  ·   LBP; spondylolisthesis; facet arthropathy     Insurance/Certification information:    [facility]  Physician Information:    Liudmila Sheets PA-C   Plan of care signed (Y/N):    Visit# / total visits:   3 /    G-Code (if applicable):      Date / Visit # G-Code Applied:         Progress Note: []  Yes  [x]  No  Next due by: Visit #10      Pain level: 0/10     Subjective:   Pt reports she is doing okay today. Objective:   Observation:    Test measurements:     Palpation:    Exercises:  Exercise Resistance/Repetitions Other comments   Nustep L5 x 8' 21   Mini squats 3 x 10 21   Standing posterior pelvic tilt 3 x 10 21   LTR X 30 21   Hip isometrics: abd, add 2 x 15 21   SKTC - bilateral 5x15\" 21   Supine posterior pelvic tilt 2 x 15 21                              Other Therapeutic Activities:      Manual Treatments:  prone on table with center elevated - STM lumbar + pelvic distraction    Modalities:   MH with IFC e-stim - lumbar x 15      Timed Code Treatment Minutes:  60      Total Treatment Minutes:  74    Treatment/Activity Tolerance:  [x] Patient tolerated treatment well [] Patient limited by fatigue  [] Patient limited by pain  [] Patient limited by other medical complications  [x] Other:  Pt completed tx with no pain and tolerated manual tx well.     Pain after treatment:    0/10    Prognosis: [x] Good [] Fair  [] Poor    Patient Requires Follow-up: [x] Yes  [] No    Plan:   [x] Continue per plan of care [] Alter current plan (see comments)  [] Plan of care initiated [] Hold pending MD visit [] Discharge    Plan for Next Session:        Electronically signed by:  Marcial Smith PTA

## 2020-12-23 ENCOUNTER — HOSPITAL ENCOUNTER (OUTPATIENT)
Dept: PHYSICAL THERAPY | Facility: HOSPITAL | Age: 55
Setting detail: THERAPIES SERIES
Discharge: HOME | End: 2020-12-23
Payer: OTHER GOVERNMENT

## 2020-12-23 PROCEDURE — 97140 MANUAL THERAPY 1/> REGIONS: CPT

## 2020-12-23 PROCEDURE — G0283 ELEC STIM OTHER THAN WOUND: HCPCS

## 2020-12-23 PROCEDURE — 97110 THERAPEUTIC EXERCISES: CPT

## 2020-12-23 NOTE — FLOWSHEET NOTE
Physical Therapy Daily Treatment Note   Date:  2020    TIme In:     1129                Time Out:   1244    Patient Name:  Kati Shelton    :  1965  MRN: [id number]    Restrictions/Precautions:    Pertinent Medical History:  Medical/Treatment Diagnosis Information:  ·   LBP; spondylolisthesis; facet arthropathy     Insurance/Certification information:    [facility]  Physician Information:    Alena Magana PA-C   Plan of care signed (Y/N):    Visit# / total visits:   4 /    G-Code (if applicable):      Date / Visit # G-Code Applied:         Progress Note: []  Yes  [x]  No  Next due by: Visit #10      Pain level: 0/10     Subjective:   Pt reports: no pain at the moment, back was stiff this morning; good compliance with HEP. Objective:   Observation:    Test measurements:     Palpation:    Exercises:  Exercise Resistance/Repetitions Other comments   Nustep L5 x 8' 23   Mini squats 3 x 10 23   Standing posterior pelvic tilt 3 x 10 23   LTR X 30 23   Hip isometrics: abd, add 2 x 15 23   SKTC - bilateral 5x15\" 23   Supine posterior pelvic tilt 2 x 15 23                              Other Therapeutic Activities:      Manual Treatments:  prone on table with center elevated - STM lumbar + pelvic distraction    Modalities:   MH with IFC e-stim - lumbar x 15      Timed Code Treatment Minutes:  58'      Total Treatment Minutes:  76'    Treatment/Activity Tolerance:  [x] Patient tolerated treatment well [] Patient limited by fatigue  [] Patient limited by pain  [] Patient limited by other medical complications  [x] Other:  Pt completed tx with no pain and tolerated manual tx well.     Pain after treatment:    0/10    Prognosis: [x] Good [] Fair  [] Poor    Patient Requires Follow-up: [x] Yes  [] No    Plan:   [x] Continue per plan of care [] Alter current plan (see comments)  [] Plan of care initiated [] Hold pending MD visit [] Discharge    Plan for Next Session:        Electronically signed by:  Justino Nix Luis Alberto Hahn, PT

## 2020-12-28 ENCOUNTER — HOSPITAL ENCOUNTER (OUTPATIENT)
Dept: PHYSICAL THERAPY | Facility: HOSPITAL | Age: 55
Setting detail: THERAPIES SERIES
Discharge: HOME | End: 2020-12-28
Payer: OTHER GOVERNMENT

## 2020-12-28 PROCEDURE — 97140 MANUAL THERAPY 1/> REGIONS: CPT

## 2020-12-28 PROCEDURE — 97110 THERAPEUTIC EXERCISES: CPT

## 2020-12-28 PROCEDURE — G0283 ELEC STIM OTHER THAN WOUND: HCPCS

## 2020-12-28 NOTE — FLOWSHEET NOTE
Physical Therapy Daily Treatment Note   Date:  2020    TIme In:  1135                   Time Out:   [address] 18    Patient Name:  Vandana Siegel    :  1965  MRN: [id number]    Restrictions/Precautions:    Pertinent Medical History:  Medical/Treatment Diagnosis Information:  ·   LBP; spondylolisthesis; facet arthropathy     Insurance/Certification information:    [facility]  Physician Information:    Daryle Buoy, PA-C   Plan of care signed (Y/N):    Visit# / total visits:   5 /    G-Code (if applicable):      Date / Visit # G-Code Applied:         Progress Note: []  Yes  [x]  No  Next due by: Visit #10      Pain level: 1/10     Subjective:   Pt reports her back is never excruciating but more aggravating. Objective:   Observation:    Test measurements:     Palpation:    Exercises:  Exercise Resistance/Repetitions Other comments   Nustep L5 x 8' 28   Mini squats 3 x 10 28   Standing posterior pelvic tilt 3 x 10 28   LTR X 30 28   Hip isometrics: abd, add 2 x 15 28   SKTC - bilateral 5x15\" 28   Supine posterior pelvic tilt 2 x 15 28                              Other Therapeutic Activities:      Manual Treatments:  prone on table with center elevated - STM lumbar + pelvic distraction    Modalities:   MH with IFC e-stim - lumbar x 15      Timed Code Treatment Minutes:  55      Total Treatment Minutes:  63    Treatment/Activity Tolerance:  [x] Patient tolerated treatment well [] Patient limited by fatigue  [] Patient limited by pain  [] Patient limited by other medical complications  [x] Other:  Pt completed tx with good response to manual tx with mild muscle tension noted in lower lumbar area.     Pain after treatment:    0/10    Prognosis: [x] Good [] Fair  [] Poor    Patient Requires Follow-up: [x] Yes  [] No    Plan:   [x] Continue per plan of care [] Alter current plan (see comments)  [] Plan of care initiated [] Hold pending MD visit [] Discharge    Plan for Next Session:        Electronically signed

## 2020-12-31 ENCOUNTER — HOSPITAL ENCOUNTER (OUTPATIENT)
Dept: PHYSICAL THERAPY | Facility: HOSPITAL | Age: 55
Setting detail: THERAPIES SERIES
Discharge: HOME | End: 2020-12-31
Payer: OTHER GOVERNMENT

## 2020-12-31 PROCEDURE — G0283 ELEC STIM OTHER THAN WOUND: HCPCS

## 2020-12-31 PROCEDURE — 97110 THERAPEUTIC EXERCISES: CPT

## 2020-12-31 PROCEDURE — 97140 MANUAL THERAPY 1/> REGIONS: CPT

## 2020-12-31 NOTE — FLOWSHEET NOTE
Physical Therapy Daily Treatment Note   Date:  2020    TIme In:  1125                 Time Out:   1234    Patient Name:  Devon Hammans    :  1965  MRN: [id number]    Restrictions/Precautions:    Pertinent Medical History:  Medical/Treatment Diagnosis Information:  ·   LBP; spondylolisthesis; facet arthropathy     Insurance/Certification information:    [facility]  Physician Information:    Lindsey Tracey PA-C   Plan of care signed (Y/N):    Visit# / total visits:   6 /    G-Code (if applicable):      Date / Visit # G-Code Applied:         Progress Note: []  Yes  [x]  No  Next due by: Visit #10      Pain level: 0/10     Subjective:    Patient states her back muscles get tight but don't really hurt.      Objective:   Observation:    Test measurements:     Palpation:    Exercises:  Exercise Resistance/Repetitions Other comments   Nustep L5 x 8' 31   Mini squats 3 x 10 31   Standing posterior pelvic tilt 3 x 10 31   LTR X 30 31   Hip isometrics: abd, add 2 x 15 31   SKTC - bilateral 5x15\" 31   Supine posterior pelvic tilt 2 x 15 31                              Other Therapeutic Activities:      Manual Treatments:  prone on table with center elevated - STM lumbar + pelvic distraction    Modalities:   MH with IFC e-stim - lumbar x 15'      Timed Code Treatment Minutes:   58      Total Treatment Minutes:  69    Treatment/Activity Tolerance:  [x] Patient tolerated treatment well [] Patient limited by fatigue  [] Patient limited by pain  [] Patient limited by other medical complications  [] Other:     Pain after treatment:    0/10    Prognosis: [x] Good [] Fair  [] Poor    Patient Requires Follow-up: [x] Yes  [] No    Plan:   [x] Continue per plan of care [] Alter current plan (see comments)  [] Plan of care initiated [] Hold pending MD visit [] Discharge    Plan for Next Session:        Electronically signed by:  Ernestine Samson PTA

## 2021-01-05 ENCOUNTER — HOSPITAL ENCOUNTER (OUTPATIENT)
Dept: PHYSICAL THERAPY | Facility: HOSPITAL | Age: 56
Setting detail: THERAPIES SERIES
Discharge: HOME | End: 2021-01-05
Payer: OTHER GOVERNMENT

## 2021-01-05 PROCEDURE — 97140 MANUAL THERAPY 1/> REGIONS: CPT

## 2021-01-05 PROCEDURE — 97110 THERAPEUTIC EXERCISES: CPT

## 2021-01-05 PROCEDURE — G0283 ELEC STIM OTHER THAN WOUND: HCPCS

## 2021-01-05 NOTE — FLOWSHEET NOTE
Physical Therapy Daily Treatment Note   Date:  2021    TIme In: 1406                 Time Out:   6864    Patient Name:  Braulio Coleman    :  1965  MRN: [id number]    Restrictions/Precautions:    Pertinent Medical History:  Medical/Treatment Diagnosis Information:  ·   LBP; spondylolisthesis; facet arthropathy     Insurance/Certification information:    [employer]  Physician Information:    Lesly Caldera PA-C   Plan of care signed (Y/N):    Visit# / total visits:   7 /    G-Code (if applicable):      Date / Visit # G-Code Applied:         Progress Note: []  Yes  [x]  No  Next due by: Visit #10      Pain level: 0/10     Subjective:    Patient reports her back really isn't bothering her , she is coming more for prevention of back injury and to strengthen her core. Objective:   Observation:    Test measurements:     Palpation:    Exercises:  Exercise Resistance/Repetitions Other comments   Nustep L5 x 8' 5   Mini squats 3 x 10 5   Standing posterior pelvic tilt 3 x 10 5   LTR X 30 5   Hip isometrics: abd, add 2 x 15 5   SKTC - bilateral 5x15\" 5   Supine posterior pelvic tilt 2 x 15 5                              Other Therapeutic Activities:      Manual Treatments:  prone on table with center elevated - STM lumbar + pelvic distraction x10'. Modalities:   MH with IFC e-stim - lumbar x 15'       Timed Code Treatment Minutes:   60      Total Treatment Minutes:  65    Treatment/Activity Tolerance:  [x] Patient tolerated treatment well [] Patient limited by fatigue  [] Patient limited by pain  [] Patient limited by other medical complications  [x] Other:  Pt completed tx with no pain and minimal muscle tension noted in lumbar region.     Pain after treatment:    0/10    Prognosis: [x] Good [] Fair  [] Poor    Patient Requires Follow-up: [x] Yes  [] No    Plan:   [x] Continue per plan of care [] Alter current plan (see comments)  [] Plan of care initiated [] Hold pending MD visit [] Discharge    Plan for

## 2021-01-07 ENCOUNTER — HOSPITAL ENCOUNTER (OUTPATIENT)
Dept: PHYSICAL THERAPY | Facility: HOSPITAL | Age: 56
Setting detail: THERAPIES SERIES
Discharge: HOME | End: 2021-01-07
Payer: OTHER GOVERNMENT

## 2021-01-07 PROCEDURE — 97140 MANUAL THERAPY 1/> REGIONS: CPT

## 2021-01-07 PROCEDURE — 97110 THERAPEUTIC EXERCISES: CPT

## 2021-01-07 PROCEDURE — G0283 ELEC STIM OTHER THAN WOUND: HCPCS

## 2021-01-07 NOTE — FLOWSHEET NOTE
Physical Therapy Daily Treatment Note   Date:  2021    TIme In:   1013                 Time Out:   [address]    Patient Name:  Jeovany Nicholson    :  1965  MRN: [id number]    Restrictions/Precautions:    Pertinent Medical History:  Medical/Treatment Diagnosis Information:  ·   LBP; spondylolisthesis; facet arthropathy     Insurance/Certification information:    [facility]  Physician Information:    Bunny Yarbrough PA-C   Plan of care signed (Y/N):    Visit# / total visits:   8 /    G-Code (if applicable):      Date / Visit # G-Code Applied:         Progress Note: []  Yes  [x]  No  Next due by: Visit #10      Pain level: 0/10     Subjective:    Patient reports: back pain is off/on throughout the day. Objective:   Observation:    Test measurements:     Palpation:    Exercises:  Exercise Resistance/Repetitions Other comments   Nustep L5 x 8' 7   Mini squats 3 x 10 7   Standing posterior pelvic tilt 3 x 10 7   LTR X 30 7   Hip isometrics: abd, add 2 x 15 7   SKTC - bilateral 5x15\" 7   Supine posterior pelvic tilt 2 x 15 7        Level 1 abd crunch *    Knee plank  *    1/2 bridge with leg kick *           Other Therapeutic Activities:      Manual Treatments:  prone on table with center elevated - STM lumbar + pelvic distraction x10'. Modalities:   MH with IFC e-stim - lumbar x 15'       Timed Code Treatment Minutes:   64      Total Treatment Minutes:  80'    Treatment/Activity Tolerance:  [x] Patient tolerated treatment well [] Patient limited by fatigue  [] Patient limited by pain  [] Patient limited by other medical complications  [x] Other:  Pt completed tx with no pain and minimal muscle tension noted in lumbar region.     Pain after treatment:    0/10    Prognosis: [x] Good [] Fair  [] Poor    Patient Requires Follow-up: [x] Yes  [] No    Plan:   [x] Continue per plan of care [] Alter current plan (see comments)  [] Plan of care initiated [] Hold pending MD visit [] Discharge    Plan for Next Session: Electronically signed by:  Elma Mccullough, PT

## 2021-01-12 ENCOUNTER — HOSPITAL ENCOUNTER (OUTPATIENT)
Dept: PHYSICAL THERAPY | Facility: HOSPITAL | Age: 56
Setting detail: THERAPIES SERIES
Discharge: HOME | End: 2021-01-12
Payer: OTHER GOVERNMENT

## 2021-01-12 PROCEDURE — 97110 THERAPEUTIC EXERCISES: CPT

## 2021-01-12 PROCEDURE — 97140 MANUAL THERAPY 1/> REGIONS: CPT

## 2021-01-12 PROCEDURE — G0283 ELEC STIM OTHER THAN WOUND: HCPCS

## 2021-01-12 NOTE — FLOWSHEET NOTE
Physical Therapy Daily Treatment Note   Date:  2021    TIme In:   1046                 Time Out:   [address] [facility]    Patient Name:  Isidro Myers    :  1965  MRN: [id number]    Restrictions/Precautions:    Pertinent Medical History:  Medical/Treatment Diagnosis Information:  ·   LBP; spondylolisthesis; facet arthropathy     Insurance/Certification information:      Physician Information:    Princess Mcnally PA-C   Plan of care signed (Y/N):    Visit# / total visits:   9 /    G-Code (if applicable):      Date / Visit # G-Code Applied:         Progress Note: []  Yes  [x]  No  Next due by: Visit #10      Pain level: 1 /10     Subjective:    Patient reports: back pain is off/on throughout the day. Objective:   Observation:    Test measurements:      Palpation:    Exercises:  Exercise Resistance/Repetitions Other comments   Nustep L5 x 8' 12   Mini squats 3 x 10 12   Standing posterior pelvic tilt 3 x 10 12   LTR X 30 12   Hip isometrics: abd, add 2 x 15 12   SKTC - bilateral 5x15\" 12   Supine posterior pelvic tilt 2 x 15 12        Level 1 abd crunch * 3 x 10 12   Knee plank  * 10\" x 10 12   1/2 bridge with leg kick * 10 x each 12          Other Therapeutic Activities:      Manual Treatments:  prone on table with center elevated - STM lumbar + pelvic distraction x10'. Modalities:   MH with IFC e-stim - lumbar x 15'       Timed Code Treatment Minutes:   70      Total Treatment Minutes:  80'    Treatment/Activity Tolerance:  [x] Patient tolerated treatment well [] Patient limited by fatigue  [] Patient limited by pain  [] Patient limited by other medical complications  [x] Other:  Pt completed tx with no pain and minimal muscle tension noted in lumbar region.     Pain after treatment:    0/10    Prognosis: [x] Good [] Fair  [] Poor    Goals  Long term goals  Time Frame for Long term goals : 6-8 weeks  Long term goal 1: Patient to report a 90% decrease in LBP with performance of ADL's, I-ADL's, and routine daily activities. Long term goal 2: Achieve 4+/5 trunk flexion and lower abdominal strength. Long term goal 3: Patient to be independent with HEP. Long term goal 4: Patient be able to peforming standing activities greater than 45 minutes, such as with house hold chores and walking in the community without exacerbation of pain, symptoms. Long term goal 5: Achieve a Back Index score of 12 or less.       Patient Requires Follow-up: [x] Yes  [] No    Plan:   [x] Continue per plan of care [] Alter current plan (see comments)  [] Plan of care initiated [] Hold pending MD visit [] Discharge    Plan for Next Session:        Electronically signed by:  Sarah Larose PT

## 2021-01-14 ENCOUNTER — HOSPITAL ENCOUNTER (OUTPATIENT)
Dept: PHYSICAL THERAPY | Facility: HOSPITAL | Age: 56
Setting detail: THERAPIES SERIES
Discharge: HOME | End: 2021-01-14
Payer: OTHER GOVERNMENT

## 2021-01-14 PROCEDURE — 97140 MANUAL THERAPY 1/> REGIONS: CPT

## 2021-01-14 PROCEDURE — 97110 THERAPEUTIC EXERCISES: CPT

## 2021-01-14 PROCEDURE — G0283 ELEC STIM OTHER THAN WOUND: HCPCS

## 2021-01-14 NOTE — FLOWSHEET NOTE
Physical Therapy Reassessment Note   Date:  2021    TIme In:   1112                 Time Out:   [address]    Patient Name:  Mando French    :  1965  MRN: [id number]    Restrictions/Precautions:    Pertinent Medical History:  Medical/Treatment Diagnosis Information:  ·   LBP; spondylolisthesis; facet arthropathy     Insurance/Certification information:    [employer]  Physician Information:    Roverto Mora PA-C   Plan of care signed (Y/N):    Visit# / total visits:   10 /    G-Code (if applicable):      Date / Visit # G-Code Applied:         Progress Note: [x]  Yes  []  No  Next due by: Visit #10      Pain level: 1 /10     Subjective:    Patient reports: back pain is off/on throughout the day; seems to have worst pain with prolonged standing     Objective:   Observation:    Test measurements:      Palpation:    Exercises:  Exercise Resistance/Repetitions Other comments   Nustep L5 x 8' 14   Mini squats 3 x 10 14   Standing posterior pelvic tilt 3 x 10 14   LTR X 30 14   Hip isometrics: abd, add 2 x 15 14   SKTC - bilateral 5x15\" 14   Supine posterior pelvic tilt 2 x 15 14        Level 1 abd crunch * 3 x 10 14   Knee plank  * 10\" x 10 14   1/2 bridge with leg kick * 10 x each 14          Other Therapeutic Activities:      Manual Treatments:  prone on table with center elevated - STM lumbar + pelvic distraction x10'. Modalities:   MH with IFC e-stim - lumbar x 15'       Timed Code Treatment Minutes:   64      Total Treatment Minutes:  68'    Treatment/Activity Tolerance:  [x] Patient tolerated treatment well [] Patient limited by fatigue  [] Patient limited by pain  [] Patient limited by other medical complications  [x] Other:  Pt completed tx with no pain and minimal muscle tension noted in lumbar region.     Pain after treatment:    0/10    Prognosis: [x] Good [] Fair  [] Poor    Goals  Long term goals  Time Frame for Long term goals : 6-8 weeks  Long term goal 1: Patient to report a 90% decrease in LBP with performance of ADL's, I-ADL's, and routine daily activities. - not met but improving  Long term goal 2: Achieve 4+/5 trunk flexion and lower abdominal strength. - not met but improving  Long term goal 3: Patient to be independent with HEP. - met  Long term goal 4: Patient be able to peforming standing activities greater than 45 minutes, such as with house hold chores and walking in the community without exacerbation of pain, symptoms. - not met but improving  Long term goal 5: Achieve a Back Index score of 12 or less. - not met but improving    Patient is responding positively to current treatment; pain intensity and frequency are less; has met or is progressing toward her goals; able to advance lumbar stabilization exercises.       Patient Requires Follow-up: [x] Yes  [] No    Plan:   [x] Continue per plan of care [] Alter current plan (see comments)  [] Plan of care initiated [] Hold pending MD visit [] Discharge    Plan for Next Session:        Electronically signed by:  Omar De León PT

## 2021-01-19 ENCOUNTER — HOSPITAL ENCOUNTER (OUTPATIENT)
Dept: PHYSICAL THERAPY | Facility: HOSPITAL | Age: 56
Setting detail: THERAPIES SERIES
Discharge: HOME | End: 2021-01-19
Payer: OTHER GOVERNMENT

## 2021-01-19 PROCEDURE — 97110 THERAPEUTIC EXERCISES: CPT

## 2021-01-19 PROCEDURE — 97140 MANUAL THERAPY 1/> REGIONS: CPT

## 2021-01-19 PROCEDURE — G0283 ELEC STIM OTHER THAN WOUND: HCPCS

## 2021-01-19 NOTE — FLOWSHEET NOTE
Physical Therapy Treatment Note   Date:  2021    TIme In:   4830                 Time Out:   [address]    Patient Name:  Jessica Sharif    :  1965  MRN: [id number]    Restrictions/Precautions:    Pertinent Medical History:  Medical/Treatment Diagnosis Information:  ·   LBP; spondylolisthesis; facet arthropathy     Insurance/Certification information:    [facility]  Physician Information:    Narendra Morris PA-C   Plan of care signed (Y/N):    Visit# / total visits:   11 /    G-Code (if applicable):      Date / Visit # G-Code Applied:         Progress Note: []  Yes  [x]  No  Next due by: Visit #10      Pain level: 0 /10     Subjective:    Patient reports: feeling ok today, no pain at the present time; good compliance with HEP; no new c/o. Objective:   Observation:    Test measurements:      Palpation:    Exercises:  Exercise Resistance/Repetitions Other comments   Nustep L5 x 8' 19   Mini squats 3 x 10 19   Standing posterior pelvic tilt 3 x 10 19   LTR X 30 19   Hip isometrics: abd, add 2 x 15 19   SKTC - bilateral 5x15\" 19   Supine posterior pelvic tilt 2 x 15 19        Level 1 abd crunch * 3 x 10 19   Knee plank  * 10\" x 10 19   1/2 bridge with leg kick * 10 x each 19          Other Therapeutic Activities:      Manual Treatments:  prone on table with center elevated - STM lumbar + pelvic distraction x10'. Modalities:   MH with IFC e-stim - lumbar x 15'       Timed Code Treatment Minutes:   58'      Total Treatment Minutes:    68'    Treatment/Activity Tolerance:  [x] Patient tolerated treatment well [] Patient limited by fatigue  [] Patient limited by pain  [] Patient limited by other medical complications  [x] Other:  Pt completed tx with no pain and minimal muscle tension noted in lumbar region.     Pain after treatment:    0/10    Prognosis: [x] Good [] Fair  [] Poor    Goals  Long term goals  Time Frame for Long term goals : 6-8 weeks  Long term goal 1: Patient to report a 90% decrease in LBP with performance of ADL's, I-ADL's, and routine daily activities. - not met but improving  Long term goal 2: Achieve 4+/5 trunk flexion and lower abdominal strength. - not met but improving  Long term goal 3: Patient to be independent with HEP. - met  Long term goal 4: Patient be able to peforming standing activities greater than 45 minutes, such as with house hold chores and walking in the community without exacerbation of pain, symptoms. - not met but improving  Long term goal 5: Achieve a Back Index score of 12 or less. - not met but improving    Patient is responding positively to current treatment; pain intensity and frequency are less; has met or is progressing toward her goals; able to advance lumbar stabilization exercises.       Patient Requires Follow-up: [x] Yes  [] No    Plan:   [x] Continue per plan of care [] Alter current plan (see comments)  [] Plan of care initiated [] Hold pending MD visit [] Discharge    Plan for Next Session:        Electronically signed by:  Susan Pack PT

## 2021-01-20 ENCOUNTER — OFFICE VISIT (OUTPATIENT)
Dept: BARIATRICS/WEIGHT MGMT | Facility: CLINIC | Age: 56
End: 2021-01-20

## 2021-01-20 VITALS
TEMPERATURE: 97.8 F | SYSTOLIC BLOOD PRESSURE: 115 MMHG | HEIGHT: 60 IN | HEART RATE: 66 BPM | BODY MASS INDEX: 32.59 KG/M2 | WEIGHT: 166 LBS | OXYGEN SATURATION: 99 % | DIASTOLIC BLOOD PRESSURE: 74 MMHG | RESPIRATION RATE: 18 BRPM

## 2021-01-20 DIAGNOSIS — E55.9 HYPOVITAMINOSIS D: ICD-10-CM

## 2021-01-20 DIAGNOSIS — Z90.3 POSTGASTRECTOMY MALABSORPTION: ICD-10-CM

## 2021-01-20 DIAGNOSIS — Z98.84 STATUS POST BARIATRIC SURGERY: Primary | ICD-10-CM

## 2021-01-20 DIAGNOSIS — Z13.0 SCREENING, IRON DEFICIENCY ANEMIA: ICD-10-CM

## 2021-01-20 DIAGNOSIS — E66.9 OBESITY, CLASS I, BMI 30-34.9: ICD-10-CM

## 2021-01-20 DIAGNOSIS — Z13.21 MALNUTRITION SCREEN: ICD-10-CM

## 2021-01-20 DIAGNOSIS — R53.83 FATIGUE, UNSPECIFIED TYPE: ICD-10-CM

## 2021-01-20 DIAGNOSIS — K91.2 POSTGASTRECTOMY MALABSORPTION: ICD-10-CM

## 2021-01-20 PROCEDURE — 99024 POSTOP FOLLOW-UP VISIT: CPT | Performed by: PHYSICIAN ASSISTANT

## 2021-01-20 RX ORDER — FOLIC ACID 1 MG/1
TABLET ORAL
COMMUNITY
Start: 2021-01-02

## 2021-01-20 RX ORDER — URSODIOL 300 MG/1
300 CAPSULE ORAL DAILY
COMMUNITY
Start: 2021-01-19 | End: 2021-09-29

## 2021-01-20 RX ORDER — OXYMETAZOLINE HYDROCHLORIDE 1 G/100G
CREAM TOPICAL
COMMUNITY
Start: 2021-01-19 | End: 2022-10-26

## 2021-01-20 RX ORDER — TRIAMCINOLONE ACETONIDE 1 MG/G
CREAM TOPICAL
COMMUNITY
Start: 2021-01-06 | End: 2021-09-29
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
## 2021-01-21 ENCOUNTER — HOSPITAL ENCOUNTER (OUTPATIENT)
Dept: PHYSICAL THERAPY | Facility: HOSPITAL | Age: 56
Setting detail: THERAPIES SERIES
Discharge: HOME | End: 2021-01-21
Payer: OTHER GOVERNMENT

## 2021-01-21 PROCEDURE — 97110 THERAPEUTIC EXERCISES: CPT

## 2021-01-21 PROCEDURE — 97140 MANUAL THERAPY 1/> REGIONS: CPT

## 2021-01-21 NOTE — FLOWSHEET NOTE
Physical Therapy Treatment Note   Date:  2021    TIme In:   2850                 Time Out:   1014    Patient Name:  Chele Carvalho    :  1965  MRN: [id number]    Restrictions/Precautions:    Pertinent Medical History:  Medical/Treatment Diagnosis Information:  ·   LBP; spondylolisthesis; facet arthropathy     Insurance/Certification information:    [facility]  Physician Information:    Brittney Thomas PA-C   Plan of care signed (Y/N):    Visit# / total visits:   12 /    G-Code (if applicable):      Date / Visit # G-Code Applied:         Progress Note: []  Yes  [x]  No  Next due by: Visit #10      Pain level: 0 /10     Subjective:    Patient reports: feeling ok today, no pain at the present time; good compliance with HEP; no new c/o; has MD f/u today. Objective:   Observation:    Test measurements:      Palpation:    Exercises:  Exercise Resistance/Repetitions Other comments   Nustep L5 x 8' 21   Mini squats 3 x 10 21   Standing posterior pelvic tilt 3 x 10 21   LTR X 30 21   Hip isometrics: abd, add 2 x 15 21   SKTC - bilateral 5x15\" 21   Supine posterior pelvic tilt 2 x 15 21        Level 1 abd crunch * 3 x 10 21   Knee plank  * 10\" x 10 21   1/2 bridge with leg kick * 10 x each 21                    Other Therapeutic Activities:      Manual Treatments:  prone on table with center elevated - STM lumbar + pelvic distraction x 10'. Modalities:   MH with IFC e-stim - lumbar x 15' - HOLD today       Timed Code Treatment Minutes:  47'      Total Treatment Minutes:    64'    Treatment/Activity Tolerance:  [x] Patient tolerated treatment well [] Patient limited by fatigue  [] Patient limited by pain  [] Patient limited by other medical complications  [x] Other:  Pt completed tx with no pain and minimal muscle tension noted in lumbar region.     Pain after treatment:    0/10    Prognosis: [x] Good [] Fair  [] Poor    Goals  Long term goals  Time Frame for Long term goals : 6-8 weeks  Long term goal 1: Patient to report a 90% decrease in LBP with performance of ADL's, I-ADL's, and routine daily activities. - not met but improving  Long term goal 2: Achieve 4+/5 trunk flexion and lower abdominal strength. - not met but improving  Long term goal 3: Patient to be independent with HEP. - met  Long term goal 4: Patient be able to peforming standing activities greater than 45 minutes, such as with house hold chores and walking in the community without exacerbation of pain, symptoms. - not met but improving  Long term goal 5: Achieve a Back Index score of 12 or less. - not met but improving    Patient is responding positively to current treatment; pain intensity and frequency are less; has met or is progressing toward her goals; able to advance lumbar stabilization exercises.       Patient Requires Follow-up: [x] Yes  [] No    Plan:   [x] Continue per plan of care [] Alter current plan (see comments)  [] Plan of care initiated [] Hold pending MD visit [] Discharge    Plan for Next Session:        Electronically signed by:  Yara Segal, PT

## 2021-01-25 ENCOUNTER — HOSPITAL ENCOUNTER (OUTPATIENT)
Dept: PHYSICAL THERAPY | Facility: HOSPITAL | Age: 56
Setting detail: THERAPIES SERIES
Discharge: HOME | End: 2021-01-25
Payer: OTHER GOVERNMENT

## 2021-01-25 PROCEDURE — 97140 MANUAL THERAPY 1/> REGIONS: CPT

## 2021-01-25 PROCEDURE — 97110 THERAPEUTIC EXERCISES: CPT

## 2021-01-25 PROCEDURE — G0283 ELEC STIM OTHER THAN WOUND: HCPCS

## 2021-01-25 NOTE — FLOWSHEET NOTE
Physical Therapy Treatment Note   Date:  2021    TIme In:   1108                 Time Out:   4574    Patient Name:  Diana Jaime    :  1965  MRN: [id number]    Restrictions/Precautions:    Pertinent Medical History:  Medical/Treatment Diagnosis Information:  ·   LBP; spondylolisthesis; facet arthropathy     Insurance/Certification information:    [facility]  Physician Information:    Lupis Roberts PA-C   Plan of care signed (Y/N):    Visit# / total visits:   15 /    G-Code (if applicable):      Date / Visit # G-Code Applied:         Progress Note: []  Yes  [x]  No  Next due by: Visit #10      Pain level: 0 /10     Subjective:    Patient reports: feeling ok today, no pain at the present time; good compliance with HEP; no new c/o. Objective:   Observation:    Test measurements:      Palpation:    Exercises:  Exercise Resistance/Repetitions Other comments   Nustep L5 x 8' 25   Mini squats 3 x 10 25   Standing posterior pelvic tilt 3 x 10 25   LTR X 30 25   Hip isometrics: abd, add 2 x 15 25   SKTC - bilateral 5x15\" 25   Supine posterior pelvic tilt 2 x 15 25        Level 1 abd crunch * 3 x 10 25   Knee plank  * 10\" x 10 25   1/2 bridge with leg kick * 10 x each 25                    Other Therapeutic Activities:      Manual Treatments:  prone on table with center elevated - STM lumbar + pelvic distraction x 10'. Modalities:   MH with IFC e-stim - lumbar x 15' - HOLD today       Timed Code Treatment Minutes:  58'      Total Treatment Minutes:   67 '    Treatment/Activity Tolerance:  [x] Patient tolerated treatment well [] Patient limited by fatigue  [] Patient limited by pain  [] Patient limited by other medical complications  [x] Other:  Pt completed tx with no pain and minimal muscle tension noted in lumbar region.     Pain after treatment:    0/10    Prognosis: [x] Good [] Fair  [] Poor    Goals  Long term goals  Time Frame for Long term goals : 6-8 weeks  Long term goal 1: Patient to report a 90% decrease in LBP with performance of ADL's, I-ADL's, and routine daily activities. - not met but improving  Long term goal 2: Achieve 4+/5 trunk flexion and lower abdominal strength. - not met but improving  Long term goal 3: Patient to be independent with HEP. - met  Long term goal 4: Patient be able to peforming standing activities greater than 45 minutes, such as with house hold chores and walking in the community without exacerbation of pain, symptoms. - not met but improving  Long term goal 5: Achieve a Back Index score of 12 or less. - not met but improving    Patient is responding positively to current treatment; pain intensity and frequency are less; has met or is progressing toward her goals; able to advance lumbar stabilization exercises.       Patient Requires Follow-up: [x] Yes  [] No    Plan:   [x] Continue per plan of care [] Alter current plan (see comments)  [] Plan of care initiated [] Hold pending MD visit [] Discharge    Plan for Next Session:        Electronically signed by:  Salazar Peterson PT

## 2021-01-26 LAB
25(OH)D3+25(OH)D2 SERPL-MCNC: 57.5 NG/ML (ref 30–100)
ALBUMIN SERPL-MCNC: 4.2 G/DL (ref 3.8–4.9)
ALBUMIN/GLOB SERPL: 1.7 {RATIO} (ref 1.2–2.2)
ALP SERPL-CCNC: 81 IU/L (ref 39–117)
ALT SERPL-CCNC: 15 IU/L (ref 0–32)
AST SERPL-CCNC: 20 IU/L (ref 0–40)
BASOPHILS # BLD AUTO: 0.1 X10E3/UL (ref 0–0.2)
BASOPHILS NFR BLD AUTO: 1 %
BILIRUB SERPL-MCNC: 0.2 MG/DL (ref 0–1.2)
BUN SERPL-MCNC: 23 MG/DL (ref 6–24)
BUN/CREAT SERPL: 40 (ref 9–23)
CALCIUM SERPL-MCNC: 9.3 MG/DL (ref 8.7–10.2)
CHLORIDE SERPL-SCNC: 105 MMOL/L (ref 96–106)
CO2 SERPL-SCNC: 19 MMOL/L (ref 20–29)
CREAT SERPL-MCNC: 0.57 MG/DL (ref 0.57–1)
EOSINOPHIL # BLD AUTO: 0.1 X10E3/UL (ref 0–0.4)
EOSINOPHIL NFR BLD AUTO: 1 %
ERYTHROCYTE [DISTWIDTH] IN BLOOD BY AUTOMATED COUNT: 12.7 % (ref 11.7–15.4)
FERRITIN SERPL-MCNC: 153 NG/ML (ref 15–150)
FOLATE SERPL-MCNC: 11.4 NG/ML
GLOBULIN SER CALC-MCNC: 2.5 G/DL (ref 1.5–4.5)
GLUCOSE SERPL-MCNC: 76 MG/DL (ref 65–99)
HCT VFR BLD AUTO: 42.5 % (ref 34–46.6)
HGB BLD-MCNC: 14.7 G/DL (ref 11.1–15.9)
IMM GRANULOCYTES # BLD AUTO: 0 X10E3/UL (ref 0–0.1)
IMM GRANULOCYTES NFR BLD AUTO: 0 %
IRON SERPL-MCNC: 101 UG/DL (ref 27–159)
LYMPHOCYTES # BLD AUTO: 1.6 X10E3/UL (ref 0.7–3.1)
LYMPHOCYTES NFR BLD AUTO: 23 %
Lab: NORMAL
MCH RBC QN AUTO: 32 PG (ref 26.6–33)
MCHC RBC AUTO-ENTMCNC: 34.6 G/DL (ref 31.5–35.7)
MCV RBC AUTO: 93 FL (ref 79–97)
METHYLMALONATE SERPL-SCNC: 117 NMOL/L (ref 0–378)
MONOCYTES # BLD AUTO: 0.6 X10E3/UL (ref 0.1–0.9)
MONOCYTES NFR BLD AUTO: 9 %
NEUTROPHILS # BLD AUTO: 4.6 X10E3/UL (ref 1.4–7)
NEUTROPHILS NFR BLD AUTO: 66 %
PLATELET # BLD AUTO: 249 X10E3/UL (ref 150–450)
POTASSIUM SERPL-SCNC: 4.2 MMOL/L (ref 3.5–5.2)
PREALB SERPL-MCNC: 23 MG/DL (ref 10–36)
PROT SERPL-MCNC: 6.7 G/DL (ref 6–8.5)
RBC # BLD AUTO: 4.59 X10E6/UL (ref 3.77–5.28)
SODIUM SERPL-SCNC: 141 MMOL/L (ref 134–144)
VIT B1 BLD-SCNC: 184.7 NMOL/L (ref 66.5–200)
WBC # BLD AUTO: 7 X10E3/UL (ref 3.4–10.8)

## 2021-01-28 ENCOUNTER — HOSPITAL ENCOUNTER (OUTPATIENT)
Dept: PHYSICAL THERAPY | Facility: HOSPITAL | Age: 56
Setting detail: THERAPIES SERIES
Discharge: HOME | End: 2021-01-28
Payer: OTHER GOVERNMENT

## 2021-01-28 PROCEDURE — G0283 ELEC STIM OTHER THAN WOUND: HCPCS

## 2021-01-28 PROCEDURE — 97140 MANUAL THERAPY 1/> REGIONS: CPT

## 2021-01-28 PROCEDURE — 97110 THERAPEUTIC EXERCISES: CPT

## 2021-01-28 NOTE — FLOWSHEET NOTE
Physical Therapy Treatment Note   Date:  2021    TIme In:   1108                 Time Out:   1234    Patient Name:  Lauren Gaspar    :  1965  MRN: [id number]    Restrictions/Precautions:    Pertinent Medical History:  Medical/Treatment Diagnosis Information:  ·   LBP; spondylolisthesis; facet arthropathy     Insurance/Certification information:    [facility]  Physician Information:    Venkatesh Rojas PA-C   Plan of care signed (Y/N):    Visit# / total visits:   15 /    G-Code (if applicable):      Date / Visit # G-Code Applied:         Progress Note: []  Yes  [x]  No  Next due by: Visit #10      Pain level: 0 /10     Subjective:    Patient reports: feeling ok today, no pain at the present time; good compliance with HEP; no new c/o. Objective:   Observation:    Test measurements:      Palpation:    Exercises:  Exercise Resistance/Repetitions Other comments   Nustep L5 x 8' 28   Mini squats 3 x 10 28   Standing posterior pelvic tilt 3 x 10 28   LTR X 30 28   Hip isometrics: abd, add 2 x 15 28   SKTC - bilateral 5x15\" 28   Supine posterior pelvic tilt 2 x 15 28        Level 1 abd crunch * 3 x 10 28   Knee plank  * 10\" x 10 28   1/2 bridge with leg kick * 10 x each 28                    Other Therapeutic Activities:      Manual Treatments:  prone on table with center elevated - STM lumbar + pelvic distraction x 10'. Modalities:   MH with IFC e-stim - lumbar x 15'       Timed Code Treatment Minutes:  58'      Total Treatment Minutes:   80 '    Treatment/Activity Tolerance:  [x] Patient tolerated treatment well [] Patient limited by fatigue  [] Patient limited by pain  [] Patient limited by other medical complications  [x] Other:  Pt completed tx with no pain and minimal muscle tension noted in lumbar region.     Pain after treatment:    0/10    Prognosis: [x] Good [] Fair  [] Poor    Goals  Long term goals  Time Frame for Long term goals : 6-8 weeks  Long term goal 1: Patient to report a 90% decrease in LBP with performance of ADL's, I-ADL's, and routine daily activities. - not met but improving  Long term goal 2: Achieve 4+/5 trunk flexion and lower abdominal strength. - not met but improving  Long term goal 3: Patient to be independent with HEP. - met  Long term goal 4: Patient be able to peforming standing activities greater than 45 minutes, such as with house hold chores and walking in the community without exacerbation of pain, symptoms. - not met but improving  Long term goal 5: Achieve a Back Index score of 12 or less. - not met but improving    Patient is responding positively to current treatment; pain intensity and frequency are less; has met or is progressing toward her goals; able to advance lumbar stabilization exercises.       Patient Requires Follow-up: [x] Yes  [] No    Plan:   [x] Continue per plan of care [] Alter current plan (see comments)  [] Plan of care initiated [] Hold pending MD visit [] Discharge    Plan for Next Session:        Electronically signed by:  Javad Hirsch PT

## 2021-02-01 ENCOUNTER — HOSPITAL ENCOUNTER (OUTPATIENT)
Dept: PHYSICAL THERAPY | Facility: HOSPITAL | Age: 56
Setting detail: THERAPIES SERIES
End: 2021-02-01
Payer: OTHER GOVERNMENT

## 2021-02-04 RX ORDER — OMEPRAZOLE 40 MG/1
40 CAPSULE, DELAYED RELEASE ORAL DAILY
Qty: 60 CAPSULE | Refills: 1 | Status: SHIPPED | OUTPATIENT
Start: 2021-02-04 | End: 2021-04-05

## 2021-02-05 ENCOUNTER — HOSPITAL ENCOUNTER (OUTPATIENT)
Dept: MRI IMAGING | Facility: HOSPITAL | Age: 56
Discharge: HOME | End: 2021-02-05
Payer: OTHER GOVERNMENT

## 2021-02-05 DIAGNOSIS — M43.16 SPONDYLOLISTHESIS OF LUMBAR REGION: ICD-10-CM

## 2021-02-05 PROCEDURE — 72148 MRI LUMBAR SPINE W/O DYE: CPT

## 2021-02-11 ENCOUNTER — HOSPITAL ENCOUNTER (OUTPATIENT)
Dept: PHYSICAL THERAPY | Facility: HOSPITAL | Age: 56
Setting detail: THERAPIES SERIES
End: 2021-02-11
Payer: OTHER GOVERNMENT

## 2021-02-24 ENCOUNTER — HOSPITAL ENCOUNTER (OUTPATIENT)
Dept: PHYSICAL THERAPY | Facility: HOSPITAL | Age: 56
Setting detail: THERAPIES SERIES
Discharge: HOME | End: 2021-02-24
Payer: OTHER GOVERNMENT

## 2021-02-24 PROCEDURE — G0283 ELEC STIM OTHER THAN WOUND: HCPCS

## 2021-02-24 PROCEDURE — 97140 MANUAL THERAPY 1/> REGIONS: CPT

## 2021-02-24 PROCEDURE — 97110 THERAPEUTIC EXERCISES: CPT

## 2021-02-24 NOTE — FLOWSHEET NOTE
Physical Therapy Treatment Note/Discharge Summary   Date:  2021    TIme In:   1039              Time Out:  5577    Patient Name:  Alek Orta    :  1965  MRN: [id number]    Restrictions/Precautions:    Pertinent Medical History:  Medical/Treatment Diagnosis Information:  ·   LBP; spondylolisthesis; facet arthropathy     Insurance/Certification information:    [facility]  Physician Information:    Maricruz Putnam PA-C   Plan of care signed (Y/N):    Visit# / total visits:   15/    G-Code (if applicable):      Date / Visit # G-Code Applied:         Progress Note: []  Yes  [x]  No  Next due by: Visit #10      Pain level: 1/10     Subjective:    Patient reports she has improved considerably but she still has problems if she is standing to clean or do activity for too long. She expressed that she can tell a difference when she hasn't done her HEP as well. Objective:   Observation:    Test measurements:   Back Index: 24.4%, MMT: trunk flex: 5/5, low abs: 4+/5.  Palpation:    Exercises:  Exercise Resistance/Repetitions Other comments   Nustep L5 x 8' 24   Mini squats 3 x 10 24   Standing posterior pelvic tilt 3 x 10 24   LTR X 30 24   Hip isometrics: abd, add 2 x 15 24   SKTC - bilateral 5x15\" 24   Supine posterior pelvic tilt 2 x 15 24        Level 1 abd crunch 3x 10 24   Knee plank  10\" x 10 24   1/2 bridge with leg kick 10 x each 24                    Other Therapeutic Activities:  Pt given HEP and verbalized understanding. Manual Treatments:  prone on table with center elevated - STM lumbar + pelvic distraction x 15'. Modalities:    with IFC e-stim - lumbar x 15'       Timed Code Treatment Minutes: 70      Total Treatment Minutes:   91    Treatment/Activity Tolerance:  [x] Patient tolerated treatment well [] Patient limited by fatigue  [] Patient limited by pain  [] Patient limited by other medical complications  [x] Other:  Pt completed tx with no pain and improved trunk stability.   Pt

## 2021-03-22 ENCOUNTER — OFFICE VISIT (OUTPATIENT)
Dept: BARIATRICS/WEIGHT MGMT | Facility: CLINIC | Age: 56
End: 2021-03-22

## 2021-03-22 VITALS
HEART RATE: 75 BPM | WEIGHT: 154 LBS | TEMPERATURE: 98 F | SYSTOLIC BLOOD PRESSURE: 112 MMHG | OXYGEN SATURATION: 98 % | HEIGHT: 60 IN | BODY MASS INDEX: 30.23 KG/M2 | RESPIRATION RATE: 18 BRPM | DIASTOLIC BLOOD PRESSURE: 68 MMHG

## 2021-03-22 DIAGNOSIS — K91.2 POSTGASTRECTOMY MALABSORPTION: ICD-10-CM

## 2021-03-22 DIAGNOSIS — Z98.84 STATUS POST BARIATRIC SURGERY: Primary | ICD-10-CM

## 2021-03-22 DIAGNOSIS — Z13.0 SCREENING, IRON DEFICIENCY ANEMIA: ICD-10-CM

## 2021-03-22 DIAGNOSIS — R53.83 FATIGUE, UNSPECIFIED TYPE: ICD-10-CM

## 2021-03-22 DIAGNOSIS — A04.8 H. PYLORI INFECTION: ICD-10-CM

## 2021-03-22 DIAGNOSIS — E55.9 HYPOVITAMINOSIS D: ICD-10-CM

## 2021-03-22 DIAGNOSIS — Z90.3 POSTGASTRECTOMY MALABSORPTION: ICD-10-CM

## 2021-03-22 DIAGNOSIS — Z13.21 MALNUTRITION SCREEN: ICD-10-CM

## 2021-03-22 PROCEDURE — 99214 OFFICE O/P EST MOD 30 MIN: CPT | Performed by: PHYSICIAN ASSISTANT

## 2021-03-22 NOTE — PROGRESS NOTES
"[facility]  [address]  [address]  [facility] [id number] [phone]        Patient Name:  La Gambino.  :  1965        Reason for Visit:   5 months postop      HPI: La Gambino is a 55 y.o. female s/p 10/21/20 LSG by Dr. Guzmán.    Doing well.  Pleased with progress. No issues/concerns. Denies dysphagia, reflux, nausea, vomiting, abdominal pain, pulmonary issues and fevers.  Getting 100g prot/day, protein drinks. Eating mult meals a day.  Drinking 64+ fluid oz/day.  Last labs revealed bariatric levels wnl.  Taking MVI, B12, Calcium, iron and Vit C. Had been on patches but ran out, pending new shipment is using pills. No longer on PPI.  Exercising- mostly treadmill and PT exercises for core strengthening.    H/o + h pylori July and 2020, treated twice. Negative LSG path. Needs retested.      Presurgery weight: 198 pounds.  Today's weight is 69.9 kg (154 lb) pounds, today's  Body mass index is 30.08 kg/m²., and@ weight loss since surgery is 44 pounds.      Past Medical History:   Diagnosis Date   • Anxiety 2018    better now, no meds   • Dyspepsia    • Dyspnea on exertion    • Elevated transaminase level    • Fatigue    • Folate deficiency    • Frequent headaches     \"extremely tight neck muscles\"   • H. pylori infection     UBT/EGD bx (+) - PrevPak RX 20, repeat UBT (+) - Pylera RX 20   • Hair thinning     on biotin   • Hyperlipidemia    • Hypertension 2012   • Hypothyroidism    • IBS (irritable bowel syndrome)    • Incomplete right bundle branch block    • Joint pain    • Morbid obesity (CMS/HCC)    • OAB (overactive bladder)     w/ urgency and stress incontinence   • PONV (postoperative nausea and vomiting)    • Rosacea    • Vitamin D deficiency    • Wears glasses      Past Surgical History:   Procedure Laterality Date   • CERVICAL CONIZATION, LEEP     •  SECTION     • COLONOSCOPY N/A 4/3/2017    " Procedure: COLONOSCOPY ;  Surgeon: Perma Phillip MD;  Location:  [facility];  Service:    • CYST REMOVAL     • DILATATION AND CURETTAGE  1996   • ENDOMETRIAL ABLATION  2009   • ENDOSCOPY N/A 10/21/2020    Procedure: ESOPHAGOGASTRODUODENOSCOPY;  Surgeon: Rajinder Guzmán MD;  Location:  [facility];  Service: Bariatric;  Laterality: N/A;   • GASTRIC SLEEVE LAPAROSCOPIC N/A 10/21/2020    Procedure: GASTRIC SLEEVE LAPAROSCOPIC;  Surgeon: Rajinder Guzmán MD;  Location:  [facility];  Service: Bariatric;  Laterality: N/A;   • LASIK  2018   • MASTOPEXY Bilateral 2003   • NASAL SEPTUM SURGERY  2005   • SOFT TISSUE CYST EXCISION  2012    from (L) side   • TOTAL LAPAROSCOPIC HYSTERECTOMY  2010    benign dz, w/ bladder tuck   • WISDOM TOOTH EXTRACTION  1983     Outpatient Medications Marked as Taking for the 3/22/21 encounter (Office Visit) with Alina Mcpherson PA-C   Medication Sig Dispense Refill   • APPLE CIDER VINEGAR PO Take 2 tablets by mouth 2 (two) times a day.     • atorvastatin (LIPITOR) 10 MG tablet Take 10 mg by mouth Every Night.     • B Complex Vitamins (VITAMIN B COMPLEX PO) Take 1 tablet by mouth Daily.     • Biotin 1000 MCG tablet Take 1,000 mcg by mouth Daily.     • cetirizine (zyrTEC) 10 MG tablet Take 10 mg by mouth Daily.     • Chlorcyclizine-Pseudoephed (Stahist AD) 25-60 MG tablet Take 1 tablet by mouth 2 (Two) Times a Day.     • levothyroxine (SYNTHROID, LEVOTHROID) 25 MCG tablet Take 25 mcg by mouth Every Morning.     • lisinopril (PRINIVIL,ZESTRIL) 10 MG tablet Take 10 mg by mouth Daily.     • loratadine (CLARITIN) 10 MG tablet Take 10 mg by mouth Daily.     • Multiple Vitamins-Minerals (MULTIVITAMIN ADULT PO) Take  by mouth.     • mupirocin (BACTROBAN) 2 % ointment Apply 1 application topically to the appropriate area as directed Daily.     • oxybutynin XL (DITROPAN XL) 10 MG 24 hr tablet Take 10 mg by mouth Daily.     • Rhofade 1 % cream      • triamcinolone (KENALOG) 0.1 %  "cream      • ursodiol (ACTIGALL) 300 MG capsule Take 300 mg by mouth Daily.     • vitamin C (ASCORBIC ACID) 500 MG tablet Take 500 mg by mouth Daily.     • vitamin E 400 UNIT capsule Take 400 Units by mouth Daily.         No Known Allergies    Social History     Socioeconomic History   • Marital status:      Spouse name: Not on file   • Number of children: Not on file   • Years of education: Not on file   • Highest education level: Not on file   Tobacco Use   • Smoking status: Never Smoker   • Smokeless tobacco: Never Used   Substance and Sexual Activity   • Alcohol use: No   • Drug use: No   • Sexual activity: Yes     Partners: Male     Birth control/protection: Post-menopausal     Comment: Had a hysterectomy....       /68 (BP Location: Left arm, Patient Position: Sitting, Cuff Size: Adult)   Pulse 75   Temp 98 °F (36.7 °C) (Axillary)   Resp 18   Ht 152.4 cm (60\")   Wt 69.9 kg (154 lb)   SpO2 98%   BMI 30.08 kg/m²     Physical Exam  Constitutional:       Appearance: She is well-developed.   HENT:      Head: Normocephalic and atraumatic.      Comments: mask  Cardiovascular:      Rate and Rhythm: Normal rate and regular rhythm.   Pulmonary:      Effort: Pulmonary effort is normal.      Breath sounds: Normal breath sounds.   Abdominal:      General: Bowel sounds are normal.      Palpations: Abdomen is soft.      Comments: Incisions well healed   Skin:     General: Skin is warm and dry.   Neurological:      Mental Status: She is alert.   Psychiatric:         Mood and Affect: Mood normal.         Behavior: Behavior normal.         Thought Content: Thought content normal.         Judgment: Judgment normal.           Assessment:  5 months s/p 10/21/20 LSG by Dr. Guzmán.      ICD-10-CM ICD-9-CM   1. Status post bariatric surgery  Z98.84 V45.86   2. Postgastrectomy malabsorption  K91.2 579.3    Z90.3    3. H. pylori infection  A04.8 041.86   4. Hypovitaminosis D  E55.9 268.9   5. Screening, iron " deficiency anemia  Z13.0 V78.0   6. Malnutrition screen  Z13.21 V77.2   7. Fatigue, unspecified type  R53.83 780.79         Plan:  Will recheck h pylori today, if positive- refer to GI. Continue w/ good food choices and healthy habits.  Continue to focus on high protein, low carb. Cont actigall x 6 months postop.  Keep tracking intake.  Continue routine exercise.  Routine bariatric labs ordered.  Continue vitamins w/ adjustments pending lab results.  Call w/ problems/concerns.     Patient's Body mass index is 30.08 kg/m². BMI is above normal parameters. Recommendations include: exercise counseling and nutrition counseling.        The patient was instructed to follow up in 3 months, sooner if needed.

## 2021-03-28 LAB
25(OH)D3+25(OH)D2 SERPL-MCNC: 51.8 NG/ML (ref 30–100)
ALBUMIN SERPL-MCNC: 4.3 G/DL (ref 3.5–5.2)
ALBUMIN/GLOB SERPL: 1.9 G/DL
ALP SERPL-CCNC: 80 U/L (ref 39–117)
ALT SERPL-CCNC: 16 U/L (ref 1–33)
AST SERPL-CCNC: 22 U/L (ref 1–32)
BASOPHILS # BLD AUTO: 0.04 10*3/MM3 (ref 0–0.2)
BASOPHILS NFR BLD AUTO: 0.5 % (ref 0–1.5)
BILIRUB SERPL-MCNC: 0.3 MG/DL (ref 0–1.2)
BUN SERPL-MCNC: 20 MG/DL (ref 6–20)
BUN/CREAT SERPL: 32.8 (ref 7–25)
CALCIUM SERPL-MCNC: 9.5 MG/DL (ref 8.6–10.5)
CHLORIDE SERPL-SCNC: 105 MMOL/L (ref 98–107)
CO2 SERPL-SCNC: 28.4 MMOL/L (ref 22–29)
CREAT SERPL-MCNC: 0.61 MG/DL (ref 0.57–1)
EOSINOPHIL # BLD AUTO: 0.12 10*3/MM3 (ref 0–0.4)
EOSINOPHIL NFR BLD AUTO: 1.6 % (ref 0.3–6.2)
ERYTHROCYTE [DISTWIDTH] IN BLOOD BY AUTOMATED COUNT: 12 % (ref 12.3–15.4)
FERRITIN SERPL-MCNC: 167 NG/ML (ref 13–150)
FOLATE SERPL-MCNC: >20 NG/ML (ref 4.78–24.2)
GLOBULIN SER CALC-MCNC: 2.3 GM/DL
GLUCOSE SERPL-MCNC: 86 MG/DL (ref 65–99)
HCT VFR BLD AUTO: 43.6 % (ref 34–46.6)
HGB BLD-MCNC: 15.1 G/DL (ref 12–15.9)
IMM GRANULOCYTES # BLD AUTO: 0.02 10*3/MM3 (ref 0–0.05)
IMM GRANULOCYTES NFR BLD AUTO: 0.3 % (ref 0–0.5)
IRON SERPL-MCNC: 83 MCG/DL (ref 37–145)
LYMPHOCYTES # BLD AUTO: 1.71 10*3/MM3 (ref 0.7–3.1)
LYMPHOCYTES NFR BLD AUTO: 22.8 % (ref 19.6–45.3)
Lab: NORMAL
MCH RBC QN AUTO: 32.5 PG (ref 26.6–33)
MCHC RBC AUTO-ENTMCNC: 34.6 G/DL (ref 31.5–35.7)
MCV RBC AUTO: 94 FL (ref 79–97)
METHYLMALONATE SERPL-SCNC: 105 NMOL/L (ref 0–378)
MONOCYTES # BLD AUTO: 0.54 10*3/MM3 (ref 0.1–0.9)
MONOCYTES NFR BLD AUTO: 7.2 % (ref 5–12)
NEUTROPHILS # BLD AUTO: 5.06 10*3/MM3 (ref 1.7–7)
NEUTROPHILS NFR BLD AUTO: 67.6 % (ref 42.7–76)
NRBC BLD AUTO-RTO: 0 /100 WBC (ref 0–0.2)
PLATELET # BLD AUTO: 258 10*3/MM3 (ref 140–450)
POTASSIUM SERPL-SCNC: 4.8 MMOL/L (ref 3.5–5.2)
PREALB SERPL-MCNC: 21 MG/DL (ref 10–36)
PROT SERPL-MCNC: 6.6 G/DL (ref 6–8.5)
RBC # BLD AUTO: 4.64 10*6/MM3 (ref 3.77–5.28)
SODIUM SERPL-SCNC: 140 MMOL/L (ref 136–145)
UREA BREATH TEST QL: NEGATIVE
VIT B1 BLD-SCNC: 184.4 NMOL/L (ref 66.5–200)
WBC # BLD AUTO: 7.49 10*3/MM3 (ref 3.4–10.8)

## 2021-05-28 ENCOUNTER — HOSPITAL ENCOUNTER (OUTPATIENT)
Facility: HOSPITAL | Age: 56
Discharge: HOME | End: 2021-05-28
Payer: OTHER GOVERNMENT

## 2021-05-28 PROCEDURE — 36415 COLL VENOUS BLD VENIPUNCTURE: CPT

## 2021-07-16 ENCOUNTER — HOSPITAL ENCOUNTER (OUTPATIENT)
Dept: MAMMOGRAPHY | Facility: HOSPITAL | Age: 56
Discharge: HOME | End: 2021-07-16
Payer: OTHER GOVERNMENT

## 2021-07-16 DIAGNOSIS — Z12.31 BREAST CANCER SCREENING BY MAMMOGRAM: ICD-10-CM

## 2021-07-16 PROCEDURE — 77063 BREAST TOMOSYNTHESIS BI: CPT

## 2021-08-11 ENCOUNTER — HOSPITAL ENCOUNTER (OUTPATIENT)
Facility: HOSPITAL | Age: 56
Discharge: HOME | End: 2021-08-11
Payer: OTHER GOVERNMENT

## 2021-08-11 LAB
A/G RATIO: 1.9 (ref 0.8–2)
ALBUMIN SERPL-MCNC: 4.2 G/DL (ref 3.4–4.8)
ALP BLD-CCNC: 96 U/L (ref 25–100)
ALT SERPL-CCNC: 25 U/L (ref 4–36)
ANION GAP SERPL CALCULATED.3IONS-SCNC: 7 MMOL/L (ref 3–16)
AST SERPL-CCNC: 21 U/L (ref 8–33)
BASOPHILS ABSOLUTE: 0 K/UL (ref 0–0.1)
BASOPHILS RELATIVE PERCENT: 0.6 %
BILIRUB SERPL-MCNC: 0.4 MG/DL (ref 0.3–1.2)
BUN BLDV-MCNC: 17 MG/DL (ref 6–20)
CALCIUM SERPL-MCNC: 9 MG/DL (ref 8.5–10.5)
CHLORIDE BLD-SCNC: 105 MMOL/L (ref 98–107)
CHOLESTEROL, TOTAL: 141 MG/DL (ref 0–200)
CO2: 27 MMOL/L (ref 20–30)
CREAT SERPL-MCNC: 0.7 MG/DL (ref 0.4–1.2)
EOSINOPHILS ABSOLUTE: 0.2 K/UL (ref 0–0.4)
EOSINOPHILS RELATIVE PERCENT: 3.3 %
FOLATE: 18.1 NG/ML
GFR AFRICAN AMERICAN: >59
GFR NON-AFRICAN AMERICAN: >60
GLOBULIN: 2.2 G/DL
GLUCOSE BLD-MCNC: 84 MG/DL (ref 74–106)
HCT VFR BLD CALC: 40.9 % (ref 37–47)
HDLC SERPL-MCNC: 47 MG/DL (ref 40–60)
HEMOGLOBIN: 13.6 G/DL (ref 11.5–16.5)
IMMATURE GRANULOCYTES #: 0 K/UL
IMMATURE GRANULOCYTES %: 0.2 % (ref 0–5)
LDL CHOLESTEROL CALCULATED: 73 MG/DL
LYMPHOCYTES ABSOLUTE: 1.6 K/UL (ref 1.5–4)
LYMPHOCYTES RELATIVE PERCENT: 23.6 %
MCH RBC QN AUTO: 31.9 PG (ref 27–32)
MCHC RBC AUTO-ENTMCNC: 33.3 G/DL (ref 31–35)
MCV RBC AUTO: 95.8 FL (ref 80–100)
MONOCYTES ABSOLUTE: 0.4 K/UL (ref 0.2–0.8)
MONOCYTES RELATIVE PERCENT: 6.5 %
NEUTROPHILS ABSOLUTE: 4.4 K/UL (ref 2–7.5)
NEUTROPHILS RELATIVE PERCENT: 65.8 %
PDW BLD-RTO: 12.2 % (ref 11–16)
PLATELET # BLD: 242 K/UL (ref 150–400)
PMV BLD AUTO: 11.3 FL (ref 6–10)
POTASSIUM SERPL-SCNC: 4.4 MMOL/L (ref 3.4–5.1)
RBC # BLD: 4.27 M/UL (ref 3.8–5.8)
SODIUM BLD-SCNC: 139 MMOL/L (ref 136–145)
TOTAL PROTEIN: 6.4 G/DL (ref 6.4–8.3)
TRIGL SERPL-MCNC: 105 MG/DL (ref 0–249)
TSH SERPL DL<=0.05 MIU/L-ACNC: 1.44 UIU/ML (ref 0.27–4.2)
VITAMIN B-12: 545 PG/ML (ref 211–911)
VLDLC SERPL CALC-MCNC: 21 MG/DL
WBC # BLD: 6.6 K/UL (ref 4–11)

## 2021-08-11 PROCEDURE — 84443 ASSAY THYROID STIM HORMONE: CPT

## 2021-08-11 PROCEDURE — 36415 COLL VENOUS BLD VENIPUNCTURE: CPT

## 2021-08-11 PROCEDURE — 80053 COMPREHEN METABOLIC PANEL: CPT

## 2021-08-11 PROCEDURE — 85025 COMPLETE CBC W/AUTO DIFF WBC: CPT

## 2021-08-11 PROCEDURE — 82607 VITAMIN B-12: CPT

## 2021-08-11 PROCEDURE — 82746 ASSAY OF FOLIC ACID SERUM: CPT

## 2021-08-11 PROCEDURE — 80061 LIPID PANEL: CPT

## 2021-08-23 ENCOUNTER — HOSPITAL ENCOUNTER (OUTPATIENT)
Facility: HOSPITAL | Age: 56
Discharge: HOME | End: 2021-08-23
Payer: OTHER GOVERNMENT

## 2021-08-23 LAB
ESTRADIOL LEVEL: 171 PG/ML
FOLLICLE STIMULATING HORMONE: 3.9 MIU/ML

## 2021-08-23 PROCEDURE — 83001 ASSAY OF GONADOTROPIN (FSH): CPT

## 2021-08-23 PROCEDURE — 36415 COLL VENOUS BLD VENIPUNCTURE: CPT

## 2021-08-23 PROCEDURE — 84270 ASSAY OF SEX HORMONE GLOBUL: CPT

## 2021-08-23 PROCEDURE — 84403 ASSAY OF TOTAL TESTOSTERONE: CPT

## 2021-08-23 PROCEDURE — 82670 ASSAY OF TOTAL ESTRADIOL: CPT

## 2021-08-25 LAB
SEX HORMONE BINDING GLOBULIN: 61 NMOL/L (ref 30–135)
TESTOSTERONE FREE-NONMALE: 4.6 PG/ML (ref 0.6–3.8)
TESTOSTERONE TOTAL: 38 NG/DL (ref 20–70)

## 2021-09-29 ENCOUNTER — OFFICE VISIT (OUTPATIENT)
Dept: BARIATRICS/WEIGHT MGMT | Facility: CLINIC | Age: 56
End: 2021-09-29

## 2021-09-29 VITALS
DIASTOLIC BLOOD PRESSURE: 64 MMHG | TEMPERATURE: 97.5 F | HEART RATE: 69 BPM | SYSTOLIC BLOOD PRESSURE: 112 MMHG | WEIGHT: 151 LBS | BODY MASS INDEX: 29.64 KG/M2 | RESPIRATION RATE: 18 BRPM | HEIGHT: 60 IN | OXYGEN SATURATION: 99 %

## 2021-09-29 DIAGNOSIS — R53.83 FATIGUE, UNSPECIFIED TYPE: Primary | ICD-10-CM

## 2021-09-29 DIAGNOSIS — Z13.21 MALNUTRITION SCREEN: ICD-10-CM

## 2021-09-29 DIAGNOSIS — K91.2 POSTGASTRECTOMY MALABSORPTION: ICD-10-CM

## 2021-09-29 DIAGNOSIS — Z90.3 POSTGASTRECTOMY MALABSORPTION: ICD-10-CM

## 2021-09-29 DIAGNOSIS — Z13.0 SCREENING, IRON DEFICIENCY ANEMIA: ICD-10-CM

## 2021-09-29 DIAGNOSIS — E55.9 HYPOVITAMINOSIS D: ICD-10-CM

## 2021-09-29 PROCEDURE — 99213 OFFICE O/P EST LOW 20 MIN: CPT | Performed by: SURGERY

## 2021-09-29 NOTE — PROGRESS NOTES
"[facility]ariat[facility]  [address]  [address]  [facility] [id number] [phone]        Patient Name:  La Gambino.  :  1965      Date of Visit: 2021      Reason for Visit:   1 year postop      HPI: La Gambino is a 56 y.o. female s/p 10/21/20 LSG by Dr. Guzmán.    LOV 6 months post op.  Has had diarrhea since the summer.  Usually first thing in morning, then normal thereafter.  Doesn't matter what she eats.    Doing well.  No issues/concerns. Denies dysphagia, reflux, nausea, vomiting and abdominal pain.  Getting ?? g prot/day.  Does not track.  Thinks she is doing pretty well.    Drinking <64 fluid oz/day. Last labs revealed  no vitamin deficiencies. Taking Patches: patchaid MVI + 8 more.  Recently stopped. Exercise: nothing currently.     Presurgery weight: 198 pounds.  Today's weight is 68.5 kg (151 lb) pounds, today's  Body mass index is 29.49 kg/m²., and@ weight loss since surgery is 47 pounds.      Past Medical History:   Diagnosis Date   • Anxiety 2018    better now, no meds   • Dyspepsia    • Dyspnea on exertion    • Elevated transaminase level    • Fatigue    • Folate deficiency    • Frequent headaches     \"extremely tight neck muscles\"   • H. pylori infection     UBT/EGD bx (+) - PrevPak RX 20, repeat UBT (+) - Pylera RX 20   • Hair thinning     on biotin   • Hyperlipidemia    • Hypertension 2012   • Hypothyroidism    • IBS (irritable bowel syndrome)    • Incomplete right bundle branch block    • Joint pain    • Morbid obesity (CMS/HCC)    • OAB (overactive bladder)     w/ urgency and stress incontinence   • PONV (postoperative nausea and vomiting)    • Rosacea    • Vitamin D deficiency    • Wears glasses      Past Surgical History:   Procedure Laterality Date   • CERVICAL CONIZATION, LEEP     •  SECTION     • COLONOSCOPY N/A 4/3/2017    Procedure: COLONOSCOPY ;  Surgeon: Prema Phillip MD;  " Location:  [facility];  Service:    • CYST REMOVAL     • DILATATION AND CURETTAGE  1996   • ENDOMETRIAL ABLATION  2009   • ENDOSCOPY N/A 10/21/2020    Procedure: ESOPHAGOGASTRODUODENOSCOPY;  Surgeon: Rajinder Guzmán MD;  Location:  [facility];  Service: Bariatric;  Laterality: N/A;   • GASTRIC SLEEVE LAPAROSCOPIC N/A 10/21/2020    Procedure: GASTRIC SLEEVE LAPAROSCOPIC;  Surgeon: Rajinder Guzmán MD;  Location:  [facility];  Service: Bariatric;  Laterality: N/A;   • LASIK  2018   • MASTOPEXY Bilateral 2003   • NASAL SEPTUM SURGERY  2005   • SOFT TISSUE CYST EXCISION  2012    from (L) side   • TOTAL LAPAROSCOPIC HYSTERECTOMY  2010    benign dz, w/ bladder tuck   • WISDOM TOOTH EXTRACTION  1983     Outpatient Medications Marked as Taking for the 9/29/21 encounter (Office Visit) with Marti Canseco MD   Medication Sig Dispense Refill   • atorvastatin (LIPITOR) 10 MG tablet Take 10 mg by mouth Every Night.     • B Complex Vitamins (VITAMIN B COMPLEX PO) Take 1 tablet by mouth Daily.     • Biotin 1000 MCG tablet Take 1,000 mcg by mouth Daily.     • cetirizine (zyrTEC) 10 MG tablet Take 10 mg by mouth Daily.     • Chlorcyclizine-Pseudoephed (Stahist AD) 25-60 MG tablet Take 1 tablet by mouth 2 (Two) Times a Day.     • folic acid (FOLVITE) 1 MG tablet      • levothyroxine (SYNTHROID, LEVOTHROID) 25 MCG tablet Take 25 mcg by mouth Every Morning.     • loratadine (CLARITIN) 10 MG tablet Take 10 mg by mouth Daily.     • Multiple Vitamins-Minerals (MULTIVITAMIN ADULT PO) Take  by mouth.     • oxybutynin XL (DITROPAN XL) 10 MG 24 hr tablet Take 10 mg by mouth Daily.     • vitamin C (ASCORBIC ACID) 500 MG tablet Take 500 mg by mouth Daily.     • vitamin E 400 UNIT capsule Take 400 Units by mouth Daily.     • [DISCONTINUED] APPLE CIDER VINEGAR PO Take 2 tablets by mouth 2 (two) times a day.         No Known Allergies    Social History     Socioeconomic History   • Marital status:      Spouse name: Not on  "file   • Number of children: Not on file   • Years of education: Not on file   • Highest education level: Not on file   Tobacco Use   • Smoking status: Never Smoker   • Smokeless tobacco: Never Used   Substance and Sexual Activity   • Alcohol use: No   • Drug use: No   • Sexual activity: Yes     Partners: Male     Birth control/protection: Post-menopausal     Comment: Had a hysterectomy....       /64 (BP Location: Left arm, Patient Position: Sitting, Cuff Size: Large Adult)   Pulse 69   Temp 97.5 °F (36.4 °C) (Temporal)   Resp 18   Ht 152.4 cm (60\")   Wt 68.5 kg (151 lb)   SpO2 99%   BMI 29.49 kg/m²     Physical Exam  Constitutional:       General: She is not in acute distress.     Appearance: She is well-developed. She is not diaphoretic.   HENT:      Head: Normocephalic and atraumatic.      Mouth/Throat:      Pharynx: No oropharyngeal exudate.   Eyes:      Conjunctiva/sclera: Conjunctivae normal.      Pupils: Pupils are equal, round, and reactive to light.   Pulmonary:      Effort: Pulmonary effort is normal. No respiratory distress.   Abdominal:      General: There is no distension.      Palpations: Abdomen is soft.   Skin:     General: Skin is warm and dry.      Coloration: Skin is not pale.   Neurological:      Mental Status: She is alert and oriented to person, place, and time.      Cranial Nerves: No cranial nerve deficit.   Psychiatric:         Behavior: Behavior normal.         Thought Content: Thought content normal.           Assessment:  1 year s/p 10/21/20 LSG by Dr. Guzmán.      ICD-10-CM ICD-9-CM   1. Fatigue, unspecified type  R53.83 780.79   2. Hypovitaminosis D  E55.9 268.9   3. Malnutrition screen  Z13.21 V77.2   4. Screening, iron deficiency anemia  Z13.0 V78.0   5. Postgastrectomy malabsorption  K91.2 579.3    Z90.3          Plan:  Doing well. Continue w/ good food choices and healthy habits.  Continue protein >70g/day.  Continue routine exercise.  Routine bariatric labs ordered.  " Continue vitamins w/ adjustments pending lab results.  Call w/ problems/concerns.     Recommend track intake and start exercising.  Can likely just drop down to MVI pill or patch.  Re: diarrhea, d/w PCP.    The patient was instructed to follow up in 6 months, sooner if needed.    Refer to exercise physiologist.    note: approx 15 of the 25 minute visit was spent counseling on nutrition and necessary dietary/lifestyle modifications face to face.    Marti Canseco MD

## 2021-10-06 LAB
25(OH)D3+25(OH)D2 SERPL-MCNC: 51.7 NG/ML (ref 30–100)
ALBUMIN SERPL-MCNC: 4.5 G/DL (ref 3.8–4.9)
ALBUMIN/GLOB SERPL: 1.8 {RATIO} (ref 1.2–2.2)
ALP SERPL-CCNC: 99 IU/L (ref 44–121)
ALT SERPL-CCNC: 32 IU/L (ref 0–32)
AST SERPL-CCNC: 32 IU/L (ref 0–40)
BASOPHILS # BLD AUTO: 0.1 X10E3/UL (ref 0–0.2)
BASOPHILS NFR BLD AUTO: 1 %
BILIRUB SERPL-MCNC: 0.4 MG/DL (ref 0–1.2)
BUN SERPL-MCNC: 17 MG/DL (ref 6–24)
BUN/CREAT SERPL: 26 (ref 9–23)
CALCIUM SERPL-MCNC: 9.2 MG/DL (ref 8.7–10.2)
CHLORIDE SERPL-SCNC: 105 MMOL/L (ref 96–106)
CO2 SERPL-SCNC: 23 MMOL/L (ref 20–29)
CREAT SERPL-MCNC: 0.65 MG/DL (ref 0.57–1)
EOSINOPHIL # BLD AUTO: 0.2 X10E3/UL (ref 0–0.4)
EOSINOPHIL NFR BLD AUTO: 3 %
ERYTHROCYTE [DISTWIDTH] IN BLOOD BY AUTOMATED COUNT: 12 % (ref 11.7–15.4)
FERRITIN SERPL-MCNC: 174 NG/ML (ref 15–150)
FOLATE SERPL-MCNC: >20 NG/ML
GLOBULIN SER CALC-MCNC: 2.5 G/DL (ref 1.5–4.5)
GLUCOSE SERPL-MCNC: 76 MG/DL (ref 65–99)
HCT VFR BLD AUTO: 46.7 % (ref 34–46.6)
HGB BLD-MCNC: 15.4 G/DL (ref 11.1–15.9)
IMM GRANULOCYTES # BLD AUTO: 0 X10E3/UL (ref 0–0.1)
IMM GRANULOCYTES NFR BLD AUTO: 0 %
IRON SERPL-MCNC: 113 UG/DL (ref 27–159)
LYMPHOCYTES # BLD AUTO: 1.5 X10E3/UL (ref 0.7–3.1)
LYMPHOCYTES NFR BLD AUTO: 25 %
Lab: NORMAL
MCH RBC QN AUTO: 31.7 PG (ref 26.6–33)
MCHC RBC AUTO-ENTMCNC: 33 G/DL (ref 31.5–35.7)
MCV RBC AUTO: 96 FL (ref 79–97)
METHYLMALONATE SERPL-SCNC: 79 NMOL/L (ref 0–378)
MONOCYTES # BLD AUTO: 0.5 X10E3/UL (ref 0.1–0.9)
MONOCYTES NFR BLD AUTO: 8 %
NEUTROPHILS # BLD AUTO: 3.8 X10E3/UL (ref 1.4–7)
NEUTROPHILS NFR BLD AUTO: 63 %
PLATELET # BLD AUTO: 244 X10E3/UL (ref 150–450)
POTASSIUM SERPL-SCNC: 4.5 MMOL/L (ref 3.5–5.2)
PREALB SERPL-MCNC: 24 MG/DL (ref 10–36)
PROT SERPL-MCNC: 7 G/DL (ref 6–8.5)
RBC # BLD AUTO: 4.86 X10E6/UL (ref 3.77–5.28)
SODIUM SERPL-SCNC: 142 MMOL/L (ref 134–144)
VIT B1 BLD-SCNC: 177.1 NMOL/L (ref 66.5–200)
WBC # BLD AUTO: 6 X10E3/UL (ref 3.4–10.8)

## 2021-10-07 VITALS — HEIGHT: 61 IN | BODY MASS INDEX: 28.32 KG/M2 | WEIGHT: 150 LBS

## 2021-10-07 DIAGNOSIS — U07.1 COVID-19: Primary | ICD-10-CM

## 2021-10-07 RX ORDER — ACETAMINOPHEN 325 MG/1
650 TABLET ORAL ONCE
Status: CANCELLED
Start: 2021-10-08 | End: 2021-10-08

## 2021-10-07 RX ORDER — DIPHENHYDRAMINE HYDROCHLORIDE 50 MG/ML
50 INJECTION INTRAMUSCULAR; INTRAVENOUS ONCE
OUTPATIENT
Start: 2021-10-08 | End: 2021-10-08

## 2021-10-07 RX ORDER — SODIUM CHLORIDE 9 MG/ML
25 INJECTION, SOLUTION INTRAVENOUS PRN
Status: CANCELLED | OUTPATIENT
Start: 2021-10-08

## 2021-10-07 RX ORDER — DIPHENHYDRAMINE HYDROCHLORIDE 50 MG/ML
25 INJECTION INTRAMUSCULAR; INTRAVENOUS ONCE
Status: CANCELLED
Start: 2021-10-08 | End: 2021-10-08

## 2021-10-07 RX ORDER — SODIUM CHLORIDE 9 MG/ML
INJECTION, SOLUTION INTRAVENOUS CONTINUOUS
OUTPATIENT
Start: 2021-10-08

## 2021-10-07 RX ORDER — EPINEPHRINE 1 MG/ML
0.3 INJECTION, SOLUTION, CONCENTRATE INTRAVENOUS PRN
OUTPATIENT
Start: 2021-10-08

## 2021-10-07 RX ORDER — METHYLPREDNISOLONE SODIUM SUCCINATE 125 MG/2ML
125 INJECTION, POWDER, LYOPHILIZED, FOR SOLUTION INTRAMUSCULAR; INTRAVENOUS ONCE
OUTPATIENT
Start: 2021-10-08 | End: 2021-10-08

## 2021-10-08 ENCOUNTER — HOSPITAL ENCOUNTER (OUTPATIENT)
Dept: NURSING | Facility: HOSPITAL | Age: 56
Setting detail: INFUSION SERIES
Discharge: HOME | End: 2021-10-10
Payer: OTHER GOVERNMENT

## 2021-10-08 VITALS
SYSTOLIC BLOOD PRESSURE: 121 MMHG | HEART RATE: 71 BPM | TEMPERATURE: 98.2 F | RESPIRATION RATE: 20 BRPM | DIASTOLIC BLOOD PRESSURE: 65 MMHG | OXYGEN SATURATION: 97 %

## 2021-10-08 DIAGNOSIS — U07.1 COVID-19: Primary | ICD-10-CM

## 2021-10-08 PROCEDURE — 6370000000 HC RX 637 (ALT 250 FOR IP): Performed by: NURSE PRACTITIONER

## 2021-10-08 PROCEDURE — M0243 CASIRIVI AND IMDEVI INFUSION: HCPCS

## 2021-10-08 PROCEDURE — 6360000002 HC RX W HCPCS: Performed by: NURSE PRACTITIONER

## 2021-10-08 PROCEDURE — 96374 THER/PROPH/DIAG INJ IV PUSH: CPT

## 2021-10-08 PROCEDURE — 2580000003 HC RX 258: Performed by: NURSE PRACTITIONER

## 2021-10-08 RX ORDER — METHYLPREDNISOLONE SODIUM SUCCINATE 125 MG/2ML
125 INJECTION, POWDER, LYOPHILIZED, FOR SOLUTION INTRAMUSCULAR; INTRAVENOUS ONCE
OUTPATIENT
Start: 2021-10-08 | End: 2021-10-08

## 2021-10-08 RX ORDER — SODIUM CHLORIDE 9 MG/ML
INJECTION, SOLUTION INTRAVENOUS CONTINUOUS
OUTPATIENT
Start: 2021-10-08

## 2021-10-08 RX ORDER — SODIUM CHLORIDE 9 MG/ML
25 INJECTION, SOLUTION INTRAVENOUS PRN
Status: CANCELLED | OUTPATIENT
Start: 2021-10-08

## 2021-10-08 RX ORDER — DIPHENHYDRAMINE HYDROCHLORIDE 50 MG/ML
25 INJECTION INTRAMUSCULAR; INTRAVENOUS ONCE
Status: CANCELLED
Start: 2021-10-08 | End: 2021-10-08

## 2021-10-08 RX ORDER — ACETAMINOPHEN 325 MG/1
650 TABLET ORAL ONCE
Status: COMPLETED | OUTPATIENT
Start: 2021-10-08 | End: 2021-10-08

## 2021-10-08 RX ORDER — SODIUM CHLORIDE 9 MG/ML
25 INJECTION, SOLUTION INTRAVENOUS PRN
Status: ACTIVE | OUTPATIENT
Start: 2021-10-08 | End: 2021-10-09

## 2021-10-08 RX ORDER — DIPHENHYDRAMINE HYDROCHLORIDE 50 MG/ML
50 INJECTION INTRAMUSCULAR; INTRAVENOUS ONCE
OUTPATIENT
Start: 2021-10-08 | End: 2021-10-08

## 2021-10-08 RX ORDER — ACETAMINOPHEN 325 MG/1
650 TABLET ORAL ONCE
Status: CANCELLED
Start: 2021-10-08 | End: 2021-10-08

## 2021-10-08 RX ORDER — EPINEPHRINE 1 MG/ML
0.3 INJECTION, SOLUTION, CONCENTRATE INTRAVENOUS PRN
OUTPATIENT
Start: 2021-10-08

## 2021-10-08 RX ORDER — DIPHENHYDRAMINE HYDROCHLORIDE 50 MG/ML
25 INJECTION INTRAMUSCULAR; INTRAVENOUS ONCE
Status: COMPLETED | OUTPATIENT
Start: 2021-10-08 | End: 2021-10-08

## 2021-10-08 RX ADMIN — SODIUM CHLORIDE 25 ML: 9 INJECTION, SOLUTION INTRAVENOUS at 11:28

## 2021-10-08 RX ADMIN — ACETAMINOPHEN 650 MG: 325 TABLET, FILM COATED ORAL at 11:30

## 2021-10-08 RX ADMIN — CASIRIVIMAB AND IMDEVIMAB 1200 MG: 600; 600 INJECTION, SOLUTION, CONCENTRATE INTRAVENOUS at 11:31

## 2021-10-08 RX ADMIN — DIPHENHYDRAMINE HYDROCHLORIDE 25 MG: 50 INJECTION, SOLUTION INTRAMUSCULAR; INTRAVENOUS at 11:30

## 2021-10-08 ASSESSMENT — PAIN SCALES - GENERAL: PAINLEVEL_OUTOF10: 0

## 2021-10-08 NOTE — FLOWSHEET NOTE
Pt arrived to [facility] for out pt Regen-cov infusion. RN utilizing proper aerosol droplet Covid precautions. Educated pt on infusion therapy. VSS. IV inserted, #22 in lt ac. No complications at site and appears to be in good working condition. Pharmacy notified. Medications delivered. Infusions started. See MAR. Will continue to monitor pt throughout infusion.

## 2021-10-18 ENCOUNTER — HOSPITAL ENCOUNTER (OUTPATIENT)
Dept: GENERAL RADIOLOGY | Facility: HOSPITAL | Age: 56
Discharge: HOME | End: 2021-10-18
Payer: OTHER GOVERNMENT

## 2021-10-18 ENCOUNTER — HOSPITAL ENCOUNTER (OUTPATIENT)
Facility: HOSPITAL | Age: 56
Discharge: HOME | End: 2021-10-18
Payer: OTHER GOVERNMENT

## 2021-10-18 DIAGNOSIS — J06.9 ACUTE RESPIRATORY DISEASE: ICD-10-CM

## 2021-10-18 DIAGNOSIS — R05.1 ACUTE COUGH: ICD-10-CM

## 2021-10-18 PROCEDURE — 71046 X-RAY EXAM CHEST 2 VIEWS: CPT

## 2021-11-02 ENCOUNTER — OFFICE VISIT (OUTPATIENT)
Dept: OTHER | Facility: HOSPITAL | Age: 56
End: 2021-11-02

## 2021-11-02 NOTE — PROGRESS NOTES
Exercise Education Note - Initial Visit    Patient: La Gambino       Date: 11/2/2021    Telehealth Exercise consult, 60 minutes counseling and program design/preparations. This medical referred consult was provided as a ZOOM call, as patient is unable to attend an in-office appointment due to the COVID-19 crisis. Consent for treatment was given verbally.    For/Concerns: Weight Management/Cont Loss post bariatric surgery    Current Health Status: Patient Stated Good-Excellent    Current Exercise Abilities/Experience: Beginner    Equipment / Facilities Available: Treadmill, Dumbbell, PT Bands, at home    Barriers to Exercise: Motivation, Inexperience, Time    Desired Outcomes: Continue with healthy weight loss and maintance    Planning for Consistency / Achievement Strategies: patient education today focused on health weight loss and management discussing briefly areas of sleep, stress mgt, hydration, nutrition and physical activity.  Patient share existing efforts and reports she feels she manages stress well, sleeps 6 hours, has not tracked protein or hydration recently and was walking but has been ill and just getting back into the habit.  We evaluated appropriate next steps and patient decided goals for the next few weeks would focus on :  1. Track and Improve hydration   2. Walk on treadmill 4 miles 2 days per week, the other 2 days strength train and walk 2 miles.     Patient feels these goals are realistic and sustainable.  We discussed appropriate intensity of activity and will evaluate progress and changes as follow up scheduled for 6 weeks.     Raji Miguel  11/2/2021  11:21 EDT

## 2021-11-17 ENCOUNTER — HOSPITAL ENCOUNTER (OUTPATIENT)
Facility: HOSPITAL | Age: 56
Discharge: HOME | End: 2021-11-17
Payer: OTHER GOVERNMENT

## 2021-11-17 PROCEDURE — 84403 ASSAY OF TOTAL TESTOSTERONE: CPT

## 2021-11-17 PROCEDURE — 84270 ASSAY OF SEX HORMONE GLOBUL: CPT

## 2021-11-17 PROCEDURE — 83001 ASSAY OF GONADOTROPIN (FSH): CPT

## 2021-11-17 PROCEDURE — 36415 COLL VENOUS BLD VENIPUNCTURE: CPT

## 2021-11-17 PROCEDURE — 82670 ASSAY OF TOTAL ESTRADIOL: CPT

## 2021-11-18 LAB
ESTRADIOL LEVEL: 129 PG/ML
FOLLICLE STIMULATING HORMONE: 3.8 MIU/ML

## 2021-11-23 LAB
SEX HORMONE BINDING GLOBULIN: 54 NMOL/L (ref 30–135)
TESTOSTERONE FREE-NONMALE: 2.3 PG/ML (ref 0.6–3.8)
TESTOSTERONE TOTAL: 18 NG/DL (ref 20–70)

## 2021-12-21 ENCOUNTER — OFFICE VISIT (OUTPATIENT)
Dept: OTHER | Facility: HOSPITAL | Age: 56
End: 2021-12-21

## 2021-12-21 NOTE — PROGRESS NOTES
Exercise Education Note - Follow Up    Patient: La Gambino       Date: 12/21/2021    Telehealth Exercise consult, 30 minutes counseling and program design/preparations. This medical referred consult was provided as a phone call, as patient is unable to attend an in-office appointment due to the COVID-19 crisis. Consent for treatment was given verbally.    Goal(s) Achievement Status: patient reports she is getting 64 oz of water and will aim for a few more.  She is walking 3 days per week for 45 min at a brisk pace. States she didn't see previous email sent so has not done the strength exercises sent.  We confirmed the email address and resent documents.  Patient shared she intentionally slowed her weightloss due to lose skin concerns but not sure if that was a good idea.  She also had questions about carbonated beverages, it was advised she check with her surgeon about the safety of add that in in small amounts.    Next Steps: Begin incorporating the strength training exercise and track water and protein.    Follow up set for 5 weeks    Raji Miguel  12/21/2021  11:54 EST

## 2022-01-26 ENCOUNTER — APPOINTMENT (OUTPATIENT)
Dept: OTHER | Facility: HOSPITAL | Age: 57
End: 2022-01-26

## 2022-02-10 ENCOUNTER — HOSPITAL ENCOUNTER (OUTPATIENT)
Facility: HOSPITAL | Age: 57
Discharge: HOME | End: 2022-02-10
Payer: OTHER GOVERNMENT

## 2022-02-10 PROCEDURE — 82670 ASSAY OF TOTAL ESTRADIOL: CPT

## 2022-02-10 PROCEDURE — 84270 ASSAY OF SEX HORMONE GLOBUL: CPT

## 2022-02-10 PROCEDURE — 36415 COLL VENOUS BLD VENIPUNCTURE: CPT

## 2022-02-10 PROCEDURE — 83001 ASSAY OF GONADOTROPIN (FSH): CPT

## 2022-02-10 PROCEDURE — 84403 ASSAY OF TOTAL TESTOSTERONE: CPT

## 2022-02-11 LAB
ESTRADIOL LEVEL: 158 PG/ML
FOLLICLE STIMULATING HORMONE: 3.6 MIU/ML

## 2022-02-15 LAB
SEX HORMONE BINDING GLOBULIN: 80 NMOL/L (ref 30–135)
TESTOSTERONE FREE-NONMALE: 1.7 PG/ML (ref 0.6–3.8)
TESTOSTERONE TOTAL: 17 NG/DL (ref 20–70)

## 2022-04-22 ENCOUNTER — HOSPITAL ENCOUNTER (OUTPATIENT)
Facility: HOSPITAL | Age: 57
Discharge: HOME | End: 2022-04-22
Payer: OTHER GOVERNMENT

## 2022-04-22 LAB
A/G RATIO: 1.7 (ref 0.8–2)
ALBUMIN SERPL-MCNC: 4.3 G/DL (ref 3.4–4.8)
ALP BLD-CCNC: 100 U/L (ref 25–100)
ALT SERPL-CCNC: 30 U/L (ref 4–36)
ANION GAP SERPL CALCULATED.3IONS-SCNC: 12 MMOL/L (ref 3–16)
AST SERPL-CCNC: 30 U/L (ref 8–33)
BASOPHILS ABSOLUTE: 0.1 K/UL (ref 0–0.1)
BASOPHILS RELATIVE PERCENT: 1 %
BILIRUB SERPL-MCNC: 0.4 MG/DL (ref 0.3–1.2)
BUN BLDV-MCNC: 21 MG/DL (ref 6–20)
CALCIUM SERPL-MCNC: 8.8 MG/DL (ref 8.5–10.5)
CHLORIDE BLD-SCNC: 107 MMOL/L (ref 98–107)
CHOLESTEROL, TOTAL: 150 MG/DL (ref 0–200)
CO2: 22 MMOL/L (ref 20–30)
CREAT SERPL-MCNC: 0.6 MG/DL (ref 0.4–1.2)
EOSINOPHILS ABSOLUTE: 0.2 K/UL (ref 0–0.4)
EOSINOPHILS RELATIVE PERCENT: 3.4 %
FOLATE: >20 NG/ML
GFR AFRICAN AMERICAN: >59
GFR NON-AFRICAN AMERICAN: >60
GLOBULIN: 2.6 G/DL
GLUCOSE BLD-MCNC: 91 MG/DL (ref 74–106)
HCT VFR BLD CALC: 43.3 % (ref 37–47)
HDLC SERPL-MCNC: 51 MG/DL (ref 40–60)
HEMOGLOBIN: 14.5 G/DL (ref 11.5–16.5)
IMMATURE GRANULOCYTES #: 0 K/UL
IMMATURE GRANULOCYTES %: 0.2 % (ref 0–5)
LDL CHOLESTEROL CALCULATED: 80 MG/DL
LYMPHOCYTES ABSOLUTE: 1.6 K/UL (ref 1.5–4)
LYMPHOCYTES RELATIVE PERCENT: 26.7 %
MCH RBC QN AUTO: 31.2 PG (ref 27–32)
MCHC RBC AUTO-ENTMCNC: 33.5 G/DL (ref 31–35)
MCV RBC AUTO: 93.1 FL (ref 80–100)
MONOCYTES ABSOLUTE: 0.5 K/UL (ref 0.2–0.8)
MONOCYTES RELATIVE PERCENT: 8.2 %
NEUTROPHILS ABSOLUTE: 3.7 K/UL (ref 2–7.5)
NEUTROPHILS RELATIVE PERCENT: 60.5 %
PDW BLD-RTO: 12.5 % (ref 11–16)
PLATELET # BLD: 250 K/UL (ref 150–400)
PMV BLD AUTO: 11.2 FL (ref 6–10)
POTASSIUM SERPL-SCNC: 4.4 MMOL/L (ref 3.4–5.1)
RBC # BLD: 4.65 M/UL (ref 3.8–5.8)
SODIUM BLD-SCNC: 141 MMOL/L (ref 136–145)
TOTAL PROTEIN: 6.9 G/DL (ref 6.4–8.3)
TRIGL SERPL-MCNC: 94 MG/DL (ref 0–249)
TSH SERPL DL<=0.05 MIU/L-ACNC: 1.4 UIU/ML (ref 0.27–4.2)
VITAMIN B-12: 923 PG/ML (ref 211–911)
VLDLC SERPL CALC-MCNC: 19 MG/DL
WBC # BLD: 6.1 K/UL (ref 4–11)

## 2022-04-22 PROCEDURE — 36415 COLL VENOUS BLD VENIPUNCTURE: CPT

## 2022-04-22 PROCEDURE — 82607 VITAMIN B-12: CPT

## 2022-04-22 PROCEDURE — 80053 COMPREHEN METABOLIC PANEL: CPT

## 2022-04-22 PROCEDURE — 85025 COMPLETE CBC W/AUTO DIFF WBC: CPT

## 2022-04-22 PROCEDURE — 84443 ASSAY THYROID STIM HORMONE: CPT

## 2022-04-22 PROCEDURE — 80061 LIPID PANEL: CPT

## 2022-04-22 PROCEDURE — 82746 ASSAY OF FOLIC ACID SERUM: CPT

## 2022-04-29 ENCOUNTER — HOSPITAL ENCOUNTER (OUTPATIENT)
Facility: HOSPITAL | Age: 57
Discharge: HOME | End: 2022-04-29
Payer: OTHER GOVERNMENT

## 2022-04-29 PROCEDURE — 93005 ELECTROCARDIOGRAM TRACING: CPT

## 2022-05-03 LAB
EKG ATRIAL RATE: 75 BPM
EKG DIAGNOSIS: NORMAL
EKG P AXIS: 42 DEGREES
EKG P-R INTERVAL: 182 MS
EKG Q-T INTERVAL: 386 MS
EKG QRS DURATION: 96 MS
EKG QTC CALCULATION (BAZETT): 431 MS
EKG R AXIS: -14 DEGREES
EKG T AXIS: 37 DEGREES
EKG VENTRICULAR RATE: 75 BPM

## 2022-08-04 ENCOUNTER — HOSPITAL ENCOUNTER (OUTPATIENT)
Facility: HOSPITAL | Age: 57
Discharge: HOME | End: 2022-08-04
Payer: OTHER GOVERNMENT

## 2022-08-04 PROCEDURE — 83001 ASSAY OF GONADOTROPIN (FSH): CPT

## 2022-08-04 PROCEDURE — 84270 ASSAY OF SEX HORMONE GLOBUL: CPT

## 2022-08-04 PROCEDURE — 84403 ASSAY OF TOTAL TESTOSTERONE: CPT

## 2022-08-04 PROCEDURE — 82670 ASSAY OF TOTAL ESTRADIOL: CPT

## 2022-08-04 PROCEDURE — 36415 COLL VENOUS BLD VENIPUNCTURE: CPT

## 2022-08-05 LAB
ESTRADIOL LEVEL: 194 PG/ML
FOLLICLE STIMULATING HORMONE: 2.9 MIU/ML

## 2022-08-09 LAB
SEX HORMONE BINDING GLOBULIN: 93 NMOL/L (ref 30–135)
TESTOSTERONE FREE-NONMALE: ABNORMAL PG/ML (ref 0.6–3.8)
TESTOSTERONE TOTAL: <3 NG/DL (ref 20–70)

## 2022-10-25 ENCOUNTER — HOSPITAL ENCOUNTER (OUTPATIENT)
Facility: HOSPITAL | Age: 57
Discharge: HOME | End: 2022-10-25

## 2022-10-25 PROCEDURE — 99001 SPECIMEN HANDLING PT-LAB: CPT

## 2022-10-26 ENCOUNTER — OFFICE VISIT (OUTPATIENT)
Dept: BARIATRICS/WEIGHT MGMT | Facility: CLINIC | Age: 57
End: 2022-10-26

## 2022-10-26 VITALS
DIASTOLIC BLOOD PRESSURE: 76 MMHG | RESPIRATION RATE: 16 BRPM | WEIGHT: 163.5 LBS | HEIGHT: 60 IN | HEART RATE: 70 BPM | TEMPERATURE: 97.8 F | OXYGEN SATURATION: 98 % | SYSTOLIC BLOOD PRESSURE: 104 MMHG | BODY MASS INDEX: 32.1 KG/M2

## 2022-10-26 DIAGNOSIS — E66.9 OBESITY, CLASS I, BMI 30-34.9: Primary | ICD-10-CM

## 2022-10-26 DIAGNOSIS — Z98.84 S/P BARIATRIC SURGERY: ICD-10-CM

## 2022-10-26 PROCEDURE — 99214 OFFICE O/P EST MOD 30 MIN: CPT | Performed by: PHYSICIAN ASSISTANT

## 2022-11-03 ENCOUNTER — OFFICE VISIT (OUTPATIENT)
Dept: OTHER | Facility: HOSPITAL | Age: 57
End: 2022-11-03

## 2022-11-03 NOTE — PROGRESS NOTES
Exercise Education Note - Initial Visit    Patient: La Gambino       Date: 11/3/2022    Patient was seen in office today for exercise education for weight loss.  Approximately 60 min was spent with patient reviewing health and exercise history, goals, and next steps.     For/Concerns: Weight loss specifies abdominal area    Current Health Status: Good    Current Exercise Abilities/Experience: Patient has not exercised in 16 years since having children.  She has spondylolysis in l4 and l5 and should not hyperextend.     Equipment / Facilities Available: DB, Bands, Treadmill at home    Barriers to Exercise: mindset, motivation, knowing what to do    Planning for Consistency / Achievement Strategies: patient as been seen previously and never started provided program, it was determine that online videos are not a suitable method.  Patient was provided handouts and we reviewed appropriate exercise in office.  Adequate time is available exercise needs to be a priority.  We identified 830-9:00 am as time to incorporate exercise M-F. It was advised to do 4-5 strength exercises followed by treadmill 4 days and 1 day 30 min of rocio or walk. Patient plans to start using the BIOCURE[employer] shira to track nutrition and water. We discussed the importance of creating habits to build upon.  Follow up scheduled for 3 weeks.         Raji Miguel  11/3/2022  12:47 EDT

## 2022-11-08 ENCOUNTER — TRANSCRIBE ORDERS (OUTPATIENT)
Dept: LAB | Facility: HOSPITAL | Age: 57
End: 2022-11-08

## 2022-11-08 ENCOUNTER — LAB (OUTPATIENT)
Dept: LAB | Facility: HOSPITAL | Age: 57
End: 2022-11-08

## 2022-11-08 DIAGNOSIS — N95.1 SYMPTOMATIC MENOPAUSAL OR FEMALE CLIMACTERIC STATES: Primary | ICD-10-CM

## 2022-11-08 DIAGNOSIS — Z79.890 NEED FOR PROPHYLACTIC HORMONE REPLACEMENT THERAPY (POSTMENOPAUSAL): ICD-10-CM

## 2022-11-08 DIAGNOSIS — N95.1 SYMPTOMATIC MENOPAUSAL OR FEMALE CLIMACTERIC STATES: ICD-10-CM

## 2022-11-08 LAB
ESTRADIOL SERPL HS-MCNC: 139 PG/ML
FSH SERPL-ACNC: 4.19 MIU/ML

## 2022-11-08 PROCEDURE — 84403 ASSAY OF TOTAL TESTOSTERONE: CPT

## 2022-11-08 PROCEDURE — 82670 ASSAY OF TOTAL ESTRADIOL: CPT

## 2022-11-08 PROCEDURE — 36415 COLL VENOUS BLD VENIPUNCTURE: CPT

## 2022-11-08 PROCEDURE — 84402 ASSAY OF FREE TESTOSTERONE: CPT

## 2022-11-08 PROCEDURE — 83001 ASSAY OF GONADOTROPIN (FSH): CPT

## 2022-11-11 LAB
TESTOST FREE SERPL-MCNC: 0.4 PG/ML (ref 0–4.2)
TESTOST SERPL-MCNC: 25 NG/DL (ref 4–50)

## 2022-11-16 ENCOUNTER — APPOINTMENT (OUTPATIENT)
Dept: OTHER | Facility: HOSPITAL | Age: 57
End: 2022-11-16

## 2022-11-22 ENCOUNTER — OFFICE VISIT (OUTPATIENT)
Dept: OTHER | Facility: HOSPITAL | Age: 57
End: 2022-11-22

## 2022-11-22 NOTE — PROGRESS NOTES
Exercise Education Note - Follow Up    Patient: La Gambino       Date: 11/22/2022  Patient was seen in office today for follow up exercise education. Approximately 30 min was spent with patient discussing progress, barriers, and next steps.     Goal(s) Achievement Status: Patient has just recently started her program and had a few questions about specific exercises. She needs more time to develop a consistent exercise program. She had a large water bottle and is making it a priority to increase hydration and get protein. She is having a shake in the morning to assist. We reviewed how to track her activity and nutrition using her apple watch and Angella Joy shira.     Barriers Identified or Additional Concerns: Patient had a difficult time getting started due to 10 days of illness right after we met and starting to substitute teach occasionally.    Next Steps: She has all this week with no expected barriers and feels she can make a effort to work on these habits.  We reviewed weight loss concepts and benefits to physical activity and exercise beyond the scale. Patient requested follow up for after the first of the year.         Raji Miguel  11/22/2022  10:17 EST

## 2023-01-03 ENCOUNTER — OFFICE VISIT (OUTPATIENT)
Dept: OTHER | Facility: HOSPITAL | Age: 58
End: 2023-01-03

## 2023-01-03 NOTE — PROGRESS NOTES
Exercise Education Note - Follow Up    Patient: La Gambino       Date: 01/03/2023  Patient was seen via telehealth/zoom appointment for follow up exercise counseling. Approximately 30 min was spent discussing patient progress, barriers, and next steps.    Goal(s) Achievement Status: patient reports consistently getting 64-80 oz of water and is using a jug to track, She has been sporatically adding in strength exercises throughout her day approximately 3 days per week and occasionally walks. She is having protein at each of her meals.     Barriers Identified or Additional Concerns: Consistency with exercise has been a struggle, seems that holiday and social schedules have made it difficult to prioritize.  We identified times of the day going forward that might be better suited for exercise and patient will set alarms to remind her.     Next Steps: Exercise when daughter is at practice or right after school/work, alternate strength and walking.  Stay consistent with water and protein.   Follow up scheduled for 6 weeks.       Raji Miguel  01/3/2023  11:33 EST

## 2023-02-02 ENCOUNTER — HOSPITAL ENCOUNTER (OUTPATIENT)
Facility: HOSPITAL | Age: 58
Discharge: HOME | End: 2023-02-02

## 2023-02-02 PROCEDURE — 36415 COLL VENOUS BLD VENIPUNCTURE: CPT

## 2023-02-02 PROCEDURE — 82670 ASSAY OF TOTAL ESTRADIOL: CPT

## 2023-02-02 PROCEDURE — 84403 ASSAY OF TOTAL TESTOSTERONE: CPT

## 2023-02-02 PROCEDURE — 83001 ASSAY OF GONADOTROPIN (FSH): CPT

## 2023-02-02 PROCEDURE — 84270 ASSAY OF SEX HORMONE GLOBUL: CPT

## 2023-02-04 LAB
ESTRADIOL LEVEL: 207 PG/ML
FOLLICLE STIMULATING HORMONE: 4.4 MIU/ML

## 2023-02-17 RX ORDER — HYDROCODONE BITARTRATE AND ACETAMINOPHEN 10; 325 MG/1; MG/1
1 TABLET ORAL DAILY
COMMUNITY

## 2023-02-17 RX ORDER — CETIRIZINE HYDROCHLORIDE 10 MG/1
10 TABLET ORAL DAILY
COMMUNITY

## 2023-02-17 RX ORDER — LORATADINE 10 MG/1
10 TABLET ORAL DAILY
COMMUNITY

## 2023-02-17 RX ORDER — ZINC GLUCONATE 50 MG
TABLET ORAL
COMMUNITY

## 2023-02-17 RX ORDER — CHLORCYCLIZINE HYDROCHLORIDE AND PSEUDOEPHEDRINE HYDROCHLORIDE 25; 60 MG/1; MG/1
1 TABLET ORAL 2 TIMES DAILY
COMMUNITY

## 2023-03-08 ENCOUNTER — TRANSCRIBE ORDERS (OUTPATIENT)
Dept: LAB | Facility: HOSPITAL | Age: 58
End: 2023-03-08
Payer: OTHER GOVERNMENT

## 2023-03-08 ENCOUNTER — LAB (OUTPATIENT)
Dept: LAB | Facility: HOSPITAL | Age: 58
End: 2023-03-08
Payer: OTHER GOVERNMENT

## 2023-03-08 DIAGNOSIS — E66.3 OVERWEIGHT: ICD-10-CM

## 2023-03-08 DIAGNOSIS — E07.9 DISORDER OF THYROID, UNSPECIFIED: ICD-10-CM

## 2023-03-08 DIAGNOSIS — Z71.3 DIETARY COUNSELING AND SURVEILLANCE: Primary | ICD-10-CM

## 2023-03-08 DIAGNOSIS — Z71.3 DIETARY COUNSELING AND SURVEILLANCE: ICD-10-CM

## 2023-03-08 LAB
25(OH)D3 SERPL-MCNC: 61.4 NG/ML (ref 30–100)
ALBUMIN SERPL-MCNC: 4.2 G/DL (ref 3.5–5.2)
ALBUMIN/GLOB SERPL: 1.6 G/DL
ALP SERPL-CCNC: 75 U/L (ref 39–117)
ALT SERPL W P-5'-P-CCNC: 28 U/L (ref 1–33)
ANION GAP SERPL CALCULATED.3IONS-SCNC: 9.9 MMOL/L (ref 5–15)
AST SERPL-CCNC: 26 U/L (ref 1–32)
BASOPHILS # BLD AUTO: 0.08 10*3/MM3 (ref 0–0.2)
BASOPHILS NFR BLD AUTO: 1.1 % (ref 0–1.5)
BILIRUB SERPL-MCNC: 0.4 MG/DL (ref 0–1.2)
BUN SERPL-MCNC: 16 MG/DL (ref 6–20)
BUN/CREAT SERPL: 25.4 (ref 7–25)
CALCIUM SPEC-SCNC: 8.9 MG/DL (ref 8.6–10.5)
CHLORIDE SERPL-SCNC: 102 MMOL/L (ref 98–107)
CHOLEST SERPL-MCNC: 149 MG/DL (ref 0–200)
CO2 SERPL-SCNC: 27.1 MMOL/L (ref 22–29)
CREAT SERPL-MCNC: 0.63 MG/DL (ref 0.57–1)
DEPRECATED RDW RBC AUTO: 40.6 FL (ref 37–54)
EGFRCR SERPLBLD CKD-EPI 2021: 104.3 ML/MIN/1.73
EOSINOPHIL # BLD AUTO: 0.32 10*3/MM3 (ref 0–0.4)
EOSINOPHIL NFR BLD AUTO: 4.3 % (ref 0.3–6.2)
ERYTHROCYTE [DISTWIDTH] IN BLOOD BY AUTOMATED COUNT: 11.8 % (ref 12.3–15.4)
FOLATE SERPL-MCNC: >20 NG/ML (ref 4.78–24.2)
GLOBULIN UR ELPH-MCNC: 2.7 GM/DL
GLUCOSE SERPL-MCNC: 83 MG/DL (ref 65–99)
HCT VFR BLD AUTO: 43.2 % (ref 34–46.6)
HDLC SERPL-MCNC: 50 MG/DL (ref 40–60)
HGB BLD-MCNC: 14.7 G/DL (ref 12–15.9)
IMM GRANULOCYTES # BLD AUTO: 0.03 10*3/MM3 (ref 0–0.05)
IMM GRANULOCYTES NFR BLD AUTO: 0.4 % (ref 0–0.5)
LDLC SERPL CALC-MCNC: 69 MG/DL (ref 0–100)
LDLC/HDLC SERPL: 1.28 {RATIO}
LYMPHOCYTES # BLD AUTO: 2.04 10*3/MM3 (ref 0.7–3.1)
LYMPHOCYTES NFR BLD AUTO: 27.1 % (ref 19.6–45.3)
MCH RBC QN AUTO: 32.1 PG (ref 26.6–33)
MCHC RBC AUTO-ENTMCNC: 34 G/DL (ref 31.5–35.7)
MCV RBC AUTO: 94.3 FL (ref 79–97)
MONOCYTES # BLD AUTO: 0.59 10*3/MM3 (ref 0.1–0.9)
MONOCYTES NFR BLD AUTO: 7.8 % (ref 5–12)
NEUTROPHILS NFR BLD AUTO: 4.46 10*3/MM3 (ref 1.7–7)
NEUTROPHILS NFR BLD AUTO: 59.3 % (ref 42.7–76)
NRBC BLD AUTO-RTO: 0.1 /100 WBC (ref 0–0.2)
PLATELET # BLD AUTO: 273 10*3/MM3 (ref 140–450)
PMV BLD AUTO: 11.2 FL (ref 6–12)
POTASSIUM SERPL-SCNC: 4.1 MMOL/L (ref 3.5–5.2)
PROT SERPL-MCNC: 6.9 G/DL (ref 6–8.5)
RBC # BLD AUTO: 4.58 10*6/MM3 (ref 3.77–5.28)
SODIUM SERPL-SCNC: 139 MMOL/L (ref 136–145)
TRIGL SERPL-MCNC: 176 MG/DL (ref 0–150)
TSH SERPL DL<=0.05 MIU/L-ACNC: 1.39 UIU/ML (ref 0.27–4.2)
VIT B12 BLD-MCNC: 1080 PG/ML (ref 211–946)
VLDLC SERPL-MCNC: 30 MG/DL (ref 5–40)
WBC NRBC COR # BLD: 7.52 10*3/MM3 (ref 3.4–10.8)

## 2023-03-08 PROCEDURE — 84443 ASSAY THYROID STIM HORMONE: CPT

## 2023-03-08 PROCEDURE — 80053 COMPREHEN METABOLIC PANEL: CPT

## 2023-03-08 PROCEDURE — 80061 LIPID PANEL: CPT

## 2023-03-08 PROCEDURE — 82306 VITAMIN D 25 HYDROXY: CPT

## 2023-03-08 PROCEDURE — 82746 ASSAY OF FOLIC ACID SERUM: CPT

## 2023-03-08 PROCEDURE — 85025 COMPLETE CBC W/AUTO DIFF WBC: CPT

## 2023-03-08 PROCEDURE — 82607 VITAMIN B-12: CPT

## 2023-03-08 PROCEDURE — 36415 COLL VENOUS BLD VENIPUNCTURE: CPT

## 2023-05-09 ENCOUNTER — TRANSCRIBE ORDERS (OUTPATIENT)
Dept: LAB | Facility: HOSPITAL | Age: 58
End: 2023-05-09
Payer: OTHER GOVERNMENT

## 2023-05-09 ENCOUNTER — LAB (OUTPATIENT)
Dept: LAB | Facility: HOSPITAL | Age: 58
End: 2023-05-09
Payer: OTHER GOVERNMENT

## 2023-05-09 DIAGNOSIS — N95.1 SYMPTOMATIC MENOPAUSAL OR FEMALE CLIMACTERIC STATES: ICD-10-CM

## 2023-05-09 DIAGNOSIS — Z79.890 NEED FOR PROPHYLACTIC HORMONE REPLACEMENT THERAPY (POSTMENOPAUSAL): ICD-10-CM

## 2023-05-09 DIAGNOSIS — Z79.890 NEED FOR PROPHYLACTIC HORMONE REPLACEMENT THERAPY (POSTMENOPAUSAL): Primary | ICD-10-CM

## 2023-05-09 PROCEDURE — 84403 ASSAY OF TOTAL TESTOSTERONE: CPT

## 2023-05-09 PROCEDURE — 83001 ASSAY OF GONADOTROPIN (FSH): CPT

## 2023-05-09 PROCEDURE — 84402 ASSAY OF FREE TESTOSTERONE: CPT

## 2023-05-09 PROCEDURE — 36415 COLL VENOUS BLD VENIPUNCTURE: CPT

## 2023-05-09 PROCEDURE — 82670 ASSAY OF TOTAL ESTRADIOL: CPT

## 2023-05-10 LAB
ESTRADIOL SERPL HS-MCNC: 142 PG/ML
FSH SERPL-ACNC: 3.79 MIU/ML

## 2023-05-13 LAB
TESTOST FREE SERPL-MCNC: 0.3 PG/ML (ref 0–4.2)
TESTOST SERPL-MCNC: 27 NG/DL (ref 4–50)

## 2023-06-02 ENCOUNTER — HOSPITAL ENCOUNTER (OUTPATIENT)
Facility: HOSPITAL | Age: 58
Discharge: HOME | End: 2023-06-02
Payer: OTHER GOVERNMENT

## 2023-06-02 LAB
25(OH)D3 SERPL-MCNC: 70.9 NG/ML (ref 32–100)
ALBUMIN SERPL-MCNC: 4.4 G/DL (ref 3.4–4.8)
ALBUMIN/GLOB SERPL: 1.6 {RATIO} (ref 0.8–2)
ALP SERPL-CCNC: 90 U/L (ref 25–100)
ALT SERPL-CCNC: 21 U/L (ref 4–36)
ANION GAP SERPL CALCULATED.3IONS-SCNC: 11 MMOL/L (ref 3–16)
AST SERPL-CCNC: 22 U/L (ref 8–33)
BASOPHILS # BLD: 0 K/UL (ref 0–0.1)
BASOPHILS NFR BLD: 0.5 %
BILIRUB SERPL-MCNC: 0.5 MG/DL (ref 0.3–1.2)
BUN SERPL-MCNC: 15 MG/DL (ref 6–20)
CALCIUM SERPL-MCNC: 9.7 MG/DL (ref 8.5–10.5)
CHLORIDE SERPL-SCNC: 103 MMOL/L (ref 98–107)
CHOLEST SERPL-MCNC: 212 MG/DL (ref 0–200)
CO2 SERPL-SCNC: 25 MMOL/L (ref 20–30)
CREAT SERPL-MCNC: 0.6 MG/DL (ref 0.4–1.2)
EOSINOPHIL # BLD: 0.1 K/UL (ref 0–0.4)
EOSINOPHIL NFR BLD: 1.5 %
ERYTHROCYTE [DISTWIDTH] IN BLOOD BY AUTOMATED COUNT: 12.9 % (ref 11–16)
FOLATE SERPL-MCNC: 16.6 NG/ML
GFR SERPLBLD CREATININE-BSD FMLA CKD-EPI: >60 ML/MIN/{1.73_M2}
GLOBULIN SER CALC-MCNC: 2.7 G/DL
GLUCOSE SERPL-MCNC: 88 MG/DL (ref 74–106)
HCT VFR BLD AUTO: 43.3 % (ref 37–47)
HDLC SERPL-MCNC: 51 MG/DL (ref 40–60)
HGB BLD-MCNC: 14.4 G/DL (ref 11.5–16.5)
IMM GRANULOCYTES # BLD: 0 K/UL
IMM GRANULOCYTES NFR BLD: 0.2 % (ref 0–5)
LDLC SERPL CALC-MCNC: 126 MG/DL
LYMPHOCYTES # BLD: 1.7 K/UL (ref 1.5–4)
LYMPHOCYTES NFR BLD: 28.6 %
MCH RBC QN AUTO: 30.9 PG (ref 27–32)
MCHC RBC AUTO-ENTMCNC: 33.3 G/DL (ref 31–35)
MCV RBC AUTO: 92.9 FL (ref 80–100)
MONOCYTES # BLD: 0.5 K/UL (ref 0.2–0.8)
MONOCYTES NFR BLD: 8 %
NEUTROPHILS # BLD: 3.6 K/UL (ref 2–7.5)
NEUTS SEG NFR BLD: 61.2 %
PLATELET # BLD AUTO: 301 K/UL (ref 150–400)
PMV BLD AUTO: 11 FL (ref 6–10)
POTASSIUM SERPL-SCNC: 4.7 MMOL/L (ref 3.4–5.1)
PROT SERPL-MCNC: 7.1 G/DL (ref 6.4–8.3)
RBC # BLD AUTO: 4.66 M/UL (ref 3.8–5.8)
SODIUM SERPL-SCNC: 139 MMOL/L (ref 136–145)
TRIGL SERPL-MCNC: 173 MG/DL (ref 0–249)
TSH SERPL DL<=0.005 MIU/L-ACNC: 1.06 UIU/ML (ref 0.27–4.2)
VIT B12 SERPL-MCNC: 933 PG/ML (ref 211–911)
VLDLC SERPL CALC-MCNC: 35 MG/DL
WBC # BLD AUTO: 5.9 K/UL (ref 4–11)

## 2023-06-02 PROCEDURE — 80053 COMPREHEN METABOLIC PANEL: CPT

## 2023-06-02 PROCEDURE — 85025 COMPLETE CBC W/AUTO DIFF WBC: CPT

## 2023-06-02 PROCEDURE — 36415 COLL VENOUS BLD VENIPUNCTURE: CPT

## 2023-06-02 PROCEDURE — 82746 ASSAY OF FOLIC ACID SERUM: CPT

## 2023-06-02 PROCEDURE — 84443 ASSAY THYROID STIM HORMONE: CPT

## 2023-06-02 PROCEDURE — 82607 VITAMIN B-12: CPT

## 2023-06-02 PROCEDURE — 82306 VITAMIN D 25 HYDROXY: CPT

## 2023-06-02 PROCEDURE — 80061 LIPID PANEL: CPT

## 2023-06-20 ENCOUNTER — HOSPITAL ENCOUNTER (OUTPATIENT)
Dept: MAMMOGRAPHY | Facility: HOSPITAL | Age: 58
Discharge: HOME | End: 2023-06-20
Payer: OTHER GOVERNMENT

## 2023-06-20 VITALS — WEIGHT: 159 LBS | HEIGHT: 61 IN | BODY MASS INDEX: 30.02 KG/M2

## 2023-06-20 DIAGNOSIS — Z12.31 BREAST CANCER SCREENING BY MAMMOGRAM: ICD-10-CM

## 2023-06-20 PROCEDURE — 77063 BREAST TOMOSYNTHESIS BI: CPT

## 2023-08-07 ENCOUNTER — TRANSCRIBE ORDERS (OUTPATIENT)
Dept: LAB | Facility: HOSPITAL | Age: 58
End: 2023-08-07
Payer: OTHER GOVERNMENT

## 2023-08-07 ENCOUNTER — LAB (OUTPATIENT)
Dept: LAB | Facility: HOSPITAL | Age: 58
End: 2023-08-07
Payer: OTHER GOVERNMENT

## 2023-08-07 DIAGNOSIS — Z79.890 NEED FOR PROPHYLACTIC HORMONE REPLACEMENT THERAPY (POSTMENOPAUSAL): ICD-10-CM

## 2023-08-07 DIAGNOSIS — N95.1 SYMPTOMATIC MENOPAUSAL OR FEMALE CLIMACTERIC STATES: Primary | ICD-10-CM

## 2023-08-07 DIAGNOSIS — N95.1 SYMPTOMATIC MENOPAUSAL OR FEMALE CLIMACTERIC STATES: ICD-10-CM

## 2023-08-07 PROCEDURE — 82670 ASSAY OF TOTAL ESTRADIOL: CPT

## 2023-08-07 PROCEDURE — 84402 ASSAY OF FREE TESTOSTERONE: CPT

## 2023-08-07 PROCEDURE — 84403 ASSAY OF TOTAL TESTOSTERONE: CPT

## 2023-08-07 PROCEDURE — 36415 COLL VENOUS BLD VENIPUNCTURE: CPT

## 2023-08-07 PROCEDURE — 83001 ASSAY OF GONADOTROPIN (FSH): CPT

## 2023-08-08 LAB
ESTRADIOL SERPL HS-MCNC: 156 PG/ML
FSH SERPL-ACNC: 3.93 MIU/ML

## 2023-08-15 LAB
TESTOST FREE SERPL-MCNC: 0.5 PG/ML (ref 0–4.2)
TESTOST SERPL-MCNC: 21 NG/DL (ref 4–50)

## 2023-11-07 ENCOUNTER — TRANSCRIBE ORDERS (OUTPATIENT)
Dept: LAB | Facility: HOSPITAL | Age: 58
End: 2023-11-07
Payer: OTHER GOVERNMENT

## 2023-11-07 ENCOUNTER — LAB (OUTPATIENT)
Dept: LAB | Facility: HOSPITAL | Age: 58
End: 2023-11-07
Payer: OTHER GOVERNMENT

## 2023-11-07 DIAGNOSIS — N95.1 MENOPAUSAL STATE: Primary | ICD-10-CM

## 2023-11-07 DIAGNOSIS — Z79.890 NEED FOR PROPHYLACTIC HORMONE REPLACEMENT THERAPY (POSTMENOPAUSAL): ICD-10-CM

## 2023-11-07 DIAGNOSIS — N95.1 MENOPAUSAL STATE: ICD-10-CM

## 2023-11-07 LAB
ESTRADIOL SERPL HS-MCNC: 156 PG/ML
FSH SERPL-ACNC: 4.47 MIU/ML

## 2023-11-07 PROCEDURE — 84403 ASSAY OF TOTAL TESTOSTERONE: CPT

## 2023-11-07 PROCEDURE — 82670 ASSAY OF TOTAL ESTRADIOL: CPT

## 2023-11-07 PROCEDURE — 36415 COLL VENOUS BLD VENIPUNCTURE: CPT

## 2023-11-07 PROCEDURE — 84402 ASSAY OF FREE TESTOSTERONE: CPT

## 2023-11-07 PROCEDURE — 83001 ASSAY OF GONADOTROPIN (FSH): CPT

## 2023-11-10 LAB
TESTOST FREE SERPL-MCNC: 0.3 PG/ML (ref 0–4.2)
TESTOST SERPL-MCNC: 35 NG/DL (ref 4–50)

## 2024-02-07 ENCOUNTER — TRANSCRIBE ORDERS (OUTPATIENT)
Dept: LAB | Facility: HOSPITAL | Age: 59
End: 2024-02-07
Payer: OTHER GOVERNMENT

## 2024-02-07 ENCOUNTER — LAB (OUTPATIENT)
Dept: LAB | Facility: HOSPITAL | Age: 59
End: 2024-02-07
Payer: OTHER GOVERNMENT

## 2024-02-07 DIAGNOSIS — Z79.890 NEED FOR PROPHYLACTIC HORMONE REPLACEMENT THERAPY (POSTMENOPAUSAL): ICD-10-CM

## 2024-02-07 DIAGNOSIS — N95.1 FEMALE CLIMACTERIC STATE: ICD-10-CM

## 2024-02-07 DIAGNOSIS — N95.1 FEMALE CLIMACTERIC STATE: Primary | ICD-10-CM

## 2024-02-07 LAB
ESTRADIOL SERPL HS-MCNC: 200 PG/ML
FSH SERPL-ACNC: 3.47 MIU/ML

## 2024-02-07 PROCEDURE — 84402 ASSAY OF FREE TESTOSTERONE: CPT

## 2024-02-07 PROCEDURE — 84403 ASSAY OF TOTAL TESTOSTERONE: CPT

## 2024-02-07 PROCEDURE — 83001 ASSAY OF GONADOTROPIN (FSH): CPT

## 2024-02-07 PROCEDURE — 82670 ASSAY OF TOTAL ESTRADIOL: CPT

## 2024-02-07 PROCEDURE — 36415 COLL VENOUS BLD VENIPUNCTURE: CPT

## 2024-02-19 LAB
TESTOST FREE SERPL-MCNC: 0.2 PG/ML (ref 0–4.2)
TESTOST SERPL-MCNC: 38 NG/DL (ref 4–50)

## 2024-05-06 ENCOUNTER — LAB (OUTPATIENT)
Dept: LAB | Facility: HOSPITAL | Age: 59
End: 2024-05-06
Payer: OTHER GOVERNMENT

## 2024-05-06 ENCOUNTER — TRANSCRIBE ORDERS (OUTPATIENT)
Dept: LAB | Facility: HOSPITAL | Age: 59
End: 2024-05-06
Payer: OTHER GOVERNMENT

## 2024-05-06 DIAGNOSIS — N95.1 SYMPTOMATIC MENOPAUSAL OR FEMALE CLIMACTERIC STATES: ICD-10-CM

## 2024-05-06 DIAGNOSIS — Z79.890 POSTMENOPAUSAL HORMONE THERAPY: ICD-10-CM

## 2024-05-06 DIAGNOSIS — N95.1 SYMPTOMATIC MENOPAUSAL OR FEMALE CLIMACTERIC STATES: Primary | ICD-10-CM

## 2024-05-06 PROCEDURE — 84403 ASSAY OF TOTAL TESTOSTERONE: CPT

## 2024-05-06 PROCEDURE — 82670 ASSAY OF TOTAL ESTRADIOL: CPT

## 2024-05-06 PROCEDURE — 84402 ASSAY OF FREE TESTOSTERONE: CPT

## 2024-05-06 PROCEDURE — 83001 ASSAY OF GONADOTROPIN (FSH): CPT

## 2024-05-06 PROCEDURE — 36415 COLL VENOUS BLD VENIPUNCTURE: CPT

## 2024-05-07 LAB
ESTRADIOL SERPL HS-MCNC: 294 PG/ML
FSH SERPL-ACNC: 2.88 MIU/ML

## 2024-05-14 LAB
TESTOST FREE SERPL-MCNC: 0.4 PG/ML (ref 0–4.2)
TESTOST SERPL-MCNC: 48 NG/DL (ref 4–50)

## 2024-06-21 ENCOUNTER — HOSPITAL ENCOUNTER (OUTPATIENT)
Dept: MAMMOGRAPHY | Facility: HOSPITAL | Age: 59
Discharge: HOME | End: 2024-06-21
Payer: OTHER GOVERNMENT

## 2024-06-21 VITALS — WEIGHT: 160 LBS | BODY MASS INDEX: 30.23 KG/M2

## 2024-06-21 DIAGNOSIS — Z12.31 VISIT FOR SCREENING MAMMOGRAM: ICD-10-CM

## 2024-06-21 PROCEDURE — 77063 BREAST TOMOSYNTHESIS BI: CPT

## 2024-08-06 ENCOUNTER — LAB (OUTPATIENT)
Dept: LAB | Facility: HOSPITAL | Age: 59
End: 2024-08-06
Payer: OTHER GOVERNMENT

## 2024-08-06 ENCOUNTER — TRANSCRIBE ORDERS (OUTPATIENT)
Dept: LAB | Facility: HOSPITAL | Age: 59
End: 2024-08-06
Payer: OTHER GOVERNMENT

## 2024-08-06 DIAGNOSIS — N95.1 MENOPAUSAL STATE: ICD-10-CM

## 2024-08-06 DIAGNOSIS — Z79.890 NEED FOR PROPHYLACTIC HORMONE REPLACEMENT THERAPY (POSTMENOPAUSAL): ICD-10-CM

## 2024-08-06 DIAGNOSIS — Z79.890 NEED FOR PROPHYLACTIC HORMONE REPLACEMENT THERAPY (POSTMENOPAUSAL): Primary | ICD-10-CM

## 2024-08-06 PROCEDURE — 36415 COLL VENOUS BLD VENIPUNCTURE: CPT

## 2024-08-06 PROCEDURE — 82670 ASSAY OF TOTAL ESTRADIOL: CPT

## 2024-08-06 PROCEDURE — 84402 ASSAY OF FREE TESTOSTERONE: CPT

## 2024-08-06 PROCEDURE — 83001 ASSAY OF GONADOTROPIN (FSH): CPT

## 2024-08-06 PROCEDURE — 84403 ASSAY OF TOTAL TESTOSTERONE: CPT

## 2024-08-07 LAB
ESTRADIOL SERPL HS-MCNC: 187 PG/ML
FSH SERPL-ACNC: 2.47 MIU/ML

## 2024-08-09 LAB
TESTOST FREE SERPL-MCNC: <0.2 PG/ML (ref 0–4.2)
TESTOST SERPL-MCNC: 28 NG/DL (ref 4–50)

## 2024-11-04 ENCOUNTER — LAB (OUTPATIENT)
Dept: LAB | Facility: HOSPITAL | Age: 59
End: 2024-11-04
Payer: OTHER GOVERNMENT

## 2024-11-04 ENCOUNTER — TRANSCRIBE ORDERS (OUTPATIENT)
Dept: LAB | Facility: HOSPITAL | Age: 59
End: 2024-11-04
Payer: OTHER GOVERNMENT

## 2024-11-04 DIAGNOSIS — N95.1 SYMPTOMATIC MENOPAUSAL OR FEMALE CLIMACTERIC STATES: Primary | ICD-10-CM

## 2024-11-04 DIAGNOSIS — Z79.890 POSTMENOPAUSAL HRT (HORMONE REPLACEMENT THERAPY): ICD-10-CM

## 2024-11-04 DIAGNOSIS — N95.1 SYMPTOMATIC MENOPAUSAL OR FEMALE CLIMACTERIC STATES: ICD-10-CM

## 2024-11-04 PROCEDURE — 36415 COLL VENOUS BLD VENIPUNCTURE: CPT

## 2024-11-04 PROCEDURE — 84403 ASSAY OF TOTAL TESTOSTERONE: CPT

## 2024-11-04 PROCEDURE — 82670 ASSAY OF TOTAL ESTRADIOL: CPT

## 2024-11-04 PROCEDURE — 83001 ASSAY OF GONADOTROPIN (FSH): CPT

## 2024-11-04 PROCEDURE — 84402 ASSAY OF FREE TESTOSTERONE: CPT

## 2024-11-05 LAB
ESTRADIOL SERPL HS-MCNC: 193 PG/ML
FSH SERPL-ACNC: 2.84 MIU/ML

## 2024-11-11 LAB
TESTOST FREE SERPL-MCNC: 0.2 PG/ML (ref 0–4.2)
TESTOST SERPL-MCNC: 68 NG/DL (ref 4–50)

## 2024-12-02 ENCOUNTER — HOSPITAL ENCOUNTER (OUTPATIENT)
Facility: HOSPITAL | Age: 59
Discharge: HOME | End: 2024-12-02

## 2024-12-02 LAB
25(OH)D3 SERPL-MCNC: 34.1 NG/ML (ref 32–100)
ALBUMIN SERPL-MCNC: 4.3 G/DL (ref 3.4–4.8)
ALBUMIN/GLOB SERPL: 1.7 {RATIO} (ref 0.8–2)
ALP SERPL-CCNC: 91 U/L (ref 25–100)
ALT SERPL-CCNC: 19 U/L (ref 4–36)
ANION GAP SERPL CALCULATED.3IONS-SCNC: 10 MMOL/L (ref 3–16)
AST SERPL-CCNC: 23 U/L (ref 8–33)
BASOPHILS # BLD: 0.1 K/UL (ref 0–0.1)
BASOPHILS NFR BLD: 1.1 %
BILIRUB SERPL-MCNC: <0.2 MG/DL (ref 0.3–1.2)
BUN SERPL-MCNC: 15 MG/DL (ref 6–20)
CALCIUM SERPL-MCNC: 9.2 MG/DL (ref 8.5–10.5)
CHLORIDE SERPL-SCNC: 104 MMOL/L (ref 98–107)
CHOLEST SERPL-MCNC: 213 MG/DL (ref 0–200)
CO2 SERPL-SCNC: 25 MMOL/L (ref 20–30)
CREAT SERPL-MCNC: 0.6 MG/DL (ref 0.4–1.2)
EOSINOPHIL # BLD: 0.2 K/UL (ref 0–0.4)
EOSINOPHIL NFR BLD: 3.5 %
ERYTHROCYTE [DISTWIDTH] IN BLOOD BY AUTOMATED COUNT: 12.3 % (ref 11–16)
FERRITIN SERPL IA-MCNC: 128 NG/ML (ref 22–322)
FOLATE SERPL-MCNC: >20 NG/ML
GFR SERPLBLD CREATININE-BSD FMLA CKD-EPI: >90 ML/MIN/{1.73_M2}
GLOBULIN SER CALC-MCNC: 2.5 G/DL
GLUCOSE SERPL-MCNC: 87 MG/DL (ref 74–106)
HBA1C MFR BLD: 5.4 %
HCT VFR BLD AUTO: 42.9 % (ref 37–47)
HDLC SERPL-MCNC: 52 MG/DL (ref 40–60)
HGB BLD-MCNC: 14.4 G/DL (ref 11.5–16.5)
IMM GRANULOCYTES # BLD: 0 K/UL
IMM GRANULOCYTES NFR BLD: 0.3 % (ref 0–5)
IRON SERPL-MCNC: 101 UG/DL (ref 37–145)
LDLC SERPL CALC-MCNC: 132 MG/DL
LYMPHOCYTES # BLD: 1.4 K/UL (ref 1.5–4)
LYMPHOCYTES NFR BLD: 21.9 %
MCH RBC QN AUTO: 31.2 PG (ref 27–32)
MCHC RBC AUTO-ENTMCNC: 33.6 G/DL (ref 31–35)
MCV RBC AUTO: 92.9 FL (ref 80–100)
MONOCYTES # BLD: 0.4 K/UL (ref 0.2–0.8)
MONOCYTES NFR BLD: 7 %
NEUTROPHILS # BLD: 4.2 K/UL (ref 2–7.5)
NEUTS SEG NFR BLD: 66.2 %
PLATELET # BLD AUTO: 242 K/UL (ref 150–400)
PMV BLD AUTO: 11.1 FL (ref 6–10)
POTASSIUM SERPL-SCNC: 4.5 MMOL/L (ref 3.4–5.1)
PROT SERPL-MCNC: 6.8 G/DL (ref 6.4–8.3)
RBC # BLD AUTO: 4.62 M/UL (ref 3.8–5.8)
SODIUM SERPL-SCNC: 139 MMOL/L (ref 136–145)
TIBC SERPL-MCNC: 307 UG/DL (ref 250–450)
TRIGL SERPL-MCNC: 144 MG/DL (ref 0–249)
TSH SERPL DL<=0.005 MIU/L-ACNC: 1.89 UIU/ML (ref 0.27–4.2)
VIT B12 SERPL-MCNC: 760 PG/ML (ref 211–911)
VLDLC SERPL CALC-MCNC: 29 MG/DL
WBC # BLD AUTO: 6.3 K/UL (ref 4–11)

## 2024-12-02 PROCEDURE — 84443 ASSAY THYROID STIM HORMONE: CPT

## 2024-12-02 PROCEDURE — 82306 VITAMIN D 25 HYDROXY: CPT

## 2024-12-02 PROCEDURE — 82728 ASSAY OF FERRITIN: CPT

## 2024-12-02 PROCEDURE — 82607 VITAMIN B-12: CPT

## 2024-12-02 PROCEDURE — 82746 ASSAY OF FOLIC ACID SERUM: CPT

## 2024-12-02 PROCEDURE — 83550 IRON BINDING TEST: CPT

## 2024-12-02 PROCEDURE — 83540 ASSAY OF IRON: CPT

## 2024-12-02 PROCEDURE — 80053 COMPREHEN METABOLIC PANEL: CPT

## 2024-12-02 PROCEDURE — 83036 HEMOGLOBIN GLYCOSYLATED A1C: CPT

## 2024-12-02 PROCEDURE — 80061 LIPID PANEL: CPT

## 2024-12-02 PROCEDURE — 85025 COMPLETE CBC W/AUTO DIFF WBC: CPT

## 2024-12-02 PROCEDURE — 36415 COLL VENOUS BLD VENIPUNCTURE: CPT

## 2025-02-14 ENCOUNTER — HOSPITAL ENCOUNTER (OUTPATIENT)
Facility: HOSPITAL | Age: 60
Discharge: HOME | End: 2025-02-14
Payer: OTHER GOVERNMENT

## 2025-02-14 PROCEDURE — 87088 URINE BACTERIA CULTURE: CPT

## 2025-02-14 PROCEDURE — 87186 SC STD MICRODIL/AGAR DIL: CPT

## 2025-02-14 PROCEDURE — 87086 URINE CULTURE/COLONY COUNT: CPT

## 2025-02-16 LAB
BACTERIA UR CULT: ABNORMAL
ORGANISM: ABNORMAL

## 2025-02-17 ENCOUNTER — TRANSCRIBE ORDERS (OUTPATIENT)
Dept: LAB | Facility: HOSPITAL | Age: 60
End: 2025-02-17
Payer: OTHER GOVERNMENT

## 2025-02-17 ENCOUNTER — LAB (OUTPATIENT)
Dept: LAB | Facility: HOSPITAL | Age: 60
End: 2025-02-17
Payer: OTHER GOVERNMENT

## 2025-02-17 DIAGNOSIS — N95.1 MENOPAUSAL SYMPTOMS: ICD-10-CM

## 2025-02-17 DIAGNOSIS — N95.1 MENOPAUSAL SYMPTOMS: Primary | ICD-10-CM

## 2025-02-17 LAB
BACTERIA UR CULT: ABNORMAL
BACTERIA UR CULT: ABNORMAL
ESTRADIOL SERPL HS-MCNC: 119 PG/ML
FSH SERPL-ACNC: 3.53 MIU/ML
ORGANISM: ABNORMAL

## 2025-02-17 PROCEDURE — 84402 ASSAY OF FREE TESTOSTERONE: CPT

## 2025-02-17 PROCEDURE — 36415 COLL VENOUS BLD VENIPUNCTURE: CPT

## 2025-02-17 PROCEDURE — 82670 ASSAY OF TOTAL ESTRADIOL: CPT

## 2025-02-17 PROCEDURE — 83001 ASSAY OF GONADOTROPIN (FSH): CPT

## 2025-02-17 PROCEDURE — 84403 ASSAY OF TOTAL TESTOSTERONE: CPT

## 2025-02-21 LAB
TESTOST FREE SERPL-MCNC: NORMAL PG/ML
TESTOST SERPL-MCNC: 17 NG/DL (ref 4–50)

## 2025-04-15 ENCOUNTER — HOSPITAL ENCOUNTER (OUTPATIENT)
Dept: SLEEP CENTER | Facility: HOSPITAL | Age: 60
Discharge: HOME | End: 2025-04-17
Payer: OTHER GOVERNMENT

## 2025-04-15 PROCEDURE — 95806 SLEEP STUDY UNATT&RESP EFFT: CPT

## 2025-04-16 NOTE — PROCEDURES
Media Information        Interpretation:     Mild obstructive sleep apnea.   Obesity.   Subjective hypersomnia.     Recommendations:    Consider Auto CPAP to titrate between 8cm water pressure and 20cm water pressure.   Weight loss and exercise.   Avoid risky activity such as driving if sleepy.   Discuss sleep hygiene with the patient.   Monitor PAP compliance and effectiveness.       Glenn Marinelli MD, FCCP, [facility]

## 2025-05-19 ENCOUNTER — LAB (OUTPATIENT)
Dept: LAB | Facility: HOSPITAL | Age: 60
End: 2025-05-19
Payer: OTHER GOVERNMENT

## 2025-05-19 ENCOUNTER — TRANSCRIBE ORDERS (OUTPATIENT)
Dept: LAB | Facility: HOSPITAL | Age: 60
End: 2025-05-19
Payer: OTHER GOVERNMENT

## 2025-05-19 DIAGNOSIS — N95.1 MENOPAUSAL SYMPTOMS: Primary | ICD-10-CM

## 2025-05-19 DIAGNOSIS — N95.1 MENOPAUSAL SYMPTOMS: ICD-10-CM

## 2025-05-19 PROCEDURE — 36415 COLL VENOUS BLD VENIPUNCTURE: CPT

## 2025-05-19 PROCEDURE — 83001 ASSAY OF GONADOTROPIN (FSH): CPT

## 2025-05-19 PROCEDURE — 84403 ASSAY OF TOTAL TESTOSTERONE: CPT

## 2025-05-19 PROCEDURE — 84402 ASSAY OF FREE TESTOSTERONE: CPT

## 2025-05-19 PROCEDURE — 82670 ASSAY OF TOTAL ESTRADIOL: CPT

## 2025-05-20 LAB
ESTRADIOL SERPL HS-MCNC: 244 PG/ML
FSH SERPL-ACNC: 3.79 MIU/ML

## 2025-05-22 LAB
TESTOST FREE SERPL-MCNC: 0.7 PG/ML (ref 0–4.2)
TESTOST SERPL-MCNC: 90 NG/DL (ref 4–50)

## 2025-07-07 ENCOUNTER — HOSPITAL ENCOUNTER (OUTPATIENT)
Dept: MAMMOGRAPHY | Facility: HOSPITAL | Age: 60
Discharge: HOME | End: 2025-07-07
Payer: OTHER GOVERNMENT

## 2025-07-07 VITALS — BODY MASS INDEX: 30.23 KG/M2 | HEIGHT: 61 IN

## 2025-07-07 DIAGNOSIS — Z12.31 OTHER SCREENING MAMMOGRAM: ICD-10-CM

## 2025-07-07 PROCEDURE — 77063 BREAST TOMOSYNTHESIS BI: CPT

## 2025-07-08 ENCOUNTER — HOSPITAL ENCOUNTER (OUTPATIENT)
Dept: MAMMOGRAPHY | Facility: HOSPITAL | Age: 60
Discharge: HOME | End: 2025-07-08
Payer: OTHER GOVERNMENT

## 2025-07-08 DIAGNOSIS — R92.8 ABNORMAL MAMMOGRAM: ICD-10-CM

## 2025-07-08 PROCEDURE — G0279 TOMOSYNTHESIS, MAMMO: HCPCS

## 2025-08-20 ENCOUNTER — LAB (OUTPATIENT)
Dept: LAB | Facility: HOSPITAL | Age: 60
End: 2025-08-20
Payer: OTHER GOVERNMENT

## 2025-08-20 ENCOUNTER — TRANSCRIBE ORDERS (OUTPATIENT)
Dept: LAB | Facility: HOSPITAL | Age: 60
End: 2025-08-20
Payer: OTHER GOVERNMENT

## 2025-08-20 DIAGNOSIS — N95.1 SYMPTOMATIC MENOPAUSAL OR FEMALE CLIMACTERIC STATES: Primary | ICD-10-CM

## 2025-08-20 DIAGNOSIS — Z79.890 NEED FOR PROPHYLACTIC HORMONE REPLACEMENT THERAPY (POSTMENOPAUSAL): ICD-10-CM

## 2025-08-20 DIAGNOSIS — N95.1 SYMPTOMATIC MENOPAUSAL OR FEMALE CLIMACTERIC STATES: ICD-10-CM

## 2025-08-20 LAB
ESTRADIOL SERPL HS-MCNC: 169 PG/ML
FSH SERPL-ACNC: 3.28 MIU/ML

## 2025-08-20 PROCEDURE — 83001 ASSAY OF GONADOTROPIN (FSH): CPT

## 2025-08-20 PROCEDURE — 36415 COLL VENOUS BLD VENIPUNCTURE: CPT

## 2025-08-20 PROCEDURE — 82670 ASSAY OF TOTAL ESTRADIOL: CPT

## 2025-08-20 PROCEDURE — 84403 ASSAY OF TOTAL TESTOSTERONE: CPT

## 2025-08-20 PROCEDURE — 84402 ASSAY OF FREE TESTOSTERONE: CPT

## 2025-08-27 LAB
TESTOST FREE SERPL-MCNC: 0.3 PG/ML (ref 0–4.2)
TESTOST SERPL-MCNC: 19 NG/DL (ref 4–50)

## (undated) DEVICE — ENDOPATH XCEL BLADELESS TROCARS WITH STABILITY SLEEVES: Brand: ENDOPATH XCEL

## (undated) DEVICE — CLMP STD 22CM DISP

## (undated) DEVICE — MEDI-VAC NON-CONDUCTIVE SUCTION TUBING: Brand: CARDINAL HEALTH

## (undated) DEVICE — GLV SURG SENSICARE W/ALOE PF LF SZ6 STRL

## (undated) DEVICE — Device

## (undated) DEVICE — APL DUPLOSPRAYER MIS 40CM

## (undated) DEVICE — SHT AIR TRANSFR COMFRT GLIDE LAT 40X80IN

## (undated) DEVICE — MARYLAND JAW LAPAROSCOPIC SEALER/DIVIDER COATED: Brand: LIGASURE

## (undated) DEVICE — POWER SHELL: Brand: SIGNIA

## (undated) DEVICE — CONTN GRAD MEAS TRIANG 32OZ BLK

## (undated) DEVICE — GLV SURG SENSICARE PI MIC PF SZ8.5 LF STRL

## (undated) DEVICE — YANKAUER,BULB TIP, NO VENT: Brand: ARGYLE

## (undated) DEVICE — INTENDED USE FOR SURGICAL MARKING ON INTACT SKIN, ALSO PROVIDES A PERMANENT METHOD OF IDENTIFYING OBJECTS IN THE OPERATING ROOM: Brand: WRITESITE® REGULAR TIP SKIN MARKER

## (undated) DEVICE — ENDOPATH 5MM ENDOSCOPIC BLUNT TIP DISSECTORS (12 POUCHES CONTAINING 3 DISSECTORS EACH): Brand: ENDOPATH

## (undated) DEVICE — GOWN,NON-REINFORCED,SIRUS,SET IN SLV,XXL: Brand: MEDLINE

## (undated) DEVICE — PK BARIATRIC 10

## (undated) DEVICE — UNDRPD COMFRT GLD DRYPAD 36X57IN

## (undated) DEVICE — FLTR PLUMEPORT LAP W/CONN STRL

## (undated) DEVICE — Device: Brand: DEFENDO AIR/WATER/SUCTION AND BIOPSY VALVE

## (undated) DEVICE — CVR HNDL LIGHT RIGID

## (undated) DEVICE — TISSUE RETRIEVAL SYSTEM: Brand: INZII RETRIEVAL SYSTEM

## (undated) DEVICE — JELLY,LUBE,STERILE,FLIP TOP,TUBE,2-OZ: Brand: MEDLINE

## (undated) DEVICE — TROCAR: Brand: KII FIOS FIRST ENTRY

## (undated) DEVICE — [HIGH FLOW INSUFFLATOR,  DO NOT USE IF PACKAGE IS DAMAGED,  KEEP DRY,  KEEP AWAY FROM SUNLIGHT,  PROTECT FROM HEAT AND RADIOACTIVE SOURCES.]: Brand: PNEUMOSURE

## (undated) DEVICE — GLV SURG SENSICARE W/ALOE PF LF 6.5 STRL

## (undated) DEVICE — ENDOPATH XCEL UNIVERSAL TROCAR STABLILITY SLEEVES: Brand: ENDOPATH XCEL

## (undated) DEVICE — APPL CHLORAPREP TINTED 26ML TEAL